# Patient Record
Sex: MALE | Race: WHITE | NOT HISPANIC OR LATINO | Employment: FULL TIME | URBAN - NONMETROPOLITAN AREA
[De-identification: names, ages, dates, MRNs, and addresses within clinical notes are randomized per-mention and may not be internally consistent; named-entity substitution may affect disease eponyms.]

---

## 2023-07-26 LAB
ALANINE AMINOTRANSFERASE (SGPT) (U/L) IN SER/PLAS: 42 U/L (ref 10–52)
ALBUMIN (G/DL) IN SER/PLAS: 4.3 G/DL (ref 3.4–5)
ALKALINE PHOSPHATASE (U/L) IN SER/PLAS: 58 U/L (ref 33–136)
ANION GAP IN SER/PLAS: 11 MMOL/L (ref 10–20)
ASPARTATE AMINOTRANSFERASE (SGOT) (U/L) IN SER/PLAS: 31 U/L (ref 9–39)
BILIRUBIN TOTAL (MG/DL) IN SER/PLAS: 1.4 MG/DL (ref 0–1.2)
CALCIUM (MG/DL) IN SER/PLAS: 9.9 MG/DL (ref 8.6–10.3)
CARBON DIOXIDE, TOTAL (MMOL/L) IN SER/PLAS: 28 MMOL/L (ref 21–32)
CHLORIDE (MMOL/L) IN SER/PLAS: 104 MMOL/L (ref 98–107)
CHOLESTEROL (MG/DL) IN SER/PLAS: 122 MG/DL (ref 0–199)
CHOLESTEROL IN HDL (MG/DL) IN SER/PLAS: 44 MG/DL
CHOLESTEROL/HDL RATIO: 2.8
CREATININE (MG/DL) IN SER/PLAS: 1.13 MG/DL (ref 0.5–1.3)
ERYTHROCYTE DISTRIBUTION WIDTH (RATIO) BY AUTOMATED COUNT: 13.8 % (ref 11.5–14.5)
ERYTHROCYTE MEAN CORPUSCULAR HEMOGLOBIN CONCENTRATION (G/DL) BY AUTOMATED: 34.5 G/DL (ref 32–36)
ERYTHROCYTE MEAN CORPUSCULAR VOLUME (FL) BY AUTOMATED COUNT: 103 FL (ref 80–100)
ERYTHROCYTES (10*6/UL) IN BLOOD BY AUTOMATED COUNT: 4.44 X10E12/L (ref 4.5–5.9)
ESTIMATED AVERAGE GLUCOSE FOR HBA1C: 137 MG/DL
GFR MALE: 70 ML/MIN/1.73M2
GLUCOSE (MG/DL) IN SER/PLAS: 175 MG/DL (ref 74–99)
HEMATOCRIT (%) IN BLOOD BY AUTOMATED COUNT: 45.5 % (ref 41–52)
HEMOGLOBIN (G/DL) IN BLOOD: 15.7 G/DL (ref 13.5–17.5)
HEMOGLOBIN A1C/HEMOGLOBIN TOTAL IN BLOOD: 6.4 %
LDL: 47 MG/DL (ref 0–99)
LEUKOCYTES (10*3/UL) IN BLOOD BY AUTOMATED COUNT: 5.9 X10E9/L (ref 4.4–11.3)
PLATELETS (10*3/UL) IN BLOOD AUTOMATED COUNT: 170 X10E9/L (ref 150–450)
POTASSIUM (MMOL/L) IN SER/PLAS: 4.4 MMOL/L (ref 3.5–5.3)
PROSTATE SPECIFIC AG (NG/ML) IN SER/PLAS: 1.17 NG/ML (ref 0–4)
PROTEIN TOTAL: 7 G/DL (ref 6.4–8.2)
SODIUM (MMOL/L) IN SER/PLAS: 139 MMOL/L (ref 136–145)
THYROTROPIN (MIU/L) IN SER/PLAS BY DETECTION LIMIT <= 0.05 MIU/L: 3.14 MIU/L (ref 0.44–3.98)
TRIGLYCERIDE (MG/DL) IN SER/PLAS: 156 MG/DL (ref 0–149)
UREA NITROGEN (MG/DL) IN SER/PLAS: 20 MG/DL (ref 6–23)
VLDL: 31 MG/DL (ref 0–40)

## 2023-07-28 DIAGNOSIS — E11.9 TYPE 2 DIABETES MELLITUS WITHOUT COMPLICATION, WITHOUT LONG-TERM CURRENT USE OF INSULIN (MULTI): ICD-10-CM

## 2023-07-28 RX ORDER — FUROSEMIDE 40 MG/1
40 TABLET ORAL DAILY
COMMUNITY
Start: 2022-07-07 | End: 2023-08-03 | Stop reason: SDUPTHER

## 2023-07-28 RX ORDER — METFORMIN HYDROCHLORIDE 500 MG/1
500 TABLET ORAL
Qty: 180 TABLET | Refills: 0 | Status: SHIPPED | OUTPATIENT
Start: 2023-07-28 | End: 2023-08-03 | Stop reason: SDUPTHER

## 2023-07-28 RX ORDER — LISINOPRIL 20 MG/1
20 TABLET ORAL DAILY
COMMUNITY
Start: 2020-10-02 | End: 2023-08-03 | Stop reason: SDUPTHER

## 2023-07-28 RX ORDER — RIVAROXABAN 20 MG/1
1 TABLET, FILM COATED ORAL DAILY
COMMUNITY
Start: 2021-06-28 | End: 2023-08-03 | Stop reason: SDUPTHER

## 2023-07-28 RX ORDER — METFORMIN HYDROCHLORIDE 500 MG/1
1 TABLET ORAL
COMMUNITY
Start: 2020-10-08 | End: 2023-07-28 | Stop reason: SDUPTHER

## 2023-07-28 RX ORDER — ATORVASTATIN CALCIUM 40 MG/1
40 TABLET, FILM COATED ORAL DAILY
COMMUNITY
Start: 2020-10-02 | End: 2023-08-03 | Stop reason: SDUPTHER

## 2023-07-31 PROBLEM — N52.9 ED (ERECTILE DYSFUNCTION) OF ORGANIC ORIGIN: Status: ACTIVE | Noted: 2023-07-31

## 2023-07-31 PROBLEM — M17.11 OSTEOARTHRITIS OF RIGHT KNEE: Status: ACTIVE | Noted: 2023-07-31

## 2023-07-31 PROBLEM — K57.30 DIVERTICULOSIS OF COLON: Status: ACTIVE | Noted: 2023-07-31

## 2023-07-31 PROBLEM — K21.9 GASTROESOPHAGEAL REFLUX DISEASE WITHOUT ESOPHAGITIS: Status: ACTIVE | Noted: 2023-07-31

## 2023-07-31 PROBLEM — R50.9 FEVER: Status: RESOLVED | Noted: 2023-07-31 | Resolved: 2023-07-31

## 2023-07-31 PROBLEM — M25.561 RIGHT KNEE PAIN: Status: ACTIVE | Noted: 2023-07-31

## 2023-07-31 PROBLEM — E78.2 MIXED HYPERLIPIDEMIA: Status: ACTIVE | Noted: 2023-07-31

## 2023-07-31 PROBLEM — D48.5 NEOPLASM OF UNCERTAIN BEHAVIOR OF SKIN: Status: ACTIVE | Noted: 2023-07-31

## 2023-07-31 PROBLEM — R58 RETROPERITONEAL BLEED: Status: ACTIVE | Noted: 2023-07-31

## 2023-07-31 PROBLEM — I10 HYPERTENSION, ESSENTIAL: Status: ACTIVE | Noted: 2023-07-31

## 2023-07-31 PROBLEM — E11.9 CONTROLLED TYPE 2 DIABETES MELLITUS WITHOUT COMPLICATION, WITHOUT LONG-TERM CURRENT USE OF INSULIN (MULTI): Status: ACTIVE | Noted: 2023-07-31

## 2023-07-31 PROBLEM — R35.1 NOCTURIA: Status: ACTIVE | Noted: 2023-07-31

## 2023-07-31 PROBLEM — R53.83 FATIGUE: Status: ACTIVE | Noted: 2023-07-31

## 2023-07-31 PROBLEM — N20.0 RECURRENT KIDNEY STONES: Status: ACTIVE | Noted: 2023-07-31

## 2023-07-31 PROBLEM — R20.0 NUMBNESS: Status: ACTIVE | Noted: 2023-07-31

## 2023-07-31 PROBLEM — I48.0 PAROXYSMAL ATRIAL FIBRILLATION (MULTI): Status: ACTIVE | Noted: 2023-07-31

## 2023-07-31 PROBLEM — R31.9 HEMATURIA: Status: ACTIVE | Noted: 2023-07-31

## 2023-07-31 RX ORDER — SILDENAFIL 50 MG/1
50 TABLET, FILM COATED ORAL AS NEEDED
COMMUNITY
Start: 2020-12-18 | End: 2023-08-03 | Stop reason: SDUPTHER

## 2023-07-31 RX ORDER — POTASSIUM CHLORIDE 20 MEQ/1
20 TABLET, EXTENDED RELEASE ORAL DAILY
COMMUNITY
Start: 2022-07-07 | End: 2023-08-03 | Stop reason: SDUPTHER

## 2023-07-31 RX ORDER — METOPROLOL TARTRATE 50 MG/1
50 TABLET ORAL EVERY 12 HOURS
COMMUNITY
Start: 2020-10-02 | End: 2023-08-03 | Stop reason: SDUPTHER

## 2023-08-03 ENCOUNTER — OFFICE VISIT (OUTPATIENT)
Dept: PRIMARY CARE | Facility: CLINIC | Age: 68
End: 2023-08-03
Payer: COMMERCIAL

## 2023-08-03 VITALS
HEIGHT: 71 IN | SYSTOLIC BLOOD PRESSURE: 128 MMHG | WEIGHT: 262.1 LBS | DIASTOLIC BLOOD PRESSURE: 78 MMHG | OXYGEN SATURATION: 96 % | HEART RATE: 64 BPM | BODY MASS INDEX: 36.69 KG/M2

## 2023-08-03 DIAGNOSIS — I10 HYPERTENSION, ESSENTIAL: ICD-10-CM

## 2023-08-03 DIAGNOSIS — I48.0 PAROXYSMAL ATRIAL FIBRILLATION (MULTI): ICD-10-CM

## 2023-08-03 DIAGNOSIS — E78.2 MIXED HYPERLIPIDEMIA: ICD-10-CM

## 2023-08-03 DIAGNOSIS — N52.9 ED (ERECTILE DYSFUNCTION) OF ORGANIC ORIGIN: ICD-10-CM

## 2023-08-03 DIAGNOSIS — E11.9 CONTROLLED TYPE 2 DIABETES MELLITUS WITHOUT COMPLICATION, WITHOUT LONG-TERM CURRENT USE OF INSULIN (MULTI): Primary | ICD-10-CM

## 2023-08-03 PROBLEM — R58 RETROPERITONEAL BLEED: Status: RESOLVED | Noted: 2023-07-31 | Resolved: 2023-08-03

## 2023-08-03 PROBLEM — R35.1 NOCTURIA: Status: RESOLVED | Noted: 2023-07-31 | Resolved: 2023-08-03

## 2023-08-03 PROBLEM — R31.9 HEMATURIA: Status: RESOLVED | Noted: 2023-07-31 | Resolved: 2023-08-03

## 2023-08-03 PROBLEM — M25.561 RIGHT KNEE PAIN: Status: RESOLVED | Noted: 2023-07-31 | Resolved: 2023-08-03

## 2023-08-03 PROBLEM — M17.11 OSTEOARTHRITIS OF RIGHT KNEE: Status: RESOLVED | Noted: 2023-07-31 | Resolved: 2023-08-03

## 2023-08-03 PROBLEM — D48.5 NEOPLASM OF UNCERTAIN BEHAVIOR OF SKIN: Status: RESOLVED | Noted: 2023-07-31 | Resolved: 2023-08-03

## 2023-08-03 PROBLEM — R20.0 NUMBNESS: Status: RESOLVED | Noted: 2023-07-31 | Resolved: 2023-08-03

## 2023-08-03 PROBLEM — R53.83 FATIGUE: Status: RESOLVED | Noted: 2023-07-31 | Resolved: 2023-08-03

## 2023-08-03 PROCEDURE — 3044F HG A1C LEVEL LT 7.0%: CPT | Performed by: FAMILY MEDICINE

## 2023-08-03 PROCEDURE — 1036F TOBACCO NON-USER: CPT | Performed by: FAMILY MEDICINE

## 2023-08-03 PROCEDURE — 1160F RVW MEDS BY RX/DR IN RCRD: CPT | Performed by: FAMILY MEDICINE

## 2023-08-03 PROCEDURE — 1157F ADVNC CARE PLAN IN RCRD: CPT | Performed by: FAMILY MEDICINE

## 2023-08-03 PROCEDURE — 1159F MED LIST DOCD IN RCRD: CPT | Performed by: FAMILY MEDICINE

## 2023-08-03 PROCEDURE — 99214 OFFICE O/P EST MOD 30 MIN: CPT | Performed by: FAMILY MEDICINE

## 2023-08-03 PROCEDURE — 3078F DIAST BP <80 MM HG: CPT | Performed by: FAMILY MEDICINE

## 2023-08-03 PROCEDURE — 3074F SYST BP LT 130 MM HG: CPT | Performed by: FAMILY MEDICINE

## 2023-08-03 PROCEDURE — 4010F ACE/ARB THERAPY RXD/TAKEN: CPT | Performed by: FAMILY MEDICINE

## 2023-08-03 RX ORDER — LISINOPRIL 20 MG/1
20 TABLET ORAL DAILY
Qty: 30 TABLET | Refills: 11 | Status: SHIPPED | OUTPATIENT
Start: 2023-08-03 | End: 2024-08-02

## 2023-08-03 RX ORDER — FUROSEMIDE 40 MG/1
40 TABLET ORAL DAILY
Qty: 30 TABLET | Refills: 11 | Status: SHIPPED | OUTPATIENT
Start: 2023-08-03 | End: 2024-08-02

## 2023-08-03 RX ORDER — METOPROLOL TARTRATE 50 MG/1
50 TABLET ORAL EVERY 12 HOURS
Qty: 60 TABLET | Refills: 11 | Status: SHIPPED | OUTPATIENT
Start: 2023-08-03 | End: 2024-08-02

## 2023-08-03 RX ORDER — METFORMIN HYDROCHLORIDE 500 MG/1
500 TABLET ORAL
Qty: 60 TABLET | Refills: 11 | Status: SHIPPED | OUTPATIENT
Start: 2023-08-03 | End: 2024-08-02

## 2023-08-03 RX ORDER — ATORVASTATIN CALCIUM 40 MG/1
40 TABLET, FILM COATED ORAL DAILY
Qty: 30 TABLET | Refills: 11 | Status: SHIPPED | OUTPATIENT
Start: 2023-08-03 | End: 2024-08-02

## 2023-08-03 RX ORDER — POTASSIUM CHLORIDE 20 MEQ/1
20 TABLET, EXTENDED RELEASE ORAL DAILY
Qty: 30 TABLET | Refills: 11 | Status: SHIPPED | OUTPATIENT
Start: 2023-08-03 | End: 2024-08-02

## 2023-08-03 RX ORDER — SILDENAFIL 50 MG/1
50 TABLET, FILM COATED ORAL AS NEEDED
Qty: 10 TABLET | Refills: 3 | Status: SHIPPED | OUTPATIENT
Start: 2023-08-03

## 2023-08-03 ASSESSMENT — ENCOUNTER SYMPTOMS
FATIGUE: 0
DIARRHEA: 0
DEPRESSION: 0
ABDOMINAL PAIN: 0
CHEST TIGHTNESS: 0
CONSTIPATION: 0
OCCASIONAL FEELINGS OF UNSTEADINESS: 0
COUGH: 0
SHORTNESS OF BREATH: 0
LOSS OF SENSATION IN FEET: 1
HEADACHES: 0
PALPITATIONS: 0

## 2023-08-03 ASSESSMENT — PATIENT HEALTH QUESTIONNAIRE - PHQ9
SUM OF ALL RESPONSES TO PHQ9 QUESTIONS 1 AND 2: 0
1. LITTLE INTEREST OR PLEASURE IN DOING THINGS: NOT AT ALL
2. FEELING DOWN, DEPRESSED OR HOPELESS: NOT AT ALL

## 2023-08-03 NOTE — PROGRESS NOTES
"Subjective   Patient ID: Sunday Barriga is a 68 y.o. male who presents for Annual Exam ([facility]).    HPI   Hypertension - Takes medication without side effects. No CP/SOB/Palpitations. Follows a no added salt diet. Counseled diet, activity and weight loss.  Diabetes Type II - Takes and tolerates medications. No hypoglycemia. Counseled on diet with low carbohydrate intake, weight loss and increased activity levels/exercise.  Hyperlipidemia - Takes and tolerates medications without fatigue and myalgias.  GERD - Takes PPI daily with no breakthrough symptoms. Reviewed dietary, caffeine, tobacco, alcohol, and NSAID avoidance.  Afib stable    Prevnar 20 today  Rec shingles hot  Rec year;ty flu and covid shots  Review of Systems   Constitutional:  Negative for fatigue.   Eyes:  Negative for visual disturbance.   Respiratory:  Negative for cough, chest tightness and shortness of breath.    Cardiovascular:  Negative for chest pain and palpitations.   Gastrointestinal:  Negative for abdominal pain, constipation and diarrhea.   Skin:  Negative for rash.   Neurological:  Negative for headaches.       Objective   /78   Pulse 64   Ht 1.803 m (5' 11\")   Wt 119 kg (262 lb 1.6 oz)   SpO2 96%   BMI 36.56 kg/m²     Physical Exam  Constitutional:       Appearance: Normal appearance.   HENT:      Head: Normocephalic and atraumatic.   Cardiovascular:      Rate and Rhythm: Normal rate and regular rhythm.      Heart sounds: Normal heart sounds.   Pulmonary:      Effort: Pulmonary effort is normal.      Breath sounds: Normal breath sounds.   Skin:     General: Skin is warm and dry.   Neurological:      General: No focal deficit present.      Mental Status: He is alert and oriented to person, place, and time.      Gait: Gait normal.   Psychiatric:         Mood and Affect: Mood normal.         Behavior: Behavior normal.         Thought Content: Thought content normal.         Judgment: Judgment normal.         Assessment/Plan "   Problem List Items Addressed This Visit       Controlled type 2 diabetes mellitus without complication, without long-term current use of insulin (CMS/[facility]) - Primary    Relevant Orders    CBC    Comprehensive Metabolic Panel    Hemoglobin A1C    Hypertension, essential    Relevant Orders    CBC    Comprehensive Metabolic Panel

## 2023-08-21 ENCOUNTER — TELEPHONE (OUTPATIENT)
Dept: PRIMARY CARE | Facility: CLINIC | Age: 68
End: 2023-08-21
Payer: COMMERCIAL

## 2023-08-21 DIAGNOSIS — Z00.00 ROUTINE GENERAL MEDICAL EXAMINATION AT A HEALTH CARE FACILITY: Primary | ICD-10-CM

## 2023-08-21 RX ORDER — SCOLOPAMINE TRANSDERMAL SYSTEM 1 MG/1
1 PATCH, EXTENDED RELEASE TRANSDERMAL
Qty: 4 PATCH | Refills: 1 | Status: SHIPPED
Start: 2023-08-21 | End: 2023-09-18 | Stop reason: ALTCHOICE

## 2023-09-18 ENCOUNTER — OFFICE VISIT (OUTPATIENT)
Dept: PRIMARY CARE | Facility: CLINIC | Age: 68
End: 2023-09-18
Payer: COMMERCIAL

## 2023-09-18 VITALS
WEIGHT: 252.5 LBS | SYSTOLIC BLOOD PRESSURE: 138 MMHG | OXYGEN SATURATION: 96 % | DIASTOLIC BLOOD PRESSURE: 62 MMHG | BODY MASS INDEX: 35.35 KG/M2 | HEART RATE: 81 BPM | HEIGHT: 71 IN

## 2023-09-18 DIAGNOSIS — R49.9 CHANGE IN VOICE: Primary | ICD-10-CM

## 2023-09-18 PROCEDURE — 3078F DIAST BP <80 MM HG: CPT | Performed by: FAMILY MEDICINE

## 2023-09-18 PROCEDURE — 1160F RVW MEDS BY RX/DR IN RCRD: CPT | Performed by: FAMILY MEDICINE

## 2023-09-18 PROCEDURE — 99213 OFFICE O/P EST LOW 20 MIN: CPT | Performed by: FAMILY MEDICINE

## 2023-09-18 PROCEDURE — 3075F SYST BP GE 130 - 139MM HG: CPT | Performed by: FAMILY MEDICINE

## 2023-09-18 PROCEDURE — 3044F HG A1C LEVEL LT 7.0%: CPT | Performed by: FAMILY MEDICINE

## 2023-09-18 PROCEDURE — 1036F TOBACCO NON-USER: CPT | Performed by: FAMILY MEDICINE

## 2023-09-18 PROCEDURE — 1157F ADVNC CARE PLAN IN RCRD: CPT | Performed by: FAMILY MEDICINE

## 2023-09-18 PROCEDURE — 4010F ACE/ARB THERAPY RXD/TAKEN: CPT | Performed by: FAMILY MEDICINE

## 2023-09-18 PROCEDURE — 1159F MED LIST DOCD IN RCRD: CPT | Performed by: FAMILY MEDICINE

## 2023-09-18 RX ORDER — PREDNISONE 20 MG/1
40 TABLET ORAL DAILY
Qty: 10 TABLET | Refills: 0 | Status: SHIPPED | OUTPATIENT
Start: 2023-09-18 | End: 2023-09-23

## 2023-09-18 ASSESSMENT — ENCOUNTER SYMPTOMS
SORE THROAT: 0
ABDOMINAL PAIN: 0
CONSTIPATION: 0
CHEST TIGHTNESS: 0
SHORTNESS OF BREATH: 0
DIZZINESS: 0
CHILLS: 0
FATIGUE: 0
NAUSEA: 0
DIFFICULTY URINATING: 0
FEVER: 0
RHINORRHEA: 0
PALPITATIONS: 0
COUGH: 0
VOICE CHANGE: 1
HEADACHES: 0
SINUS PRESSURE: 0
HEMATURIA: 0
SINUS PAIN: 0
DIARRHEA: 0

## 2023-09-18 ASSESSMENT — PATIENT HEALTH QUESTIONNAIRE - PHQ9
1. LITTLE INTEREST OR PLEASURE IN DOING THINGS: NOT AT ALL
SUM OF ALL RESPONSES TO PHQ9 QUESTIONS 1 AND 2: 0
2. FEELING DOWN, DEPRESSED OR HOPELESS: NOT AT ALL

## 2023-09-18 NOTE — PROGRESS NOTES
"Subjective   Patient ID: Sunday Barriga is a 68 y.o. male who presents for Rectal Bleeding and Hoarseness (xWK).    HPI   He might see occasional pink stuff toilet related to his bowel movements.  Colonoscopy last year was okay except for the diverticular disease and to take Xarelto.  He is not happening more often or more frequently or more severely.  No further work-up needed at this time    No obvious medications causing voice changes, no obvious allergies no obvious illness respiratory.  He does have heartburn that is well controlled if he takes the omeprazole 20 mg twice a day.    Exam is benign    Prednisone for 5 days.  If it does not help, give it another 4 to 6 weeks unless it gets worse.  If not better in 4 to 6 weeks, call and we will refer to [facility].  Review of Systems   Constitutional:  Negative for chills, fatigue and fever.   HENT:  Positive for congestion and voice change. Negative for hearing loss, postnasal drip, rhinorrhea, sinus pressure, sinus pain, sneezing and sore throat.    Respiratory:  Negative for cough, chest tightness and shortness of breath.    Cardiovascular:  Negative for chest pain and palpitations.   Gastrointestinal:  Negative for abdominal pain, constipation, diarrhea and nausea.   Genitourinary:  Negative for difficulty urinating and hematuria.   Neurological:  Negative for dizziness and headaches.       Objective   /62   Pulse 81   Ht 1.803 m (5' 11\")   Wt 115 kg (252 lb 8 oz)   SpO2 96%   BMI 35.22 kg/m²     Physical Exam  Constitutional:       Appearance: Normal appearance.   HENT:      Head: Normocephalic and atraumatic.      Mouth/Throat:      Mouth: Mucous membranes are moist. No oral lesions.      Tongue: No lesions.      Pharynx: Oropharynx is clear. Uvula midline. No posterior oropharyngeal erythema.      Tonsils: No tonsillar exudate.   Neck:      Thyroid: No thyromegaly.   Cardiovascular:      Rate and Rhythm: Normal rate and regular rhythm.      Heart " sounds: Normal heart sounds.   Pulmonary:      Effort: Pulmonary effort is normal.      Breath sounds: Normal breath sounds.   Musculoskeletal:      Cervical back: Normal range of motion and neck supple.   Lymphadenopathy:      Cervical: No cervical adenopathy.   Skin:     General: Skin is warm and dry.   Neurological:      General: No focal deficit present.      Mental Status: He is alert and oriented to person, place, and time.   Psychiatric:         Mood and Affect: Mood normal.         Behavior: Behavior normal.         Thought Content: Thought content normal.         Judgment: Judgment normal.         Assessment/Plan   Problem List Items Addressed This Visit       Change in voice - Primary    Relevant Medications    predniSONE (Deltasone) 20 mg tablet

## 2024-01-25 ENCOUNTER — LAB (OUTPATIENT)
Dept: LAB | Facility: LAB | Age: 69
End: 2024-01-25
Payer: COMMERCIAL

## 2024-01-25 DIAGNOSIS — I10 HYPERTENSION, ESSENTIAL: ICD-10-CM

## 2024-01-25 DIAGNOSIS — E11.9 CONTROLLED TYPE 2 DIABETES MELLITUS WITHOUT COMPLICATION, WITHOUT LONG-TERM CURRENT USE OF INSULIN (MULTI): ICD-10-CM

## 2024-01-25 LAB
ALBUMIN SERPL BCP-MCNC: 4 G/DL (ref 3.4–5)
ALP SERPL-CCNC: 66 U/L (ref 33–136)
ALT SERPL W P-5'-P-CCNC: 25 U/L (ref 10–52)
ANION GAP SERPL CALC-SCNC: 10 MMOL/L (ref 10–20)
AST SERPL W P-5'-P-CCNC: 19 U/L (ref 9–39)
BILIRUB SERPL-MCNC: 1.2 MG/DL (ref 0–1.2)
BUN SERPL-MCNC: 14 MG/DL (ref 6–23)
CALCIUM SERPL-MCNC: 9.6 MG/DL (ref 8.6–10.3)
CHLORIDE SERPL-SCNC: 107 MMOL/L (ref 98–107)
CO2 SERPL-SCNC: 30 MMOL/L (ref 21–32)
CREAT SERPL-MCNC: 1.04 MG/DL (ref 0.5–1.3)
EGFRCR SERPLBLD CKD-EPI 2021: 78 ML/MIN/1.73M*2
ERYTHROCYTE [DISTWIDTH] IN BLOOD BY AUTOMATED COUNT: 14.3 % (ref 11.5–14.5)
EST. AVERAGE GLUCOSE BLD GHB EST-MCNC: 143 MG/DL
GLUCOSE SERPL-MCNC: 155 MG/DL (ref 74–99)
HBA1C MFR BLD: 6.6 %
HCT VFR BLD AUTO: 42.6 % (ref 41–52)
HGB BLD-MCNC: 14.6 G/DL (ref 13.5–17.5)
MCH RBC QN AUTO: 34.1 PG (ref 26–34)
MCHC RBC AUTO-ENTMCNC: 34.3 G/DL (ref 32–36)
MCV RBC AUTO: 100 FL (ref 80–100)
NRBC BLD-RTO: 0 /100 WBCS (ref 0–0)
PLATELET # BLD AUTO: 226 X10*3/UL (ref 150–450)
POTASSIUM SERPL-SCNC: 4.5 MMOL/L (ref 3.5–5.3)
PROT SERPL-MCNC: 6.4 G/DL (ref 6.4–8.2)
RBC # BLD AUTO: 4.28 X10*6/UL (ref 4.5–5.9)
SODIUM SERPL-SCNC: 142 MMOL/L (ref 136–145)
WBC # BLD AUTO: 7.8 X10*3/UL (ref 4.4–11.3)

## 2024-01-25 PROCEDURE — 83036 HEMOGLOBIN GLYCOSYLATED A1C: CPT

## 2024-01-25 PROCEDURE — 85027 COMPLETE CBC AUTOMATED: CPT

## 2024-01-25 PROCEDURE — 36415 COLL VENOUS BLD VENIPUNCTURE: CPT

## 2024-01-25 PROCEDURE — 80053 COMPREHEN METABOLIC PANEL: CPT

## 2024-02-05 ENCOUNTER — OFFICE VISIT (OUTPATIENT)
Dept: PRIMARY CARE | Facility: CLINIC | Age: 69
End: 2024-02-05
Payer: COMMERCIAL

## 2024-02-05 VITALS
BODY MASS INDEX: 35.61 KG/M2 | HEIGHT: 71 IN | HEART RATE: 83 BPM | WEIGHT: 254.4 LBS | DIASTOLIC BLOOD PRESSURE: 64 MMHG | SYSTOLIC BLOOD PRESSURE: 120 MMHG | OXYGEN SATURATION: 97 %

## 2024-02-05 DIAGNOSIS — I48.0 PAROXYSMAL ATRIAL FIBRILLATION (MULTI): ICD-10-CM

## 2024-02-05 DIAGNOSIS — E78.2 MIXED HYPERLIPIDEMIA: ICD-10-CM

## 2024-02-05 DIAGNOSIS — E11.9 CONTROLLED TYPE 2 DIABETES MELLITUS WITHOUT COMPLICATION, WITHOUT LONG-TERM CURRENT USE OF INSULIN (MULTI): Primary | ICD-10-CM

## 2024-02-05 DIAGNOSIS — I10 HYPERTENSION, ESSENTIAL: ICD-10-CM

## 2024-02-05 DIAGNOSIS — E66.01 CLASS 2 SEVERE OBESITY DUE TO EXCESS CALORIES WITH SERIOUS COMORBIDITY AND BODY MASS INDEX (BMI) OF 35.0 TO 35.9 IN ADULT (MULTI): ICD-10-CM

## 2024-02-05 PROBLEM — E66.812 CLASS 2 SEVERE OBESITY DUE TO EXCESS CALORIES WITH SERIOUS COMORBIDITY AND BODY MASS INDEX (BMI) OF 35.0 TO 35.9 IN ADULT: Status: ACTIVE | Noted: 2024-02-05

## 2024-02-05 PROCEDURE — 99214 OFFICE O/P EST MOD 30 MIN: CPT | Performed by: FAMILY MEDICINE

## 2024-02-05 PROCEDURE — 4010F ACE/ARB THERAPY RXD/TAKEN: CPT | Performed by: FAMILY MEDICINE

## 2024-02-05 PROCEDURE — 3078F DIAST BP <80 MM HG: CPT | Performed by: FAMILY MEDICINE

## 2024-02-05 PROCEDURE — 1159F MED LIST DOCD IN RCRD: CPT | Performed by: FAMILY MEDICINE

## 2024-02-05 PROCEDURE — 3008F BODY MASS INDEX DOCD: CPT | Performed by: FAMILY MEDICINE

## 2024-02-05 PROCEDURE — 1160F RVW MEDS BY RX/DR IN RCRD: CPT | Performed by: FAMILY MEDICINE

## 2024-02-05 PROCEDURE — 1157F ADVNC CARE PLAN IN RCRD: CPT | Performed by: FAMILY MEDICINE

## 2024-02-05 PROCEDURE — 3074F SYST BP LT 130 MM HG: CPT | Performed by: FAMILY MEDICINE

## 2024-02-05 PROCEDURE — 1036F TOBACCO NON-USER: CPT | Performed by: FAMILY MEDICINE

## 2024-02-05 PROCEDURE — 3044F HG A1C LEVEL LT 7.0%: CPT | Performed by: FAMILY MEDICINE

## 2024-02-05 ASSESSMENT — ENCOUNTER SYMPTOMS
PALPITATIONS: 0
HEADACHES: 0
CONSTIPATION: 0
FATIGUE: 0
CHEST TIGHTNESS: 0
ABDOMINAL PAIN: 0
COUGH: 0
SHORTNESS OF BREATH: 0
TREMORS: 1
DIARRHEA: 0

## 2024-02-05 ASSESSMENT — PATIENT HEALTH QUESTIONNAIRE - PHQ9
2. FEELING DOWN, DEPRESSED OR HOPELESS: NOT AT ALL
1. LITTLE INTEREST OR PLEASURE IN DOING THINGS: NOT AT ALL
SUM OF ALL RESPONSES TO PHQ9 QUESTIONS 1 AND 2: 0

## 2024-02-05 NOTE — PROGRESS NOTES
"Subjective   Patient ID: Sunday Barriga is a 68 y.o. male who presents for Annual Exam ([facility]).    HPI   Talked about Farxiga and Jardiance.  He will check with his insurance company to see if they are covered and how much.  Computer no help.  Lipids stable on medication  A-fib stable sees cardiology  A1c is up a little bit, as we were talking about the Farxiga.  He is going to work on eating better and losing a little bit of weight.  He has developed a very minor intention tremor.  Will continue to watch for now.  Family history of tremor.    Review of Systems   Constitutional:  Negative for fatigue.   Eyes:  Negative for visual disturbance.   Respiratory:  Negative for cough, chest tightness and shortness of breath.    Cardiovascular:  Negative for chest pain and palpitations.   Gastrointestinal:  Negative for abdominal pain, constipation and diarrhea.   Skin:  Negative for rash.   Neurological:  Positive for tremors. Negative for headaches.       Objective   /64   Pulse 83   Ht 1.803 m (5' 11\")   Wt 115 kg (254 lb 6.4 oz)   SpO2 97%   BMI 35.48 kg/m²     Physical Exam  Constitutional:       Appearance: Normal appearance.   HENT:      Head: Normocephalic and atraumatic.   Cardiovascular:      Rate and Rhythm: Normal rate and regular rhythm.      Heart sounds: Normal heart sounds.   Pulmonary:      Effort: Pulmonary effort is normal.      Breath sounds: Normal breath sounds.   Skin:     General: Skin is warm and dry.   Neurological:      General: No focal deficit present.      Mental Status: He is alert and oriented to person, place, and time.   Psychiatric:         Mood and Affect: Mood normal.         Behavior: Behavior normal.         Thought Content: Thought content normal.         Judgment: Judgment normal.         Assessment/Plan   Problem List Items Addressed This Visit             ICD-10-CM    Controlled type 2 diabetes mellitus without complication, without long-term current use of insulin " (CMS/[facility]) - Primary E11.9    Relevant Orders    Comprehensive Metabolic Panel    Hemoglobin A1C    Hypertension, essential I10    Mixed hyperlipidemia E78.2    Paroxysmal atrial fibrillation (CMS/[facility]) I48.0    Class 2 severe obesity due to excess calories with serious comorbidity and body mass index (BMI) of 35.0 to 35.9 in adult (CMS/[facility]) E66.01, Z68.35

## 2024-07-31 ENCOUNTER — LAB (OUTPATIENT)
Dept: LAB | Facility: LAB | Age: 69
End: 2024-07-31
Payer: COMMERCIAL

## 2024-07-31 DIAGNOSIS — E11.9 CONTROLLED TYPE 2 DIABETES MELLITUS WITHOUT COMPLICATION, WITHOUT LONG-TERM CURRENT USE OF INSULIN (MULTI): ICD-10-CM

## 2024-07-31 LAB
ALBUMIN SERPL BCP-MCNC: 4.2 G/DL (ref 3.4–5)
ALP SERPL-CCNC: 50 U/L (ref 33–136)
ALT SERPL W P-5'-P-CCNC: 24 U/L (ref 10–52)
ANION GAP SERPL CALC-SCNC: 14 MMOL/L (ref 10–20)
AST SERPL W P-5'-P-CCNC: 22 U/L (ref 9–39)
BILIRUB SERPL-MCNC: 1.3 MG/DL (ref 0–1.2)
BUN SERPL-MCNC: 20 MG/DL (ref 6–23)
CALCIUM SERPL-MCNC: 9.6 MG/DL (ref 8.6–10.3)
CHLORIDE SERPL-SCNC: 109 MMOL/L (ref 98–107)
CO2 SERPL-SCNC: 24 MMOL/L (ref 21–32)
CREAT SERPL-MCNC: 1.09 MG/DL (ref 0.5–1.3)
EGFRCR SERPLBLD CKD-EPI 2021: 73 ML/MIN/1.73M*2
GLUCOSE SERPL-MCNC: 153 MG/DL (ref 74–99)
POTASSIUM SERPL-SCNC: 4.6 MMOL/L (ref 3.5–5.3)
PROT SERPL-MCNC: 6.1 G/DL (ref 6.4–8.2)
SODIUM SERPL-SCNC: 142 MMOL/L (ref 136–145)

## 2024-07-31 PROCEDURE — 80053 COMPREHEN METABOLIC PANEL: CPT

## 2024-07-31 PROCEDURE — 83036 HEMOGLOBIN GLYCOSYLATED A1C: CPT

## 2024-08-01 LAB
EST. AVERAGE GLUCOSE BLD GHB EST-MCNC: 105 MG/DL
HBA1C MFR BLD: 5.3 %

## 2024-08-05 ENCOUNTER — DOCUMENTATION (OUTPATIENT)
Dept: CARE COORDINATION | Facility: CLINIC | Age: 69
End: 2024-08-05

## 2024-08-05 ENCOUNTER — APPOINTMENT (OUTPATIENT)
Dept: PRIMARY CARE | Facility: CLINIC | Age: 69
End: 2024-08-05
Payer: COMMERCIAL

## 2024-08-05 VITALS
HEIGHT: 70 IN | DIASTOLIC BLOOD PRESSURE: 76 MMHG | BODY MASS INDEX: 37.51 KG/M2 | OXYGEN SATURATION: 97 % | HEART RATE: 72 BPM | WEIGHT: 262 LBS | SYSTOLIC BLOOD PRESSURE: 126 MMHG

## 2024-08-05 DIAGNOSIS — I48.0 PAROXYSMAL ATRIAL FIBRILLATION (MULTI): ICD-10-CM

## 2024-08-05 DIAGNOSIS — I10 HYPERTENSION, ESSENTIAL: ICD-10-CM

## 2024-08-05 DIAGNOSIS — E78.2 MIXED HYPERLIPIDEMIA: ICD-10-CM

## 2024-08-05 DIAGNOSIS — E11.9 CONTROLLED TYPE 2 DIABETES MELLITUS WITHOUT COMPLICATION, WITHOUT LONG-TERM CURRENT USE OF INSULIN (MULTI): ICD-10-CM

## 2024-08-05 PROBLEM — I25.10 CAD (CORONARY ARTERY DISEASE): Status: ACTIVE | Noted: 2024-08-05

## 2024-08-05 RX ORDER — NITROGLYCERIN 0.4 MG/1
0.4 TABLET SUBLINGUAL EVERY 5 MIN PRN
COMMUNITY
Start: 2024-01-12

## 2024-08-05 RX ORDER — FOLIC ACID/MULTIVIT,IRON,MINER 0.4MG-18MG
TABLET ORAL
COMMUNITY

## 2024-08-05 RX ORDER — ATORVASTATIN CALCIUM 40 MG/1
40 TABLET, FILM COATED ORAL DAILY
Qty: 30 TABLET | Refills: 11 | Status: SHIPPED | OUTPATIENT
Start: 2024-08-05 | End: 2025-08-05

## 2024-08-05 RX ORDER — LISINOPRIL 20 MG/1
20 TABLET ORAL DAILY
Qty: 30 TABLET | Refills: 11 | Status: SHIPPED | OUTPATIENT
Start: 2024-08-05 | End: 2025-08-05

## 2024-08-05 RX ORDER — POTASSIUM CHLORIDE 20 MEQ/1
20 TABLET, EXTENDED RELEASE ORAL DAILY
Qty: 30 TABLET | Refills: 11 | Status: SHIPPED | OUTPATIENT
Start: 2024-08-05 | End: 2025-08-05

## 2024-08-05 RX ORDER — DILTIAZEM HYDROCHLORIDE 180 MG/1
180 CAPSULE, COATED, EXTENDED RELEASE ORAL
COMMUNITY
Start: 2024-01-12

## 2024-08-05 RX ORDER — ASPIRIN 81 MG/1
81 TABLET ORAL
COMMUNITY

## 2024-08-05 RX ORDER — METFORMIN HYDROCHLORIDE 500 MG/1
500 TABLET ORAL
Qty: 60 TABLET | Refills: 11 | Status: SHIPPED | OUTPATIENT
Start: 2024-08-05 | End: 2025-08-05

## 2024-08-05 RX ORDER — METOPROLOL TARTRATE 50 MG/1
50 TABLET ORAL EVERY 12 HOURS
Qty: 60 TABLET | Refills: 11 | Status: SHIPPED | OUTPATIENT
Start: 2024-08-05 | End: 2025-08-05

## 2024-08-05 RX ORDER — FUROSEMIDE 40 MG/1
40 TABLET ORAL DAILY
Qty: 30 TABLET | Refills: 11 | Status: SHIPPED | OUTPATIENT
Start: 2024-08-05 | End: 2025-08-05

## 2024-08-05 ASSESSMENT — ENCOUNTER SYMPTOMS
LIGHT-HEADEDNESS: 0
HEADACHES: 0
NAUSEA: 0
CONSTIPATION: 0
SHORTNESS OF BREATH: 0
FATIGUE: 1
DIZZINESS: 0
COUGH: 0
PALPITATIONS: 0

## 2024-08-05 NOTE — PROGRESS NOTES
"Patient: Sunday Barriga  : 1955  PCP: Morgan Barker MD  MRN: [id number]  Program: Transitional Care Management  Status: Enrolled  Effective Dates: 2024 - present  Responsible Staff: Christi Trevino RN  Social Determinants to be Addressed: Alcohol Use, Financial Resource Strain, Physical Activity, Social Connections, Stress, Tobacco Use, Transportation Needs         Sunday Barriga is a 69 y.o. male presenting today for follow-up after being discharged from the hospital 1 days ago. The main problem requiring admission was near syncope. The discharge summary and/or Transitional Care Management documentation was reviewed. Medication reconciliation was performed as indicated via the \"Job as Reviewed\" timestamp.     Sunday Barriga was contacted by Transitional Care Management services two days after his discharge. This encounter and supporting documentation was reviewed.  Does not drink enough fluids pretty much all the time, but outside working in this hot weather basically passed out from dehydration.  Labs were generally okay including electrolytes, but he did have some anemia.  CBC was okay at the beginning of the year, I think it is delusional.  But no need to recheck.  Did have a high D-dimer did ultrasound of both legs which was negative, there was no blood clot in the lungs, but it did show an opacity in the left lower lobe.  CT scan most recently of the abdomen cut through the lower part of the chest but it did not show anything.  CT scan done in  showed bibasilar problems, and that was with COVID.  No clinical signs of pneumonia, will he need a follow-up CT scan?    CBC and CMP in 6 weeks office visit 2 months.    This was his follow-up from his for his diabetes, A1c was down to 5.3 with better diet.    Stop furosemide and potassium for now.  Only use it as needed.  Increase things like Gatorade and Powerade.  Increase water intake.  Continue the good diet changes with the A1c down to 5.3.  " "Continue metformin for now.      Review of Systems   Constitutional:  Positive for fatigue.   Eyes:  Negative for visual disturbance.   Respiratory:  Negative for cough and shortness of breath.    Cardiovascular:  Negative for chest pain and palpitations.   Gastrointestinal:  Negative for constipation and nausea.   Neurological:  Negative for dizziness, light-headedness and headaches.       /76   Pulse 72   Ht 1.765 m (5' 9.5\")   Wt 119 kg (262 lb)   SpO2 97%   BMI 38.14 kg/m²     Physical Exam  Constitutional:       Appearance: Normal appearance.   HENT:      Head: Normocephalic and atraumatic.   Cardiovascular:      Rate and Rhythm: Normal rate and regular rhythm.      Heart sounds: Normal heart sounds.   Pulmonary:      Effort: Pulmonary effort is normal.      Breath sounds: Normal breath sounds.   Skin:     General: Skin is warm and dry.   Neurological:      General: No focal deficit present.      Mental Status: He is alert and oriented to person, place, and time.   Psychiatric:         Mood and Affect: Mood normal.         Behavior: Behavior normal.         Thought Content: Thought content normal.         Judgment: Judgment normal.         The complexity of medical decision making for this patient's transitional care is moderate.    Assessment/Plan   Problem List Items Addressed This Visit             ICD-10-CM    Controlled type 2 diabetes mellitus without complication, without long-term current use of insulin (Multi) E11.9    Relevant Medications    metFORMIN (Glucophage) 500 mg tablet    Hypertension, essential I10    Relevant Medications    lisinopril 20 mg tablet    metoprolol tartrate (Lopressor) 50 mg tablet    potassium chloride CR 20 mEq ER tablet    Other Relevant Orders    CBC    Comprehensive Metabolic Panel    Mixed hyperlipidemia E78.2    Relevant Medications    atorvastatin (Lipitor) 40 mg tablet    Paroxysmal atrial fibrillation (Multi) I48.0    Relevant Medications    dilTIAZem CD " (Cardizem CD) 180 mg 24 hr capsule    nitroglycerin (Nitrostat) 0.4 mg SL tablet    furosemide (Lasix) 40 mg tablet    metoprolol tartrate (Lopressor) 50 mg tablet    rivaroxaban (Xarelto) 20 mg tablet    Other Relevant Orders    CBC    Comprehensive Metabolic Panel

## 2024-08-05 NOTE — PROGRESS NOTES
Discharge Facility:[facility]  Discharge Diagnosis:ICD-10: R55 NOT ABLE TO VIEW SWG RECORD  Admission Date:8.3.24  Discharge Date: 8.4.24    PCP Appointment Date:8.5.24  Specialist Appointment Date:   Hospital Encounter and Summary Linked: No  Appt within 2 business days

## 2024-08-06 ENCOUNTER — TELEPHONE (OUTPATIENT)
Age: 69
End: 2024-08-06
Payer: COMMERCIAL

## 2024-08-06 NOTE — TELEPHONE ENCOUNTER
"Patient's wife called, patient is feeling light headed and slightly nauseated, had an \"episode\" over the weekend, patient was advised to go to the urgent care or the ER. Scheduled for 08/07 at patient request but patient did state if he starts to feel worse that he will go to the ER.   "

## 2024-08-07 ENCOUNTER — OFFICE VISIT (OUTPATIENT)
Age: 69
End: 2024-08-07
Payer: COMMERCIAL

## 2024-08-07 VITALS
HEIGHT: 70 IN | BODY MASS INDEX: 37.37 KG/M2 | DIASTOLIC BLOOD PRESSURE: 72 MMHG | WEIGHT: 261 LBS | HEART RATE: 80 BPM | OXYGEN SATURATION: 98 % | SYSTOLIC BLOOD PRESSURE: 130 MMHG

## 2024-08-07 DIAGNOSIS — R42 DIZZINESS: Primary | ICD-10-CM

## 2024-08-07 DIAGNOSIS — I10 HYPERTENSION, ESSENTIAL: ICD-10-CM

## 2024-08-07 DIAGNOSIS — E11.9 CONTROLLED TYPE 2 DIABETES MELLITUS WITHOUT COMPLICATION, WITHOUT LONG-TERM CURRENT USE OF INSULIN (MULTI): ICD-10-CM

## 2024-08-07 ASSESSMENT — ENCOUNTER SYMPTOMS
FATIGUE: 1
SHORTNESS OF BREATH: 0
HEADACHES: 0
DIZZINESS: 1
NAUSEA: 1
PALPITATIONS: 0

## 2024-08-07 NOTE — PROGRESS NOTES
"Subjective   Patient ID: Sunday Barriga is a 69 y.o. male who presents for light headed (Comes and goes) and Nausea.    HPI   Prevnar 20 done    Woke up yesterday morning felt fine.  When he took all of his medicines, diltiazem lisinopril metformin Nexium baby aspirin, about 20 or 30 minutes later he got very dizzy nauseous.  He had not had any food by then, but not a typical reaction with metformin or medicines.  That seemed to get better as the day goes on.  This morning he had the same symptoms but they were little bit more mild  When he checked his blood pressure at home he got a low number today.  It was lower than what we got today in the office.    Continue diltiazem  Stop lisinopril  Stop metformin  Cut the metoprolol in half, half a pill twice a day.  He does have labs to complete in 6 weeks, we will still hold those for now.  If things do not get better in the next couple days he will call and we will do lab work.  And adjust the diltiazem down.  Office visit 2 weeks    Review of Systems   Constitutional:  Positive for fatigue.   Respiratory:  Negative for shortness of breath.    Cardiovascular:  Negative for chest pain and palpitations.   Gastrointestinal:  Positive for nausea.   Neurological:  Positive for dizziness. Negative for headaches.       Objective   /72   Pulse 80   Ht 1.765 m (5' 9.5\")   Wt 118 kg (261 lb)   SpO2 98%   BMI 37.99 kg/m²     Physical Exam  Constitutional:       Appearance: Normal appearance.   HENT:      Head: Normocephalic and atraumatic.   Cardiovascular:      Rate and Rhythm: Normal rate and regular rhythm.      Heart sounds: Normal heart sounds.   Pulmonary:      Effort: Pulmonary effort is normal.      Breath sounds: Normal breath sounds.   Skin:     General: Skin is warm and dry.   Neurological:      General: No focal deficit present.      Mental Status: He is alert and oriented to person, place, and time.   Psychiatric:         Mood and Affect: Mood normal.         " Behavior: Behavior normal.         Thought Content: Thought content normal.         Judgment: Judgment normal.         Assessment/Plan   Problem List Items Addressed This Visit             ICD-10-CM    Controlled type 2 diabetes mellitus without complication, without long-term current use of insulin (Multi) E11.9    Hypertension, essential I10     Other Visit Diagnoses         Codes    Dizziness    -  Primary R42

## 2024-08-07 NOTE — PATIENT INSTRUCTIONS
Continue diltiazem 180 mg once a day  Stop lisinopril 20 mg daily  Stop metformin 500 mg twice a day  Cut the metoprolol in half, half a pill twice a day.

## 2024-08-09 ENCOUNTER — PATIENT OUTREACH (OUTPATIENT)
Dept: CARE COORDINATION | Facility: CLINIC | Age: 69
End: 2024-08-09
Payer: COMMERCIAL

## 2024-08-09 NOTE — PROGRESS NOTES
Unable to reach patient for call back after patient’s follow up appointment with PCP.  LVM  with call back number for patient to call if needed.

## 2024-08-22 ENCOUNTER — APPOINTMENT (OUTPATIENT)
Age: 69
End: 2024-08-22
Payer: COMMERCIAL

## 2024-08-23 ENCOUNTER — APPOINTMENT (OUTPATIENT)
Age: 69
End: 2024-08-23
Payer: COMMERCIAL

## 2024-08-23 VITALS
SYSTOLIC BLOOD PRESSURE: 124 MMHG | HEART RATE: 88 BPM | DIASTOLIC BLOOD PRESSURE: 66 MMHG | BODY MASS INDEX: 36.68 KG/M2 | WEIGHT: 256.2 LBS | OXYGEN SATURATION: 98 % | HEIGHT: 70 IN

## 2024-08-23 DIAGNOSIS — I10 HYPERTENSION, ESSENTIAL: ICD-10-CM

## 2024-08-23 PROCEDURE — 3008F BODY MASS INDEX DOCD: CPT | Performed by: FAMILY MEDICINE

## 2024-08-23 PROCEDURE — 3074F SYST BP LT 130 MM HG: CPT | Performed by: FAMILY MEDICINE

## 2024-08-23 PROCEDURE — 3078F DIAST BP <80 MM HG: CPT | Performed by: FAMILY MEDICINE

## 2024-08-23 PROCEDURE — 1160F RVW MEDS BY RX/DR IN RCRD: CPT | Performed by: FAMILY MEDICINE

## 2024-08-23 PROCEDURE — 1159F MED LIST DOCD IN RCRD: CPT | Performed by: FAMILY MEDICINE

## 2024-08-23 PROCEDURE — 99213 OFFICE O/P EST LOW 20 MIN: CPT | Performed by: FAMILY MEDICINE

## 2024-08-23 PROCEDURE — 1124F ACP DISCUSS-NO DSCNMKR DOCD: CPT | Performed by: FAMILY MEDICINE

## 2024-08-23 PROCEDURE — 1036F TOBACCO NON-USER: CPT | Performed by: FAMILY MEDICINE

## 2024-08-23 PROCEDURE — 1157F ADVNC CARE PLAN IN RCRD: CPT | Performed by: FAMILY MEDICINE

## 2024-08-23 PROCEDURE — 3044F HG A1C LEVEL LT 7.0%: CPT | Performed by: FAMILY MEDICINE

## 2024-08-23 RX ORDER — METOPROLOL TARTRATE 50 MG/1
25 TABLET ORAL EVERY 12 HOURS
Qty: 30 TABLET | Refills: 11 | Status: SHIPPED | OUTPATIENT
Start: 2024-08-23 | End: 2025-08-23

## 2024-08-23 ASSESSMENT — ENCOUNTER SYMPTOMS
PALPITATIONS: 0
NAUSEA: 1
FATIGUE: 1
SHORTNESS OF BREATH: 0
LIGHT-HEADEDNESS: 1
COUGH: 0

## 2024-08-23 NOTE — PATIENT INSTRUCTIONS
Restart metformin twice a day  Continue to half a pill of the metoprolol twice a day.    Complete the labs in October before the office visit.

## 2024-08-23 NOTE — PROGRESS NOTES
"Subjective   Patient ID: Sunday Barriga is a 69 y.o. male who presents for Follow-up (2 wk fu , reports less dizziness ).    HPI   He is feeling better with the medication changes but he still have 1 bad day.  Did talk to cardiology because of the swelling of his legs restarted the furosemide 40 mg daily.  BMP was okay.  This point it does not appear that the metformin caused a lot of issues he will restart it twice a day.  Stop lisinopril  Continue metoprolol 50 mg half a pill twice a day  And thought about decreasing the diltiazem, will continue to 180 mg of now.    He will complete the CBC and CMP before the office visit in October.  Will see him in October.  Has not testing for cardiology echo in September.  Review of Systems   Constitutional:  Positive for fatigue.   Respiratory:  Negative for cough and shortness of breath.    Cardiovascular:  Positive for leg swelling. Negative for chest pain and palpitations.   Gastrointestinal:  Positive for nausea.   Neurological:  Positive for light-headedness.       Objective   /66   Pulse 88   Ht 1.765 m (5' 9.5\")   Wt 116 kg (256 lb 3.2 oz)   SpO2 98%   BMI 37.29 kg/m²     Physical Exam  Constitutional:       Appearance: Normal appearance.   HENT:      Head: Normocephalic and atraumatic.   Cardiovascular:      Rate and Rhythm: Normal rate and regular rhythm.      Heart sounds: Normal heart sounds.   Pulmonary:      Effort: Pulmonary effort is normal.      Breath sounds: Normal breath sounds.   Skin:     General: Skin is warm and dry.   Neurological:      General: No focal deficit present.      Mental Status: He is alert and oriented to person, place, and time.   Psychiatric:         Mood and Affect: Mood normal.         Behavior: Behavior normal.         Thought Content: Thought content normal.         Judgment: Judgment normal.         Assessment/Plan   Problem List Items Addressed This Visit             ICD-10-CM    Hypertension, essential I10    Relevant " Medications    metoprolol tartrate (Lopressor) 50 mg tablet

## 2024-09-11 ENCOUNTER — PATIENT OUTREACH (OUTPATIENT)
Age: 69
End: 2024-09-11
Payer: COMMERCIAL

## 2024-10-02 ENCOUNTER — LAB (OUTPATIENT)
Dept: LAB | Facility: LAB | Age: 69
End: 2024-10-02
Payer: COMMERCIAL

## 2024-10-02 DIAGNOSIS — I10 HYPERTENSION, ESSENTIAL: ICD-10-CM

## 2024-10-02 DIAGNOSIS — I48.0 PAROXYSMAL ATRIAL FIBRILLATION (MULTI): ICD-10-CM

## 2024-10-02 LAB
ALBUMIN SERPL BCP-MCNC: 3.9 G/DL (ref 3.4–5)
ALP SERPL-CCNC: 71 U/L (ref 33–136)
ALT SERPL W P-5'-P-CCNC: 19 U/L (ref 10–52)
ANION GAP SERPL CALC-SCNC: 12 MMOL/L (ref 10–20)
AST SERPL W P-5'-P-CCNC: 17 U/L (ref 9–39)
BILIRUB SERPL-MCNC: 1 MG/DL (ref 0–1.2)
BUN SERPL-MCNC: 16 MG/DL (ref 6–23)
CALCIUM SERPL-MCNC: 9.3 MG/DL (ref 8.6–10.3)
CHLORIDE SERPL-SCNC: 106 MMOL/L (ref 98–107)
CO2 SERPL-SCNC: 26 MMOL/L (ref 21–32)
CREAT SERPL-MCNC: 1.02 MG/DL (ref 0.5–1.3)
EGFRCR SERPLBLD CKD-EPI 2021: 79 ML/MIN/1.73M*2
ERYTHROCYTE [DISTWIDTH] IN BLOOD BY AUTOMATED COUNT: 17 % (ref 11.5–14.5)
GLUCOSE SERPL-MCNC: 214 MG/DL (ref 74–99)
HCT VFR BLD AUTO: 35.3 % (ref 41–52)
HGB BLD-MCNC: 10.5 G/DL (ref 13.5–17.5)
MCH RBC QN AUTO: 25.6 PG (ref 26–34)
MCHC RBC AUTO-ENTMCNC: 29.7 G/DL (ref 32–36)
MCV RBC AUTO: 86 FL (ref 80–100)
NRBC BLD-RTO: 0 /100 WBCS (ref 0–0)
PLATELET # BLD AUTO: 215 X10*3/UL (ref 150–450)
POTASSIUM SERPL-SCNC: 3.8 MMOL/L (ref 3.5–5.3)
PROT SERPL-MCNC: 5.9 G/DL (ref 6.4–8.2)
RBC # BLD AUTO: 4.1 X10*6/UL (ref 4.5–5.9)
SODIUM SERPL-SCNC: 140 MMOL/L (ref 136–145)
WBC # BLD AUTO: 8.9 X10*3/UL (ref 4.4–11.3)

## 2024-10-02 PROCEDURE — 80053 COMPREHEN METABOLIC PANEL: CPT

## 2024-10-02 PROCEDURE — 85027 COMPLETE CBC AUTOMATED: CPT

## 2024-10-02 PROCEDURE — 36415 COLL VENOUS BLD VENIPUNCTURE: CPT

## 2024-10-07 ENCOUNTER — APPOINTMENT (OUTPATIENT)
Age: 69
End: 2024-10-07
Payer: COMMERCIAL

## 2024-10-07 VITALS
HEART RATE: 77 BPM | DIASTOLIC BLOOD PRESSURE: 80 MMHG | SYSTOLIC BLOOD PRESSURE: 138 MMHG | HEIGHT: 70 IN | BODY MASS INDEX: 36.99 KG/M2 | WEIGHT: 258.4 LBS | OXYGEN SATURATION: 96 %

## 2024-10-07 DIAGNOSIS — N52.9 ED (ERECTILE DYSFUNCTION) OF ORGANIC ORIGIN: ICD-10-CM

## 2024-10-07 DIAGNOSIS — D50.0 IRON DEFICIENCY ANEMIA DUE TO CHRONIC BLOOD LOSS: ICD-10-CM

## 2024-10-07 DIAGNOSIS — E11.9 CONTROLLED TYPE 2 DIABETES MELLITUS WITHOUT COMPLICATION, WITHOUT LONG-TERM CURRENT USE OF INSULIN (MULTI): Primary | ICD-10-CM

## 2024-10-07 DIAGNOSIS — I10 HYPERTENSION, ESSENTIAL: ICD-10-CM

## 2024-10-07 PROCEDURE — 1160F RVW MEDS BY RX/DR IN RCRD: CPT | Performed by: FAMILY MEDICINE

## 2024-10-07 PROCEDURE — 1157F ADVNC CARE PLAN IN RCRD: CPT | Performed by: FAMILY MEDICINE

## 2024-10-07 PROCEDURE — 1159F MED LIST DOCD IN RCRD: CPT | Performed by: FAMILY MEDICINE

## 2024-10-07 PROCEDURE — 99214 OFFICE O/P EST MOD 30 MIN: CPT | Performed by: FAMILY MEDICINE

## 2024-10-07 PROCEDURE — 3008F BODY MASS INDEX DOCD: CPT | Performed by: FAMILY MEDICINE

## 2024-10-07 PROCEDURE — 3079F DIAST BP 80-89 MM HG: CPT | Performed by: FAMILY MEDICINE

## 2024-10-07 PROCEDURE — 3044F HG A1C LEVEL LT 7.0%: CPT | Performed by: FAMILY MEDICINE

## 2024-10-07 PROCEDURE — 1036F TOBACCO NON-USER: CPT | Performed by: FAMILY MEDICINE

## 2024-10-07 PROCEDURE — 3075F SYST BP GE 130 - 139MM HG: CPT | Performed by: FAMILY MEDICINE

## 2024-10-07 RX ORDER — SILDENAFIL 50 MG/1
50 TABLET, FILM COATED ORAL AS NEEDED
Qty: 10 TABLET | Refills: 11 | Status: SHIPPED | OUTPATIENT
Start: 2024-10-07

## 2024-10-07 ASSESSMENT — ENCOUNTER SYMPTOMS
FATIGUE: 1
SHORTNESS OF BREATH: 1
BLOOD IN STOOL: 0
COUGH: 0
DIARRHEA: 0
HEMATURIA: 0
NAUSEA: 0
ABDOMINAL PAIN: 0
PALPITATIONS: 0

## 2024-10-07 ASSESSMENT — PATIENT HEALTH QUESTIONNAIRE - PHQ9
SUM OF ALL RESPONSES TO PHQ9 QUESTIONS 1 & 2: 0
1. LITTLE INTEREST OR PLEASURE IN DOING THINGS: NOT AT ALL
2. FEELING DOWN, DEPRESSED OR HOPELESS: NOT AT ALL

## 2024-10-07 NOTE — PROGRESS NOTES
"Subjective   Patient ID: Sunday Barriga is a 69 y.o. male who presents for Follow-up (2 month medication check; HTN, DM; Review labs).    HPI   Just had a stress test that was abnormal showed a severe defect but only 5% ischemic burden.  Possible low EF.  He has not heard from cardiology yet.  If there is a large area do wonder if he is going to need a cath.  Dizzy again when he restarted the metformin.  Stopped it.  Again about restarting it because of blood sugars up.  I recommend he hold it at this time.  Should if he is going to need a cath.  After about pioglitazone and Jardiance.  Computer is no help.  No diabetic medicine for now.  A1c in 2 months  Blood pressure stable on medications  Fill ED medicine    Does have anemia.  January CBC was 14.6.  First check in the hospital was 10.6 most recent check is 10.5.  Anoscopy 2022 diverticular disease.  No melena or hematochezia.  B12 level was 711.  Check ferritin and iron level in 2 months.    Just unknown is what is going to happen with his heart.  Lab and office visit 2 months    Review of Systems   Constitutional:  Positive for fatigue.   Respiratory:  Positive for shortness of breath. Negative for cough.    Cardiovascular:  Positive for leg swelling. Negative for chest pain and palpitations.   Gastrointestinal:  Negative for abdominal pain, blood in stool, diarrhea and nausea.   Genitourinary:  Negative for hematuria.       Objective   /80   Pulse 77   Ht 1.765 m (5' 9.5\")   Wt 117 kg (258 lb 6.4 oz)   SpO2 96%   BMI 37.61 kg/m²     Physical Exam  Constitutional:       Appearance: Normal appearance.   HENT:      Head: Normocephalic and atraumatic.   Cardiovascular:      Rate and Rhythm: Normal rate and regular rhythm.      Heart sounds: Normal heart sounds.   Pulmonary:      Effort: Pulmonary effort is normal.      Breath sounds: Normal breath sounds.   Skin:     General: Skin is warm and dry.   Neurological:      General: No focal deficit present.      " Mental Status: He is alert and oriented to person, place, and time.   Psychiatric:         Mood and Affect: Mood normal.         Behavior: Behavior normal.         Thought Content: Thought content normal.         Judgment: Judgment normal.         Assessment/Plan   Problem List Items Addressed This Visit             ICD-10-CM    Controlled type 2 diabetes mellitus without complication, without long-term current use of insulin (Multi) - Primary E11.9    Relevant Orders    Basic Metabolic Panel    Hemoglobin A1C    ED (erectile dysfunction) of organic origin N52.9    Relevant Medications    sildenafil (Viagra) 50 mg tablet    Hypertension, essential I10    Iron deficiency anemia due to chronic blood loss D50.0    Relevant Orders    CBC    Ferritin    Iron and TIBC

## 2024-10-09 ENCOUNTER — TELEPHONE (OUTPATIENT)
Age: 69
End: 2024-10-09
Payer: COMMERCIAL

## 2024-10-09 DIAGNOSIS — D50.0 IRON DEFICIENCY ANEMIA DUE TO CHRONIC BLOOD LOSS: Primary | ICD-10-CM

## 2024-10-09 NOTE — TELEPHONE ENCOUNTER
PATIENT NOTIFIED DR. RATLIFF WOULD LIKE PATIENT TO GET IRON AND TIBC, AND FERRITIN LEVELS DONE. THIS WILL DETERMINE IF HE NEEDS TO SEE GASTROENTEROLOGY FOR SCOPING, OR IF HE IS STABLE ENOUGH FOR HEART CATHETERIZATION. PATIENT VERBALIZED UNDERSTANDING. PER PATIENT REQUEST APPOINTMENT SCHEDULED 10/14/2024 TO DISCUSS PLAN OF CARE.

## 2024-10-10 ENCOUNTER — LAB (OUTPATIENT)
Dept: LAB | Facility: LAB | Age: 69
End: 2024-10-10
Payer: COMMERCIAL

## 2024-10-10 DIAGNOSIS — D50.0 IRON DEFICIENCY ANEMIA DUE TO CHRONIC BLOOD LOSS: ICD-10-CM

## 2024-10-10 LAB
FERRITIN SERPL-MCNC: 27 NG/ML (ref 20–300)
IRON SATN MFR SERPL: 5 % (ref 25–45)
IRON SERPL-MCNC: 18 UG/DL (ref 35–150)
TIBC SERPL-MCNC: 392 UG/DL (ref 240–445)
UIBC SERPL-MCNC: 374 UG/DL (ref 110–370)

## 2024-10-10 PROCEDURE — 83550 IRON BINDING TEST: CPT

## 2024-10-10 PROCEDURE — 83540 ASSAY OF IRON: CPT

## 2024-10-10 PROCEDURE — 82728 ASSAY OF FERRITIN: CPT

## 2024-10-10 PROCEDURE — 36415 COLL VENOUS BLD VENIPUNCTURE: CPT

## 2024-10-10 NOTE — PROGRESS NOTES
Your level is a little bit low.  Telephone conversation with the patient today to start over-the-counter iron 25 mg daily.  Going to keep the December office visit.  Dr. Wadsworth wants him to be seen next week for the increased Lasix use.

## 2024-10-14 ENCOUNTER — APPOINTMENT (OUTPATIENT)
Age: 69
End: 2024-10-14
Payer: COMMERCIAL

## 2024-10-17 ENCOUNTER — APPOINTMENT (OUTPATIENT)
Age: 69
End: 2024-10-17
Payer: COMMERCIAL

## 2024-11-01 ENCOUNTER — PATIENT OUTREACH (OUTPATIENT)
Age: 69
End: 2024-11-01
Payer: COMMERCIAL

## 2024-11-26 ENCOUNTER — LAB (OUTPATIENT)
Dept: LAB | Facility: LAB | Age: 69
End: 2024-11-26
Payer: COMMERCIAL

## 2024-11-26 DIAGNOSIS — D50.0 IRON DEFICIENCY ANEMIA DUE TO CHRONIC BLOOD LOSS: ICD-10-CM

## 2024-11-26 DIAGNOSIS — E11.9 CONTROLLED TYPE 2 DIABETES MELLITUS WITHOUT COMPLICATION, WITHOUT LONG-TERM CURRENT USE OF INSULIN (MULTI): ICD-10-CM

## 2024-11-26 LAB
ANION GAP SERPL CALC-SCNC: 9 MMOL/L (ref 10–20)
BUN SERPL-MCNC: 15 MG/DL (ref 6–23)
CALCIUM SERPL-MCNC: 9.6 MG/DL (ref 8.6–10.3)
CHLORIDE SERPL-SCNC: 106 MMOL/L (ref 98–107)
CO2 SERPL-SCNC: 29 MMOL/L (ref 21–32)
CREAT SERPL-MCNC: 1.27 MG/DL (ref 0.5–1.3)
EGFRCR SERPLBLD CKD-EPI 2021: 61 ML/MIN/1.73M*2
ERYTHROCYTE [DISTWIDTH] IN BLOOD BY AUTOMATED COUNT: 21.5 % (ref 11.5–14.5)
EST. AVERAGE GLUCOSE BLD GHB EST-MCNC: 166 MG/DL
GLUCOSE SERPL-MCNC: 218 MG/DL (ref 74–99)
HBA1C MFR BLD: 7.4 %
HCT VFR BLD AUTO: 42.7 % (ref 41–52)
HGB BLD-MCNC: 13.5 G/DL (ref 13.5–17.5)
MCH RBC QN AUTO: 28.2 PG (ref 26–34)
MCHC RBC AUTO-ENTMCNC: 31.6 G/DL (ref 32–36)
MCV RBC AUTO: 89 FL (ref 80–100)
NRBC BLD-RTO: 0 /100 WBCS (ref 0–0)
PLATELET # BLD AUTO: 151 X10*3/UL (ref 150–450)
POTASSIUM SERPL-SCNC: 4.2 MMOL/L (ref 3.5–5.3)
RBC # BLD AUTO: 4.78 X10*6/UL (ref 4.5–5.9)
SODIUM SERPL-SCNC: 140 MMOL/L (ref 136–145)
WBC # BLD AUTO: 5.2 X10*3/UL (ref 4.4–11.3)

## 2024-11-26 PROCEDURE — 85027 COMPLETE CBC AUTOMATED: CPT

## 2024-11-26 PROCEDURE — 83036 HEMOGLOBIN GLYCOSYLATED A1C: CPT

## 2024-11-26 PROCEDURE — 80048 BASIC METABOLIC PNL TOTAL CA: CPT

## 2024-11-26 PROCEDURE — 36415 COLL VENOUS BLD VENIPUNCTURE: CPT

## 2024-12-02 ENCOUNTER — APPOINTMENT (OUTPATIENT)
Age: 69
End: 2024-12-02
Payer: COMMERCIAL

## 2024-12-02 VITALS
WEIGHT: 260.4 LBS | OXYGEN SATURATION: 95 % | HEIGHT: 70 IN | HEART RATE: 84 BPM | BODY MASS INDEX: 37.28 KG/M2 | SYSTOLIC BLOOD PRESSURE: 128 MMHG | DIASTOLIC BLOOD PRESSURE: 74 MMHG

## 2024-12-02 DIAGNOSIS — I10 HYPERTENSION, ESSENTIAL: ICD-10-CM

## 2024-12-02 DIAGNOSIS — E11.9 CONTROLLED TYPE 2 DIABETES MELLITUS WITHOUT COMPLICATION, WITHOUT LONG-TERM CURRENT USE OF INSULIN (MULTI): Primary | ICD-10-CM

## 2024-12-02 DIAGNOSIS — D50.0 IRON DEFICIENCY ANEMIA DUE TO CHRONIC BLOOD LOSS: ICD-10-CM

## 2024-12-02 PROCEDURE — 3051F HG A1C>EQUAL 7.0%<8.0%: CPT | Performed by: FAMILY MEDICINE

## 2024-12-02 PROCEDURE — 3078F DIAST BP <80 MM HG: CPT | Performed by: FAMILY MEDICINE

## 2024-12-02 PROCEDURE — 3008F BODY MASS INDEX DOCD: CPT | Performed by: FAMILY MEDICINE

## 2024-12-02 PROCEDURE — 3074F SYST BP LT 130 MM HG: CPT | Performed by: FAMILY MEDICINE

## 2024-12-02 PROCEDURE — 1157F ADVNC CARE PLAN IN RCRD: CPT | Performed by: FAMILY MEDICINE

## 2024-12-02 PROCEDURE — 1036F TOBACCO NON-USER: CPT | Performed by: FAMILY MEDICINE

## 2024-12-02 PROCEDURE — 1159F MED LIST DOCD IN RCRD: CPT | Performed by: FAMILY MEDICINE

## 2024-12-02 PROCEDURE — 99214 OFFICE O/P EST MOD 30 MIN: CPT | Performed by: FAMILY MEDICINE

## 2024-12-02 PROCEDURE — 1124F ACP DISCUSS-NO DSCNMKR DOCD: CPT | Performed by: FAMILY MEDICINE

## 2024-12-02 PROCEDURE — 1160F RVW MEDS BY RX/DR IN RCRD: CPT | Performed by: FAMILY MEDICINE

## 2024-12-02 RX ORDER — FERROUS SULFATE 325(65) MG
325 TABLET, DELAYED RELEASE (ENTERIC COATED) ORAL
COMMUNITY

## 2024-12-02 RX ORDER — METFORMIN HYDROCHLORIDE 500 MG/1
500 TABLET ORAL
Start: 2024-12-02 | End: 2025-12-02

## 2024-12-02 ASSESSMENT — ENCOUNTER SYMPTOMS
FATIGUE: 0
PALPITATIONS: 0
NAUSEA: 0
SHORTNESS OF BREATH: 1

## 2024-12-02 NOTE — PROGRESS NOTES
"Subjective   Patient ID: Sunday Barriga is a 69 y.o. male who presents for Follow-up (2 months follow up stress test, anemia, DM, HTN, HL. Pedal edema, SOB).    HPI   Blood pressure stable on medication.  Continue same doses.  A1c is up to 7.4 but he is taking the metformin 500 mg short acting once a day.  Not getting any dizziness.  Increase metformin to twice a day  Start Jardiance 10 mg daily.  EF was approximately mid 40s on the PET CT scan.    Is changing cardiology to somebody in [place] will see them in the next couple weeks.    The anemia has corrected itself with the daily iron.  Hemoglobin is up to 13.5.  Continue daily iron for now.  Discussed that he was likely just iron deficient and will not need to use iron lifelong.  But he also does take the blood thinner and we will have to watch over time.    Office visit only 1 month.  He how he is doing with the higher dose of the metformin on the new Jardiance.    Review of Systems   Constitutional:  Negative for fatigue.   Respiratory:  Positive for shortness of breath.    Cardiovascular:  Negative for chest pain and palpitations.   Gastrointestinal:  Negative for nausea.   Skin:  Negative for rash.       Objective   /74   Pulse 84   Ht 1.765 m (5' 9.5\")   Wt 118 kg (260 lb 6.4 oz)   SpO2 95%   BMI 37.90 kg/m²     Physical Exam  Constitutional:       Appearance: Normal appearance.   Skin:     General: Skin is warm and dry.   Neurological:      General: No focal deficit present.      Mental Status: He is alert and oriented to person, place, and time.   Psychiatric:         Mood and Affect: Mood normal.         Behavior: Behavior normal.         Judgment: Judgment normal.         Assessment/Plan   Problem List Items Addressed This Visit             ICD-10-CM    Controlled type 2 diabetes mellitus without complication, without long-term current use of insulin (Multi) - Primary E11.9    Relevant Medications    metFORMIN (Glucophage) 500 mg tablet    " empagliflozin (Jardiance) 10 mg    Hypertension, essential I10    Iron deficiency anemia due to chronic blood loss D50.0

## 2024-12-13 ENCOUNTER — HOSPITAL ENCOUNTER (OUTPATIENT)
Dept: HOSPITAL 100 - LAB | Age: 69
Discharge: HOME | End: 2024-12-13
Payer: COMMERCIAL

## 2024-12-13 DIAGNOSIS — I48.91: ICD-10-CM

## 2024-12-13 DIAGNOSIS — R06.02: ICD-10-CM

## 2024-12-13 DIAGNOSIS — I10: ICD-10-CM

## 2024-12-13 DIAGNOSIS — E78.5: ICD-10-CM

## 2024-12-13 DIAGNOSIS — E11.9: ICD-10-CM

## 2024-12-13 DIAGNOSIS — I25.10: Primary | ICD-10-CM

## 2024-12-13 LAB
ALANINE AMINOTRANSFER ALT/SGPT: 42 U/L (ref 16–61)
ALBUMIN SERPL-MCNC: 3.7 G/DL (ref 3.2–5)
ALKALINE PHOSPHATASE: 99 U/L (ref 45–117)
ANION GAP: 4 (ref 5–15)
AST(SGOT): 32 U/L (ref 15–37)
BILIRUB DIRECT SERPL-MCNC: 0.3 MG/DL (ref 0–0.3)
BUN SERPL-MCNC: 17 MG/DL (ref 7–18)
BUN/CREAT RATIO: 13.1 RATIO (ref 10–20)
CALCIUM SERPL-MCNC: 9.8 MG/DL (ref 8.5–10.1)
CARBON DIOXIDE: 31 MMOL/L (ref 21–32)
CHLORIDE: 105 MMOL/L (ref 98–107)
CHOLEST SERPL-MCNC: 137 MG/DL
DEPRECATED RDW RBC: 63.8 FL (ref 35.1–43.9)
ERYTHROCYTE [DISTWIDTH] IN BLOOD: 19.8 % (ref 11.6–14.6)
EST GLOM FILT RATE - AFR AMER: 70 ML/MIN (ref 60–?)
GLOBULIN: 3.6 G/DL (ref 2.2–4.2)
GLUCOSE: 147 MG/DL (ref 74–106)
HCT VFR BLD AUTO: 44.1 % (ref 40–54)
HEMOGLOBIN: 14.9 G/DL (ref 13–16.5)
HGB BLD-MCNC: 14.9 G/DL (ref 13–16.5)
IMMATURE GRANULOCYTES COUNT: 0.03 X10^3/UL (ref 0–0)
MCV RBC: 89.1 FL (ref 80–94)
MEAN CORP HGB CONC: 33.8 G/DL (ref 32–36)
MEAN PLATELET VOL.: 9.8 FL (ref 6.2–12)
NRBC FLAGGED BY ANALYZER: 0 % (ref 0–5)
PLATELET # BLD: 185 K/MM3 (ref 150–450)
PLATELET COUNT: 185 K/MM3 (ref 150–450)
POTASSIUM: 4.4 MMOL/L (ref 3.5–5.1)
RBC # BLD AUTO: 4.95 M/MM3 (ref 4.6–6.2)
RBC DISTRIBUTION WIDTH CV: 19.8 % (ref 11.6–14.6)
RBC DISTRIBUTION WIDTH SD: 63.8 FL (ref 35.1–43.9)
TRIGLYCERIDES: 187 MG/DL
VLDLC SERPL-MCNC: 37 MG/DL (ref 5–40)
WBC # BLD AUTO: 6.5 K/MM3 (ref 4.4–11)
WHITE BLOOD COUNT: 6.5 K/MM3 (ref 4.4–11)

## 2024-12-13 PROCEDURE — 80061 LIPID PANEL: CPT

## 2024-12-13 PROCEDURE — 82570 ASSAY OF URINE CREATININE: CPT

## 2024-12-13 PROCEDURE — 83880 ASSAY OF NATRIURETIC PEPTIDE: CPT

## 2024-12-13 PROCEDURE — 80076 HEPATIC FUNCTION PANEL: CPT

## 2024-12-13 PROCEDURE — 36415 COLL VENOUS BLD VENIPUNCTURE: CPT

## 2024-12-13 PROCEDURE — 85025 COMPLETE CBC W/AUTO DIFF WBC: CPT

## 2024-12-13 PROCEDURE — 80048 BASIC METABOLIC PNL TOTAL CA: CPT

## 2024-12-13 PROCEDURE — 82043 UR ALBUMIN QUANTITATIVE: CPT

## 2024-12-13 PROCEDURE — 83036 HEMOGLOBIN GLYCOSYLATED A1C: CPT

## 2024-12-14 NOTE — XMS RPT_ITS
Comprehensive CCD (C-CDA v2.1)  
  
                          Created on: 2024  
  
  
Franc Cloud  
External Reference #: CDR,PersonID:[id number]  
: [id number]  
Sex: Male  
  
Demographics  
  
  
                                        Address             [address] [place]  [address]  
   
                                        Mobile Phone        [phone]  
   
                                        Home Phone          [phone]  
   
                                        Work Phone          [phone]  
   
                                        Preferred Language  en  
   
                                        Marital Status        
   
                                        Anabaptism Affiliation Unknown  
   
                                        Race                White  
   
                                        Ethnic Group        Not  or Lati  
no  
  
  
Author  
  
  
                                        Organization        [facility]  
  
  
Care Team Providers  
  
  
                                Care Team Member Name Role            Phone  
   
                                Jason Bonilla Unavailable     Unavai  
lable  
   
                                WoodSoledad L Attending       Unavailable  
   
                                Furness, Jason T Primary Care    Unavailable  
   
                                Wood, Soledad L Admitting       Unavailable  
   
                                Wood, Soledad L Attending       Unavailable  
   
                                Furness, Jason T Primary Care    Unavailable  
   
                                Wood, Soledad L Attending       Unavailable  
   
                                Furness, Jason T Primary Care    Unavailable  
   
                                Wood, Soledad L Admitting       Unavailable  
   
                                MaloneIsael    Attending       Unavailable  
   
                                Furness, Jason MCCULLOUGH Primary Care    Unavailable  
   
                                MaloneIsael avendaño    Attending       Unavailable  
   
                                Furness, Jason MCCULLOUGH Primary Care    Unavailable  
   
                                FurnessJason Unavailable     Unavailable  
   
                                Furness, Jason MCCULLOUGH Unavailable     Unavailable  
   
                                Edmund Oconnell Unavailable     Unavailable  
   
                                FurnessJason Primary Care Provider   
[phone]  
   
                                [facility] UROLOGY PROCEDURE RM, [id number] Unava  
ilable     Unavailable  
   
                                Malone II, Isael   Unavailable     Unavailable  
   
                                Furness, Jason MCCULLOUGH Unavailable     [phone]  
   
                                Unavailable     Unavailable     [phone]  
   
                                Jason Bonilla MD Primary Care Provid  
er [phone]  
   
                                Jason Bonilla MD Primary Care Provid  
er [phone]  
   
                                Unavailable     Unavailable     [phone]  
   
                                Jason Bonilla MD Primary Care Provid  
er [phone]  
   
                                JASON BONILLA Primary Care    Unavailable  
   
                                FURNESSJASON Referring       Unavailable  
   
                                FURNESSJASON Attending       Unavailable  
   
                                FURNESS, JASON MCCULLOUGH Referring       Unavailable  
   
                                FURNESS, JASON MCCULLOUGH Attending       Unavailable  
   
                                FURNESS, JASON MCCULLOUGH Primary Care    Unavailable  
   
                                Joel Adame  Attending       Unavailable  
   
                                Furness, Dr. Jason Haskins Primary Care    Un  
available  
   
                                Furness, Dr. Jason Haskins Referring       Un  
available  
   
                                Joel Adame  Admitting       Unavailable  
   
                                Furness Jason ACLZADA Primary Care Provider [phone]  
   
                                Jason Bonilla MD Unavailable     [phone]  
   
                                SYSTEM, PROVIDER NOT IN Referring       Unavaila  
ble  
   
                                SYSTEM, PROVIDER NOT IN Attending       Unavaila  
ble  
   
                                FURNESS, [facility] Primary Care    Unavai  
lable  
   
                                FURNESS, [facility] Primary Care    Unavai  
lable  
   
                                JAIME DONNA MCGEE Admitting       Unavailable  
   
                                JAIME DO, DONNA Attending       Unavailable  
   
                                FURNESS, JASON  Primary Care    Unavailable  
   
                                VIAU, EDMUND RAMÍREZ Attending       Unavailable  
   
                                FURNESS, [facility] Primary Care    Unavai  
lable  
   
                                VIAU, EDMUND RAMÍREZ Attending       Unavailable  
   
                                FURNESS, [facility] Primary Care    Unavai  
lable  
   
                                FURNESS, [facility] Primary Care    Unavai  
lable  
   
                                CAROLEE JAUREGUI Attending       Unavailable  
   
                                HAYLEE CORTEZ Referring       Unavailable  
   
                                HAYLEE CORTEZ Admitting       Unavailable  
   
                                FURNESS, [facility]    Unavai  
labCARLTON Mcdaniel JR. Attending       Unavailable  
   
                                FURNESS, [facility]    Unavai  
HAYLEE Stringer Attending       Unavailable  
   
                                HAYLEE CORTEZ Admitting       Unavailable  
   
                                Furness Jason CALZADA Primary Care Provider [phone]  
   
                                Tong CHAKRABORTY, Vanessa Unavailable     Unavailable  
   
                                HAYLEE CORTEZ Attending       Unavailable  
   
                                FURNESS, [facility]    Unavai  
HAYLEE Stringer Referring       Unavailable  
   
                                HAYLEE CORTEZ Attending       Unavailable  
   
                                FURNESS, [facility] Primary Care    UnaHAYLEE Murray Referring       Unavailable  
   
                                HAYLEE CORTEZ Referring       Unavailable  
   
                                FURNESS, [facility]    Unavai  
labHAYLEE Ghosh Referring       Unavailable  
   
                                FURNESS, [facility]    Unavai  
labHAYLEE Ghosh Attending       Unavailable  
   
                                HAYLEE CORTEZ Referring       Unavailable  
   
                                HAYLEE CORTEZ Attending       Unavailable  
   
                                FURNESS, [facility] Primary Care    Unavai  
HAYLEE Stringer Referring       Unavailable  
   
                                HAYLEE CORTEZ Attending       Unavailable  
   
                                FURNESS, [facility] Primary Care    Unavai  
lable  
   
                                FURNESS, JASON  Primary Care    Unavailable  
   
                                FURNESS, JASON MCCULLOUGH Primary Care    Unavailable  
   
                                FURNESS, JASON MCCULLOUGH Primary Care    Unavailable  
   
                                FURNESS, JASON T Primary Care    Unavailable  
   
                                FURNESS, JASON T Primary Care    Unavailable  
   
                                Furness Jason CALZADA Primary Care Provider [phone]  
[phone]  
   
                                FURNESS, JASON T Attending       Unavailable  
   
                                FURNESS, JASON T Referring       Unavailable  
   
                                FURNESS, JASON T Primary Care    Unavailable  
   
                                FURNESS, JASON T Attending       Unavailable  
   
                                FURNESS, JASON T Referring       Unavailable  
   
                                FURNESS, JASON T Primary Care    Unavailable  
   
                                FURNESS, JASON T Attending       Unavailable  
   
                                FURNESS, JASON T Primary Care    Unavailable  
   
                                FURNESS, JASON T Attending       Unavailable  
   
                                FURNESS, JASON T Referring       Unavailable  
   
                                FURNESS, JASON T Primary Care    Unavailable  
   
                                FURNESS, JASON T Attending       Unavailable  
   
                                FURNESS, JASON T Referring       Unavailable  
   
                                FURNESS, JASON T Primary Care    Unavailable  
   
                                FURNESS, JASON T Attending       Unavailable  
   
                                FURNESS, JASON T Referring       Unavailable  
   
                                FURNESS, JASON T Primary Care    Unavailable  
  
  
  
Allergies  
  
  
                                                    Allergy   
Classification                          Reported   
Allergen(s)               Allergy Type              Date of   
Onset                     Reaction(s)               Facility  
   
                                                      
(1 source)                              Lidocaine;   
Translations:   
[lidocaine]     Drug Allergy                                    [facility]   
Repository  
  
  
  
Medications  
Current Medications  
  
  
  
                      Medication Drug Class(es) Dates      Sig (Normalized) Sig   
(Original)  
   
                                                    atorvastatin 40 mg   
oral tablet  
(20 sources)                            HMG-CoA   
Reductase   
Inhibitor                               Start:   
2024  
End:   
2025                              take 1 tablet by   
mouth once daily                        atorvastatin   
(Lipitor) 40 mg   
tablet Indications:   
Mixed hyperlipidemia   
Take 1 tablet (40   
mg) by mouth once   
daily. 30 tablet 11   
2024 Active  
  
  
  
                                                    Start: 10-  
End: 2024                         take 1 tablet by mouth once   
daily                                   atorvastatin (LIPITOR) 40 MG tablet   
Take 1 (one) tablet (40 mg total) by   
mouth daily . 90 tablet 3 2024   
Active  
   
                                                    Start: 2018  
End: 2019                         take 1 tablet by mouth once   
daily                                   atorvastatin (LIPITOR) 40 MG tablet   
Take 1 (one) tablet (40 mg total) by   
mouth daily . 90 tablet 3 2019   
Active  
  
  
  
                                                    cholecalciferol 0.01 mg chew  
able   
tablet  
(20 sources)    Vitamin D                                       cholecalciferol   
(Vitamin D-3) 10   
mcg (400 unit) tablet,chewable   
Chew. Active  
  
  
  
                                                                cholecalciferol,  
 vitamin D3, 10 mcg (400 unit) Chew Chew and Swallow . Active  
  
  
  
                                                    24 hr dilTIAZem   
hydrochloride 180   
mg extended release   
oral capsule  
(20 sources)                            Calcium Channel   
Blocker                                 Start:   
2021  
End: 2024                         take 1 capsule   
by mouth once   
daily                                   dilTIAZem CD   
(Cardizem CD) 180   
mg 24 hr capsule   
Take 1 capsule   
(180 mg) by mouth   
once daily.   
2024 Active  
   
                                                    empagliflozin 10 mg   
oral tablet  
(1 source)                              Sodium-Glucose   
Cotransporter 2   
Inhibitor                               Start:   
2024  
End: 2025                         take 1 tablet   
by mouth once   
daily                                   empagliflozin   
(Jardiance) 10 mg   
Indications:   
Controlled type 2   
diabetes mellitus   
without   
complication,   
without long-term   
current use of   
insulin (Multi)   
Take 1 tablet (10   
mg) by mouth once   
daily. 90 tablet 3   
2024 Active  
   
                                                    ferrous sulfate 325   
mg delayed release   
oral tablet  
(1 source)                                                  take 1 tablet   
by mouth once   
daily at   
breakfast                               ferrous sulfate   
325 (65 Fe) MG EC   
tablet Take 1   
tablet by mouth   
once daily with   
breakfast. Do not   
crush, chew, or   
split. Active  
   
                                                    furosemide 40 mg   
oral tablet  
(20 sources)              Loop Diuretic             Start:   
2024  
End: 2025                         take 1 tablet   
by mouth once   
daily                                   furosemide (Lasix)   
40 mg tablet   
Indications:   
Paroxysmal atrial   
fibrillation   
(Multi) Take 1   
tablet (40 mg) by   
mouth once daily.   
30 tablet 11   
2024 Active  
  
  
  
                                                    Start: 2022  
End: 2024                         take 1 tablet by mouth once   
daily                                   furosemide (Lasix) 40 MG tablet   
Take 1 (one) tablet (40 mg total)   
by mouth daily . 90 tablet 3   
2024 Active  
  
  
  
                                                    5 ml lidocaine   
hydrochloride 10   
mg/ml prefilled   
syringe  
(1 source)                              Antiarrhythmic, Amide   
Local Anesthetic                        Start:   
2023  
End: 2023                                     lidocaine HCl/PF   
(lidocaine, PF,)   
50 mg/5 mL (1 %)   
Syrg Indications:   
Left-sided back   
pain, unspecified   
back location,   
unspecified   
chronicity ,   
Lumbar   
degenerative disc   
disease , Spinal   
stenosis of   
lumbar region   
with neurogenic   
claudication   
Inject 5 mL (50   
mg total) as   
directed once for   
1 dose . 5 mL 0   
2023 Active  
   
                                                    lisinopril 20 mg   
oral tablet  
(20 sources)                            Angiotensin   
Converting Enzyme   
Inhibitor                               Start:   
2024                              take 1   
tablet by   
mouth once   
daily                                   lisinopriL   
(PRINIVIL,ZESTRIL  
) 20 MG tablet   
Take 1 (one)   
tablet (20 mg   
total) by mouth   
daily . 90 tablet   
3 2024   
Active  
  
  
  
                                                    Start: 2020  
End: 2024                         take 1 tablet by mouth once   
daily                                   lisinopriL (PRINIVIL,ZESTRIL) 20 MG   
tablet TAKE ONE TABLET BY MOUTH DAILY   
90 tablet 3 2023   
Discontinued (Reorder (Suppress   
CancelRx Message to Pharmacy))  
   
                                                    Start: 2018  
End: 2019                         take 1 tablet by mouth once   
daily                                   lisinopril (PRINIVIL,ZESTRIL) 20 MG   
tablet Take 1 (one) tablet (20 mg

## 2024-12-16 LAB — BNP,B-TYPE NATRIURETIC PEPTIDE: 83 PG/ML (ref 0–100)

## 2024-12-17 ENCOUNTER — HOSPITAL ENCOUNTER (OUTPATIENT)
Dept: HOSPITAL 100 - LAB.FUTURE | Age: 69
Discharge: HOME | End: 2024-12-17
Payer: COMMERCIAL

## 2024-12-17 DIAGNOSIS — E11.9: Primary | ICD-10-CM

## 2024-12-17 LAB
CREAT UR-MCNC: 104 MG/DL
MICROALBUMIN UR-MCNC: 19.1 MG/L

## 2024-12-17 PROCEDURE — 82570 ASSAY OF URINE CREATININE: CPT

## 2024-12-17 PROCEDURE — 82043 UR ALBUMIN QUANTITATIVE: CPT

## 2024-12-17 NOTE — XMS RPT_ITS
Comprehensive CCD (C-CDA v2.1)  
  
                          Created on: 2024  
  
  
Nick Cloud  
External Reference #: CDR,PersonID:[id number]  
: [id number]  
Sex: Male  
  
Demographics  
  
  
                                        Address             [address] [place]  [address]  
   
                                        Mobile Phone        [phone]  
   
                                        Home Phone          [phone]  
   
                                        Work Phone          [phone]  
   
                                        Preferred Language  en  
   
                                        Marital Status        
   
                                        Faith Affiliation Unknown  
   
                                        Race                White  
   
                                        Ethnic Group        Not  or Lati  
no  
  
  
Author  
  
  
                                        Organization        [facility]  
  
  
Care Team Providers  
  
  
                                Care Team Member Name Role            Phone  
   
                                Jason Bonilla Unavailable     Unavai  
lable  
   
                                WoodSoledad L Attending       Unavailable  
   
                                Furness, Jason T Primary Care    Unavailable  
   
                                Wood, Soledad L Admitting       Unavailable  
   
                                Wood, Soledad L Attending       Unavailable  
   
                                Furness, Jason T Primary Care    Unavailable  
   
                                Wood, Soledad L Attending       Unavailable  
   
                                Furness, Jason T Primary Care    Unavailable  
   
                                Wood, Soledad L Admitting       Unavailable  
   
                                RosenRl    Attending       Unavailable  
   
                                Furness, Jason MCCULLOUGH Primary Care    Unavailable  
   
                                RosenRl avendaño    Attending       Unavailable  
   
                                Furness, Jason MCCULLOUGH Primary Care    Unavailable  
   
                                FurnessJason Unavailable     Unavailable  
   
                                Furness, Jason MCCULLOUGH Unavailable     Unavailable  
   
                                Rj Oconnell Unavailable     Unavailable  
   
                                FurnessJason Primary Care Provider   
[phone]  
   
                                Latter-day UROLOGY PROCEDURE RM, [id number] Unava  
ilable     Unavailable  
   
                                Rosen II, Rl   Unavailable     Unavailable  
   
                                Furness, Jason MCCULLOUGH Unavailable     [phone]  
   
                                Unavailable     Unavailable     [phone]  
   
                                Jason Bonilla MD Primary Care Provid  
er [phone]  
   
                                Jason Bonilla MD Primary Care Provid  
er [phone]  
   
                                Unavailable     Unavailable     [phone]  
   
                                Jason Bonilla MD Primary Care Provid  
er [phone]  
   
                                JASON BONILLA Primary Care    Unavailable  
   
                                FURNESSJASON Referring       Unavailable  
   
                                FURNESSJASON Attending       Unavailable  
   
                                FURNESS, JASON MCCULLOUGH Referring       Unavailable  
   
                                FURNESS, JASON MCCULLOUGH Attending       Unavailable  
   
                                FURNESS, JASON MCCULLOUGH Primary Care    Unavailable  
   
                                Joel Adame  Attending       Unavailable  
   
                                Furness, Dr. Jason Lujan Primary Care    Un  
available  
   
                                Furness, Dr. Jason Lujan Referring       Un  
available  
   
                                Joel Adame  Admitting       Unavailable  
   
                                Furness Jason CALZADA Primary Care Provider [phone]  
   
                                Jason Bonilla MD Unavailable     [phone]  
   
                                SYSTEM, PROVIDER NOT IN Referring       Unavaila  
ble  
   
                                SYSTEM, PROVIDER NOT IN Attending       Unavaila  
ble  
   
                                FURNESS, RUST Primary Care    Unavai  
lable  
   
                                FURNESS, RUST Primary Care    Unavai  
lable  
   
                                JAIME DONNA MCGEE Admitting       Unavailable  
   
                                JAIME DO, DONNA Attending       Unavailable  
   
                                FURNESS, JASON  Primary Care    Unavailable  
   
                                VIAU, RJ RAMÍREZ Attending       Unavailable  
   
                                FURNESS, RUST Primary Care    Unavai  
lable  
   
                                VIAU, RJ RAMÍREZ Attending       Unavailable  
   
                                FURNESS, RUST Primary Care    Unavai  
lable  
   
                                FURNESS, RUST Primary Care    Unavai  
lable  
   
                                CAROLEE JAUREGUI Attending       Unavailable  
   
                                MUNDO CORTEZ Referring       Unavailable  
   
                                MUNDO CORTEZ Admitting       Unavailable  
   
                                FURNESS, [facility]    Unavai  
labKENTON Mcdaniel JR. Attending       Unavailable  
   
                                FURNESS, [facility]    Unavai  
MUNDO Stringer Attending       Unavailable  
   
                                MUNDO CORTEZ Admitting       Unavailable  
   
                                Furness Jason CALZADA Primary Care Provider [phone]  
   
                                Tong MARMOLEJO, Vanessa Unavailable     Unavailable  
   
                                MUNDO CORTEZ Attending       Unavailable  
   
                                FURNESS, [facility]    Unavai  
MUNDO Stringer Referring       Unavailable  
   
                                MUNDO CORTEZ Attending       Unavailable  
   
                                FURNESS, RUST Primary Care    UnaMUNDO Murray Referring       Unavailable  
   
                                MUNDO CORTEZ Referring       Unavailable  
   
                                FURNESS, [facility]    Unavai  
labMUNDO Ghosh Referring       Unavailable  
   
                                FURNESS, [facility]    Unavai  
labMUNDO Ghosh Attending       Unavailable  
   
                                MUNDO CORTEZ Referring       Unavailable  
   
                                MUNDO CORTEZ Attending       Unavailable  
   
                                FURNESS, RUST Primary Care    Unavai  
MUNDO Stringer Referring       Unavailable  
   
                                MUNDO CORTEZ Attending       Unavailable  
   
                                FURNESS, RUST Primary Care    Unavai  
lable  
   
                                FURNESS, JASON  Primary Care    Unavailable  
   
                                FURNESS, JASON MCCULLOUGH Primary Care    Unavailable  
   
                                FURNESS, JASON MCCULLOUGH Primary Care    Unavailable  
   
                                FURNESS, JASON T Primary Care    Unavailable  
   
                                FURNESS, JASON T Primary Care    Unavailable  
   
                                Furness Jason CALZADA Primary Care Provider [phone]  
[phone]  
   
                                FURNESS, JASON T Attending       Unavailable  
   
                                FURNESS, JASON T Referring       Unavailable  
   
                                FURNESS, JASON T Primary Care    Unavailable  
   
                                FURNESS, JASON T Attending       Unavailable  
   
                                FURNESS, JASON T Referring       Unavailable  
   
                                FURNESS, JASON T Primary Care    Unavailable  
   
                                FURNESS, JASON T Attending       Unavailable  
   
                                FURNESS, JASON T Primary Care    Unavailable  
   
                                FURNESS, JASON T Attending       Unavailable  
   
                                FURNESS, JASON T Referring       Unavailable  
   
                                FURNESS, JASON T Primary Care    Unavailable  
   
                                FURNESS, JASON T Attending       Unavailable  
   
                                FURNESS, JASON T Referring       Unavailable  
   
                                FURNESS, JASON T Primary Care    Unavailable  
   
                                FURNESS, JASON T Attending       Unavailable  
   
                                FURNESS, JASON T Referring       Unavailable  
   
                                FURNESS, JASON T Primary Care    Unavailable  
  
  
  
Allergies  
  
  
                                                    Allergy   
Classification                          Reported   
Allergen(s)               Allergy Type              Date of   
Onset                     Reaction(s)               Facility  
   
                                                      
(1 source)                              Lidocaine;   
Translations:   
[lidocaine]     Drug Allergy                                    [facility]   
Repository  
  
  
  
Medications  
Current Medications  
  
  
  
                      Medication Drug Class(es) Dates      Sig (Normalized) Sig   
(Original)  
   
                                                    atorvastatin 40 mg   
oral tablet  
(20 sources)                            HMG-CoA   
Reductase   
Inhibitor                               Start:   
2024  
End:   
2025                              take 1 tablet by   
mouth once daily                        atorvastatin   
(Lipitor) 40 mg   
tablet Indications:   
Mixed hyperlipidemia   
Take 1 tablet (40   
mg) by mouth once   
daily. 30 tablet 11   
2024 Active  
  
  
  
                                                    Start: 10-  
End: 2024                         take 1 tablet by mouth once   
daily                                   atorvastatin (LIPITOR) 40 MG tablet   
Take 1 (one) tablet (40 mg total) by   
mouth daily . 90 tablet 3 2024   
Active  
   
                                                    Start: 2018  
End: 2019                         take 1 tablet by mouth once   
daily                                   atorvastatin (LIPITOR) 40 MG tablet   
Take 1 (one) tablet (40 mg total) by   
mouth daily . 90 tablet 3 2019   
Active  
  
  
  
                                                    cholecalciferol 0.01 mg chew  
able   
tablet  
(20 sources)    Vitamin D                                       cholecalciferol   
(Vitamin D-3) 10   
mcg (400 unit) tablet,chewable   
Chew. Active  
  
  
  
                                                                cholecalciferol,  
 vitamin D3, 10 mcg (400 unit) Chew Chew and Swallow . Active  
  
  
  
                                                    24 hr dilTIAZem   
hydrochloride 180   
mg extended release   
oral capsule  
(20 sources)                            Calcium Channel   
Blocker                                 Start:   
2021  
End: 2024                         take 1 capsule   
by mouth once   
daily                                   dilTIAZem CD   
(Cardizem CD) 180   
mg 24 hr capsule   
Take 1 capsule   
(180 mg) by mouth   
once daily.   
2024 Active  
   
                                                    empagliflozin 10 mg   
oral tablet  
(1 source)                              Sodium-Glucose   
Cotransporter 2   
Inhibitor                               Start:   
2024  
End: 2025                         take 1 tablet   
by mouth once   
daily                                   empagliflozin   
(Jardiance) 10 mg   
Indications:   
Controlled type 2   
diabetes mellitus   
without   
complication,   
without long-term   
current use of   
insulin (Multi)   
Take 1 tablet (10   
mg) by mouth once   
daily. 90 tablet 3   
2024 Active  
   
                                                    ferrous sulfate 325   
mg delayed release   
oral tablet  
(1 source)                                                  take 1 tablet   
by mouth once   
daily at   
breakfast                               ferrous sulfate   
325 (65 Fe) MG EC   
tablet Take 1   
tablet by mouth   
once daily with   
breakfast. Do not   
crush, chew, or   
split. Active  
   
                                                    furosemide 40 mg   
oral tablet  
(20 sources)              Loop Diuretic             Start:   
2024  
End: 2025                         take 1 tablet   
by mouth once   
daily                                   furosemide (Lasix)   
40 mg tablet   
Indications:   
Paroxysmal atrial   
fibrillation   
(Multi) Take 1   
tablet (40 mg) by   
mouth once daily.   
30 tablet 11   
2024 Active  
  
  
  
                                                    Start: 2022  
End: 2024                         take 1 tablet by mouth once   
daily                                   furosemide (Lasix) 40 MG tablet   
Take 1 (one) tablet (40 mg total)   
by mouth daily . 90 tablet 3   
2024 Active  
  
  
  
                                                    5 ml lidocaine   
hydrochloride 10   
mg/ml prefilled   
syringe  
(1 source)                              Antiarrhythmic, Amide   
Local Anesthetic                        Start:   
2023  
End: 2023                                     lidocaine HCl/PF   
(lidocaine, PF,)   
50 mg/5 mL (1 %)   
Syrg Indications:   
Left-sided back   
pain, unspecified   
back location,   
unspecified   
chronicity ,   
Lumbar   
degenerative disc   
disease , Spinal   
stenosis of   
lumbar region   
with neurogenic   
claudication   
Inject 5 mL (50   
mg total) as   
directed once for   
1 dose . 5 mL 0   
2023 Active  
   
                                                    lisinopril 20 mg   
oral tablet  
(20 sources)                            Angiotensin   
Converting Enzyme   
Inhibitor                               Start:   
2024                              take 1   
tablet by   
mouth once   
daily                                   lisinopriL   
(PRINIVIL,ZESTRIL  
) 20 MG tablet   
Take 1 (one)   
tablet (20 mg   
total) by mouth   
daily . 90 tablet   
3 2024   
Active  
  
  
  
                                                    Start: 2020  
End: 2024                         take 1 tablet by mouth once   
daily                                   lisinopriL (PRINIVIL,ZESTRIL) 20 MG   
tablet TAKE ONE TABLET BY MOUTH DAILY   
90 tablet 3 2023   
Discontinued (Reorder (Suppress   
CancelRx Message to Pharmacy))  
   
                                                    Start: 2018  
End: 2019                         take 1 tablet by mouth once   
daily                                   lisinopril (PRINIVIL,ZESTRIL) 20 MG   
tablet Take 1 (one) tablet (20 mg   
total) by mouth daily . 90 tablet 3   
2019 Active  
  
  
  
                                                    metFORMIN   
hydrochloride 500 mg   
oral tablet  
(20 sources)              Biguanide                 Start: 2024  
End: 2025                         take 1 tablet   
by mouth twice   
daily                                   metFORMIN   
(Glucophage) 500 mg   
tablet Indications:   
Controlled type 2   
diabetes mellitus   
without complication,   
without long-term   
current use of   
insulin (Multi) Take   
1 tablet (500 mg) by   
mouth 2 times daily   
(morning and late   
afternoon).   
2024   
Active  
  
  
  
                                                    Start: 2024  
End: 10-                         take 1 tablet by mouth twice   
daily                                   metFORMIN (Glucophage) 500 mg tablet   
Indications: Controlled type 2   
diabetes mellitus without   
complication, without long-term   
current use of insulin (Multi) Take   
1 tablet (500 mg) by mouth 2 times   
daily (morning and late afternoon).   
60 tablet 11 2024 10/07/2024   
Discontinued (Therapy completed)  
   
                                                    Start: 2023  
End: 2024                         take 1 tablet by mouth twice   
daily at mealtime                       metFORMIN (Glucophage) 500 mg tablet   
Indications: Controlled type 2   
diabetes mellitus without   
complication, without long-term   
current use of insulin (CMS/HCC)   
Take 1 tablet (500 mg) by mouth 2   
times a day with meals. 60 tablet 11   
2023 Active  
   
                                        Start: 10-   take 1 tablet by nika  
th every   
twelve hours                            metFORMIN (GLUCOPHAGE) 500 MG tablet   
Take 1 Unspecified by mouth every 12   
(twelve) hours . 10/08/2020 Active  
   
                                        Start: 10-   take 1 tablet by nika  
th twice   
daily at mealtime                       metFORMIN HCl - 500 MG Oral Tablet   
TAKE 1 TABLET TWICE DAILY WITH   
MEALS. Quantity: 180 Refills: 3   
Ordered: 2022 Jason Bonilla MD Start : 8-Oct-2020 Active  
  
  
  
                                                    metoprolol   
tartrate 50 mg   
oral tablet  
(20 sources)                            beta-Adrenergic   
Blocker                                 Start:   
2024  
End: 2025                         take 0.5   
tablet by   
mouth every   
twelve hours                            metoprolol tartrate   
(Lopressor) 50 mg   
tablet Indications:   
Hypertension,   
essential Take 0.5   
tablets by mouth   
every 12 hours. 30   
tablet 11   
2024 Active  
  
  
  
                                                    Start: 2023  
End: 2024                         take 1 tablet by mouth twice   
daily                                   metoprolol tartrate (LOPRESSOR) 50   
MG tablet Take 1 (one) tablet (50 mg   
total) by mouth 2 (two) times a day   
. 180 tablet 3 2024 Active  
   
                                                    Start: 2020  
End: 2022                         take 1 tablet by mouth twice   
daily                                   metoprolol tartrate (LOPRESSOR) 50   
MG tablet TAKE ONE TABLET BY MOUTH   
TWICE A  tablet 0 2022   
Active  
   
                                                    Start: 10-  
End: 2020                                     metoprolol tartrate (LOPRESS  
OR) 50   
MG tablet TAKE 1 TABLET TWICE A DAY   
180 tablet 0 10/09/2020 2020   
Discontinued (Reorder)  
   
                                                    Start: 10-  
End: 2024                         take 1 tablet by mouth every   
twelve hours                            metoprolol tartrate (Lopressor) 50   
mg tablet Indications: Hypertension,   
essential Take 1 tablet (50 mg) by   
mouth every 12 hours. 60 tablet 11   
2023 Active  
   
                                        Start: 10-   take 1 tablet by nika  
th once   
daily                                   Lopressor 50 MG Oral Tablet TAKE 1   
TABLET EVERY 12 HOURS DAILY.   
Quantity: 0 Refills: 0 Ordered:   
2-Oct-2020 Jason Bonilla MD Start   
: 2-Oct-2020 Active  
   
                                                    Start: 2018  
End: 2019                         take 1 tablet by mouth twice   
daily                                   metoprolol tartrate (LOPRESSOR) 50   
MG tablet Take 1 (one) tablet (50 mg   
total) by mouth 2 (two) times a day   
. 180 tablet 3 2019 Active  
   
                                                            take 1 tablet by nika  
th twice   
daily                                   metoprolol tartrate (LOPRESSOR) 25   
MG tablet Take 1 (one) tablet (25 mg   
total) by mouth 2 (two) times a day   
. Active  
  
  
  
                                                    multivitamin per tablet  
(20 sources)                                                take 1 tablet by nika  
th   
once daily                              multivitamin per tablet Take 1   
(one) tablet by mouth daily .   
Active  
  
  
  
                                                take 1 tablet by mouth once williams  
y multivitamin per tablet Take 1 (one) tablet   
by   
mouth daily . 0 Active  
  
  
  
                                                    nitroglycerin 0.4 mg   
sublingual tablet  
(20 sources)                            Nitrate   
Vasodilator                             Start:   
2017  
End: 2024                                     nitroglycerin   
(Nitrostat) 0.4 mg   
SL tablet Place 1   
tablet (0.4 mg)   
under the tongue   
every 5 minutes if   
needed. 2024   
Active  
   
                                                    microencapsulated   
potassium chloride 20   
meq extended release   
oral tablet  
(20 sources)                                        Start:   
2022  
End: 2025                         take 1   
tablet by   
mouth once   
daily                                   potassium chloride   
CR 20 mEq ER   
tablet   
Indications:   
Hypertension,   
essential Take 1   
tablet (20 mEq) by   
mouth once daily.   
30 tablet 11   
2024   
10/07/2024   
Discontinued   
(Therapy   
completed)  
   
                                                    predniSONE 20 mg oral   
tablet  
(1 source)                                          Start:   
2023  
End: 2023                         take 2   
tablets by   
mouth once   
daily                                   predniSONE   
(Deltasone) 20 mg   
tablet   
Indications:   
Change in voice   
Take 2 tablets (40   
mg) by mouth once   
daily for 5 days.   
10 tablet 0   
2023 Active  
   
                                                    rivaroxaban 20 mg oral   
tablet  
(20 sources)                            Factor Xa   
Inhibitor                               Start:   
2024  
End: 2025                         take 1   
tablet by   
mouth once   
daily                                   rivaroxaban   
(Xarelto) 20 mg   
tablet   
Indications:   
Paroxysmal atrial   
fibrillation   
(Multi) Take 1   
tablet (20 mg) by   
mouth once daily.   
30 tablet 11   
2024 Active  
  
  
  
                                                    Start: 2020  
End: 2024                         take 1 tablet by mouth once   
daily                                   rivaroxaban (Xarelto) 20 mg Tab   
Indications: Chronic atrial   
fibrillation (HCC) Take 1 (one)   
tablet (20 mg total) by mouth daily   
. 90 tablet 3 2024 Active  
  
  
  
                                                    sildenafil 50 mg   
oral tablet  
(20 sources)                            Phosphodiesterase 5   
Inhibitor           Start: 10-                       sildenafil (Viagra)   
50 mg tablet   
Indications: ED   
(erectile   
dysfunction) of   
organic origin Take   
1 tablet (50 mg) by   
mouth if needed for   
erectile   
dysfunction. 10   
tablet 11 10/07/2024   
Active  
  
  
  
                                                    Start: 2020  
End: 10-                                     sildenafil (Viagra) 50 mg ta  
blet Indications: ED (erectile   
dysfunction) of organic origin Take 1 tablet (50 mg) by mouth if   
needed for erectile dysfunction. 10 tablet 3 2023   
10/07/2024 Discontinued (Reorder)  
  
  
  
                                                    urea 400 mg/ml topical   
lotion  
(3 sources)                                         Start: 2023  
End: 2024                                     urea 40 % Lotn Apply 1   
Application topically 2   
(two) times a day .   
226.8 g 0 2023 Active  
   
                                                    Vitamin B Complex  
(4 sources)                                                 take 1 tablet by   
mouth once daily                        b complex vitamins   
tablet Take 1 (one)   
tablet by mouth daily .   
Active  
  
  
  
                                                take 1 tablet by mouth once williams  
y b complex vitamins tablet Take 1 (one) tablet  
 by   
mouth daily . 0 Active  
  
  
  
                                                    VITAMIN B COMPLEX ORAL  
(2 sources)                                                 take 1 tablet by nika  
th once   
daily                                   VITAMIN B COMPLEX ORAL Take 1   
tablet by mouth once daily.   
Active  
  
  
  
Completed/Discontinued Medications  
  
  
  
                      Medication Drug Class(es) Dates      Sig (Normalized) Sig   
(Original)  
   
                                                    aspirin 81 mg   
delayed release   
oral tablet  
(20 sources)                            Platelet   
Aggregation   
Inhibitor,   
Nonsteroidal   
Anti-inflammatory   
Drug                                    Start:   
10-                              take 1 tablet by   
mouth once daily                        Aspirin 81 MG Oral   
Tablet Delayed   
Release TAKE 1   
TABLET DAILY AS   
DIRECTED. Refills:   
0 Jason Bonilla MD Start :   
2-Oct-2020 Active  
   
                                                    ciprofloxacin 250   
mg oral tablet  
(1 source)                              Quinolone   
Antimicrobial                           Start:   
2021                              take 1 tablet by   
mouth twice daily                       Ciprofloxacin HCl   
- 250 MG Oral   
Tablet Take 1   
tablet twice daily   
Quantity: 6   
Refills: 0 Rl Rosen II, MD Start :   
2021 Active  
  
  
  
                                        Start: 2021   take 1 tablet by nika  
th twice   
daily                                   Ciprofloxacin HCl - 250 MG Oral   
Tablet Take 1 tablet twice daily   
Quantity: 6 Refills: 0 Rl Rosen II, MD Start : 2021 Active  
  
  
  
                                                    esomeprazole 40 mg   
delayed release   
oral capsule  
(1 source)                Proton Pump Inhibitor     Start:   
2016  
End: 2019                         take 1   
capsule by   
mouth once   
daily                                   esomeprazole   
(NEXIUM) 40 MG   
capsule Take 40   
mg by mouth daily   
. 2016   
Discontinued  
   
                                                    perflutren lipid   
microspheres   
(DEFINITY) 0.143   
mg/mL solution   
0-10 mL of mixture  
(1 source)                                          Start:   
2020  
End: 2020                                     perflutren lipid   
microspheres   
(DEFINITY) 0.143   
mg/mL solution   
0-10 mL of   
mixture  
   
                                                    72 hr scopolamine   
0.0139 mg/hr   
transdermal system  
(4 sources)               Anticholinergic           Start:   
2023  
End: 2023                                     scopolamine   
(Transderm-Scop)   
1 mg over 3 days   
patch 3 day   
Indications:   
Routine general   
medical   
examination at a   
health care   
facility Place 1   
patch over 72   
hours on the skin   
every 3rd day if   
needed (nausea).   
4 patch 1   
2023   
Discontinued   
(Therapy   
completed)  
  
  
  
                                                    Start: 2023  
End: 10-                                     scopolamine (TRANSDERM-SCOP)  
 1 mg over 3 days patch Place 1   
(one)   
patch on the skin . 0 2023 10/20/2023 Active  
  
  
  
                                                    technetium (Tc-99m) tetrofos  
min   
(Tc-MYOVIEW) injection 8-25   
millicurie  
(2 sources)                                         Start: 2020  
End: 2020                                     technetium (Tc-99m) tetrofos  
min   
(Tc-MYOVIEW) injection 8-25   
millicurie  
  
  
  
                                                    Start: 2020  
End: 2020                                     technetium (Tc-99m) tetrofos  
min (Tc-MYOVIEW) injection 8-25   
millicurie  
  
  
  
                                                    1 ml triamcinolone   
acetonide 40 mg/ml   
injection  
(3 sources)               Corticosteroid            Start: 2024  
End: 2024                                     triamcinolone acetonide   
(KENALOG-40) injection   
120 mg  
  
  
  
                                                    Start: 2023  
End: 2023                                     triamcinolone acetonide (GRIS  
ALOG-40) injection 120 mg  
   
                                                    Start: 2023  
End: 2023                                     triamcinolone acetonide (GRIS  
ALOG-40) injection 120 mg  
  
  
  
Problems  
Active Problems  
  
  
                                                    Problem   
Classification  Problem         Date            Documented Date Episodic/Chronic  
   
                                                    Cardiac dysrhythmias  
(20 sources)                            Typical atrial   
flutter; Translations:   
[Atrial flutter]                        Onset:   
2017                Chronic  
   
                                                    Coronary   
atherosclerosis and   
other heart disease  
(20 sources)                            Coronary   
arteriosclerosis in   
native artery;   
Translations:   
[Coronary   
arteriosclerosis]                       Onset:   
2024                Chronic  
   
                                                    Deficiency and other   
anemia  
(4 sources)                             Iron deficiency anemia   
due to blood loss;   
Translations: [Iron   
deficiency anemia   
secondary to blood   
loss (chronic)]                         Onset:   
10-                10-                Chronic  
   
                                                    Deficiency and other   
anemia  
(4 sources)                             Iron deficiency anemia   
secondary to blood   
loss (chronic);   
Translations: [Iron   
deficiency anemia   
secondary to blood   
loss (chronic)]                         Onset:   
10-                                          Chronic  
   
                                                    Diabetes mellitus   
without complication  
(20 sources)                            Type 2 diabetes   
mellitus without   
complication;   
Translations:   
[Diabetes mellitus   
without mention of   
complication, type II   
or unspecified type,   
not stated as   
uncontrolled]                           Onset:   
2023                Chronic  
   
                                        Comment on above:   Controlled type 2 di  
abetes mellitus without complication,   
without long-term current use of insulin;   
   
                                                    Diabetes mellitus   
without complication  
(3 sources)                             Hyperglycemia;   
Translations:   
[Hyperglycemia]                                             Episodic  
   
                                                    Disorders of lipid   
metabolism  
(19 sources)                            Mixed hyperlipidemia;   
Translations: [Mixed   
hyperlipidemia]                         Onset:   
2023                Chronic  
   
                                                    Diverticulosis and   
diverticulitis  
(14 sources)                            Diverticulosis of   
colon; Translations:   
[Diverticulosis of   
colon (without mention   
of hemorrhage)]                         Onset:   
2022                Chronic  
   
                                                    Esophageal disorders  
(20 sources)                            Gastroesophageal   
reflux disease;   
Translations:   
[Gastroesophageal   
reflux disease without   
esophagitis]                            Onset:   
2023                Chronic  
   
                                                    Essential   
hypertension  
(20 sources)                            Benign essential   
hypertension;   
Translations:   
[Essential   
hypertension]                           Onset:   
2023                Chronic  
   
                                                    Other connective   
tissue disease  
(1 source)                              Pain in left foot;   
Translations: [Pain in   
left foot]                              2023          Episodic  
   
                                                    Other lower   
respiratory disease  
(20 sources)                            Dyspnea on exertion;   
Translations:   
[Dyspnea, unspecified]                  Onset:   
2021                                          Episodic  
   
                                                    Other lower   
respiratory disease  
(6 sources)                             Other forms of   
dyspnea; Translations:   
[Other forms of   
dyspnea]                                Onset:   
2021                                          Episodic  
   
                                                    Other lower   
respiratory disease  
(2 sources)                             Dyspnea; Translations:   
[Shortness of breath]                   Onset:   
10-                09-                Episodic  
   
                                                    Other lower   
respiratory disease  
(1 source)                              Shortness of breath;   
Translations:   
[Shortness of breath]                   Onset:   
10-                                          Episodic  
   
                                                    Other male genital   
disorders  
(1 source)                              Erectile dysfunction   
co-occurrent and due   
to arterial   
insufficiency;   
Translations:   
[Erectile dysfunction   
due to arterial   
insufficiency]                                              Chronic  
   
                                                    Other male genital   
disorders  
(20 sources)                            Male erectile   
dysfunction,   
unspecified;   
Translations:   
[Impotence of organic   
origin]                                 Onset:   
2020                Chronic  
   
                                                    Other male genital   
disorders  
(17 sources)                            Secondary erectile   
dysfunction;   
Translations:   
[Impotence of organic   
origin]                                 Onset:   
2023                Chronic  
   
                                                    Other non-traumatic   
joint disorders  
(6 sources)                             Knee pain;   
Translations: [Knee   
pain]                                                       Episodic  
   
                                                    Other nutritional;   
endocrine; and   
metabolic disorders  
(2 sources)                             Severe obesity;   
Translations: [Class 2   
severe obesity due to   
excess calories with   
serious comorbidity   
and body mass index   
(BMI) of 35.0 to 35.9   
in adult]                               Onset:   
2024                Chronic  
   
                                                    Other nutritional;   
endocrine; and   
metabolic disorders  
(2 sources)                             Morbid (severe)   
obesity due to excess   
calories;   
Translations: [Morbid   
(severe) obesity due   
to excess calories   
(CMS/HCC)]                              Onset:   
2024                                          Chronic  
   
                                                    Other nutritional;   
endocrine; and   
metabolic disorders  
(2 sources)                             Body mass index (BMI)   
35.0-35.9, adult;   
Translations: [Body   
mass index (BMI)   
35.0-35.9, adult]                       Onset:   
2024                                          Chronic  
   
                                                    Other skin disorders  
(1 source)                              Acquired plantar   
keratoderma;   
Translations:   
[Acquired keratosis   
[keratoderma] palmaris   
et plantaris]                           2023          Episodic  
   
                                                    Spondylosis;   
intervertebral disc   
disorders; other back   
problems  
(15 sources)                            Degeneration of lumbar   
intervertebral disc;   
Translations: [Other   
intervertebral disc   
degeneration, lumbar   
region]                                 Onset:   
2023                Chronic  
   
                                                    Unclassified  
(9 sources)                             COVID-19;   
Translations:   
[COVID-19]                              Onset:   
2020                  
   
                                                    Unclassified  
(4 sources)                             Chronic atrial   
fibrillation,   
unspecified;   
Translations: [Chronic   
atrial fibrillation,   
unspecified]                            Onset:   
2022                                            
   
                                                    Unclassified  
(2 sources)                             Permanent atrial   
fibrillation;   
Translations:   
[Permanent atrial   
fibrillation]                           Onset:   
2022                                            
  
  
Past or Other Problems  
  
  
                                                    Problem   
Classification  Problem         Date            Documented Date Episodic/Chronic  
   
                                                    Calculus of urinary   
tract  
(16 sources)                            Calculus of kidney;   
Translations:   
[Recurrent kidney   
stone]                                  Onset:   
2023                Episodic  
   
                                                    Conditions associated   
with dizziness or   
vertigo  
(2 sources)                             Dizziness and   
giddiness;   
Translations:   
[Dizziness and   
giddiness]                              Onset:   
2024                                          Episodic  
   
                                                    Diverticulosis and   
diverticulitis  
(3 sources)                             Diverticulosis of   
colon; Translations:   
[Diverticulosis of   
colon]                                                        
   
                                                    Fever of unknown   
origin  
(15 sources)                            Fever; Translations:   
[Fever, unspecified]                    Onset:   
2023  
Resolved:   
2023                Episodic  
   
                                                    Genitourinary   
symptoms and   
ill-defined   
conditions  
(20 sources)                            Nocturia;   
Translations: [Blood   
in urine]                               Onset:   
2023  
Resolved:   
2023                Episodic  
   
                                                    Malaise and fatigue  
(16 sources)                            Fatigue;   
Translations: [Other   
malaise and fatigue]                    Onset:   
2023  
Resolved:   
2023                Episodic  
   
                                                    Neoplasms of   
unspecified nature or   
uncertain behavior  
(8 sources)                             Neoplasm of uncertain   
behavior of skin;   
Translations:   
[Neoplasm of   
uncertain behavior of   
skin]                                   Onset:   
2023  
Resolved:   
2023                Episodic  
   
                                                    Osteoarthritis  
(13 sources)                            Osteoarthritis of   
right knee joint;   
Translations:   
[Osteoarthrosis,   
unspecified whether   
generalized or   
localized, lower leg]                   Onset:   
2023  
Resolved:   
2023                Chronic  
   
                                                    Osteoarthritis  
(2 sources)                             Osteoarthritis of   
right knee joint;   
Translations:   
[Osteoarthritis of   
right knee]                                                   
   
                                                    Other and unspecified   
benign neoplasm  
(3 sources)                             Personal history of   
colonic polyps;   
Translations:   
[Personal history of   
colonic polyps]                         Onset:   
2022                                          Episodic  
   
                                                    Other circulatory   
disease  
(14 sources)                            Retroperitoneal   
hemorrhage;   
Translations:   
[Hemorrhage,   
unspecified]                            Onset:   
2023  
Resolved:   
2023                Episodic  
   
                                                    Other nervous system   
disorders  
(8 sources)                             Numbness;   
Translations:   
[Disturbance of skin   
sensation]                              Onset:   
2023  
Resolved:   
2023                Episodic  
   
                                                    Other non-traumatic   
joint disorders  
(20 sources)                            Pain in right knee;   
Translations: [Right   
knee pain]                              Onset:   
2023  
Resolved:   
2023                Episodic  
   
                                        Comment on above:   RIGHT;   
   
                                                    Other screening for   
suspected conditions   
(not mental disorders   
or infectious   
disease)  
(2 sources)                             Encounter for   
screening for   
malignant neoplasm of   
colon; Translations:   
[Encounter for   
screening for   
malignant neoplasm of   
colon]                                  Onset:   
2022                                          Episodic  
   
                                                    Other skin disorders  
(2 sources)                             Acquired keratosis   
[keratoderma]   
palmaris et   
plantaris;   
Translations:   
[Acquired keratosis   
(keratoderma)   
palmaris et   
plantaris]                              Onset:   
2023                                          Episodic  
   
                                                    Other upper   
respiratory disease  
(4 sources)                             Change in voice;   
Translations:   
[Unspecified voice   
and resonance   
disorder]                               Onset:   
2023                Episodic  
   
                                                    Spondylosis;   
intervertebral disc   
disorders; other back   
problems  
(20 sources)                            Backache;   
Translations:   
[Dorsalgia,   
unspecified]                            Onset:   
2023                Episodic  
   
                                                    Unclassified  
(4 sources)                                         Onset:   
2023  
Resolved:   
2024                  
   
                                                    Viral infection  
(20 sources)                            Disease caused by   
2019-nCoV;   
Translations:   
[COVID-19]                              Onset:   
2020                Episodic  
   
                                                    NEGATED: Highlighted   
row has not   
occurred!Residual   
codes; unclassified  
(14 sources)    Disease                                         Episodic  
  
  
  
Results  
  
  
                          Test Name    Value        Interpretation Reference   
Range                                   Facility  
   
                                                    Basic metabolic 2000 panelon  
 2024   
   
                                                    Anion gap   
[Moles/Vol]     9 mmol/L        Low             10-20           [facility]  
   
                                                    Comment on   
above:                                  Performed By: #### 4548-4 ####  
VALENTE BENSON ([id number])  
[facility] ([facility])  
[address] [place] [id number]   
   
                                                    Calcium   
[Mass/Vol]      9.6 mg/dL       Normal          8.6-10.3        [facility]  
   
                                                    Comment on   
above:                                  Performed By: #### 4548-4 ####  
VALENTE BENSON ([id number])  
[facility] ([facility])  
[address] [place] [id number]   
   
                                                    Chloride   
[Moles/Vol]     106 mmol/L      Normal                    [facility]  
   
                                                    Comment on   
above:                                  Performed By: #### 4548-4 ####  
VALENTE BENSON ([id number])  
[facility] ([facility])  
[address] [place] [address]   
   
                      CO2 [Moles/Vol] 29 mmol/L  Normal     21-32      [facility]  
   
                                                    Comment on   
above:                                  Performed By: #### 4548-4 ####  
VALENTE BENSON ([id number])  
[facility] ([facility])  
[address] [place] [id number]   
   
                                                    Creatinine   
[Mass/Vol]      1.27 mg/dL      Normal          0.50-1.30       [facility]  
   
                                                    Comment on   
above:                                  Performed By: #### 4548-4 ####  
VALENTE BENSON ([id number])  
[facility] ([facility])  
[address] [place] [id number]   
   
                                                    Glomerular   
filtration   
rate/1.73 sq   
M.predicted     61 mL/min/1.73m*2 Normal          >60             [facility]  
   
                                                    Comment on   
above:                                  Result Comment: Calculations of estimate  
d GFR are performed using the   
 CKD-EPI Study Refit equation without the race variable for the   
IDMS-Traceable creatinine methods.  
https://jasn.asnjournals.org/content/early//ASN.4266675774   
   
                                                            Performed By: #### 4  
548-4 ####  
VALENTE BENSON ([id number])  
[facility] ([facility])  
[address] [place] [id number]   
   
                                                    Glucose   
[Mass/Vol]      218 mg/dL       High            74-99           [facility]  
   
                                                    Comment on   
above:                                  Performed By: #### 4548-4 ####  
VALENTE BENSON ([id number])  
[facility] ([facility])  
[address] [place] [id number]   
   
                                                    Potassium   
[Moles/Vol]     4.2 mmol/L      Normal          3.5-5.3         [facility]  
   
                                                    Comment on   
above:                                  Performed By: #### 4548-4 ####  
VALENTE BENSON ([id number])  
[facility] ([facility])  
[address] [place] [address]   
   
                                                    Sodium   
[Moles/Vol]     140 mmol/L      Normal          136-145         [facility]  
   
                                                    Comment on   
above:                                  Performed By: #### 4548-4 ####  
VALENTE BENSON ([id number])  
[facility] ([facility])  
[address] [place] [address]   
   
                                                    Urea nitrogen   
[Mass/Vol]      15 mg/dL        Normal          6-23            [facility]  
   
                                                    Comment on   
above:                                  Performed By: #### 4548-4 ####  
VALENTE BENSON ([id number])  
[facility] ([facility])  
[address] [place] [address]   
   
                                                    CBC panel Auto (Bld)on    
   
                                                    Erythrocyte   
distribution   
width (RBC)   
[Ratio]         21.5 %          High            11.5-14.5       [facility]  
   
                                                    Comment on   
above:                                  Performed By: #### 83087-0 ####  
VALENTE BENSON ([id number])  
[facility] ([facility])  
[address] [place] [address]   
   
                                                    Hematocrit (Bld)   
[Volume   
fraction]       42.7 %          Normal          41.0-52.0       [facility]  
   
                                                    Comment on   
above:                                  Performed By: #### 91546-0 ####  
VALENTE BENSON ([id number])  
[facility] ([facility])  
[address] [place], [place] [id number]   
   
                                                    Hemoglobin (Bld)   
[Mass/Vol]      13.5 g/dL       Normal          13.5-17.5       [facility]  
   
                                                    Comment on   
above:                                  Performed By: #### 80545-8 ####  
VALENTE BENSON ([id number])  
[facility] ([facility])  
[address] [place] [address]   
   
                                                    MCH (RBC)   
[Entitic mass]  28.2 pg         Normal          26.0-34.0       [facility]  
   
                                                    Comment on   
above:                                  Performed By: #### 10306-9 ####  
VALENTE BENSON ([id number])  
[facility] ([facility])  
[address] [place] [address]   
   
                                                    MCHC (RBC)   
[Mass/Vol]      31.6 g/dL       Low             32.0-36.0       [facility]  
   
                                                    Comment on   
above:                                  Performed By: #### 85919-5 ####  
VALENTE BENSON ([id number])  
[facility] ([facility])  
[address] [place] [address]   
   
                                                    MCV (RBC)   
[Entitic vol]   89 fL           Normal                    [facility]  
   
                                                    Comment on   
above:                                  Performed By: #### 07076-0 ####  
VALENTE BENSON ([id number])  
[facility] ([facility])  
[address] [place] [address]   
   
                                                    Nucleated   
RBC/100 WBC   
(Bld) [Ratio]   0.0 /100 WBCs   Normal          0.0-0.0         [facility]  
   
                                                    Comment on   
above:                                  Performed By: #### 81122-3 ####  
VALENTE BENSON ([id number])  
[facility] ([facility])  
[address] [place] [address]   
   
                                                    Platelets (Bld)   
[#/Vol]         151 x10*3/uL    Normal          150-450         [facility]  
   
                                                    Comment on   
above:                                  Performed By: #### 80538-3 ####  
VALENTE BENSON ([id number])  
[facility] ([facility])  
[address] [place] [address]   
   
                                                    RBC (Bld)   
[#/Vol]         4.78 x10*6/uL   Normal          4.50-5.90       [facility]  
   
                                                    Comment on   
above:                                  Performed By: #### 35547-8 ####  
VALENTE BENSON ([id number])  
[facility] ([facility])  
[address] [place] [address]   
   
                                                    WBC (Bld)   
[#/Vol]         5.2 x10*3/uL    Normal          4.4-11.3        [facility]  
   
                                                    Comment on   
above:                                  Performed By: #### 45720-6 ####  
VALENTE BENSON ([id number])  
[facility] ([facility])  
[address] [place] [address]   
   
                                                    HbA1c (Bld) [Mass fraction]o  
n 2024   
   
                                                    Average glucose   
Estimated from   
glycated   
hemoglobin (Bld)   
[Mass/Vol]          166 mg/dL           Normal              Not   
Established                             [facility]  
   
                                                    Comment on   
above:                                  Order Comment: Diagnosis of Diabetes-Markell  
lts  
Non-Diabetic: < or = 5.6%  
Increased risk for developing diabetes: 5.7-6.4%  
Diagnostic of diabetes: > or = 6.5%  
Monitoring of Diabetes  
Age (y)....................... Therapeutic Goal (%)  
Adults: >18.........................<7.0  
Pediatrics: 13-18...................<7.5  
Pediatrics: 7-12....................<8.0  
Pediatrics: 0-6..................... 7.5-8.5  
American Diabetes Association. Diabetes Care 33(S1)   
   
                                                            Performed By: #### 4  
548-4 ####  
VALENTE BENSON ([id number])  
[facility] ([facility])  
[address] [place] [address]   
   
                                                    Hemoglobin A1c/Hemoglobin.to  
alvin 2024   
   
                                                    HbA1c (Bld)   
[Mass fraction] 7.4 %           High            See comment     [facility]  
   
                                                    Comment on   
above:                                  Order Comment: Diagnosis of Diabetes-Markell  
lts  
Non-Diabetic: < or = 5.6%  
Increased risk for developing diabetes: 5.7-6.4%  
Diagnostic of diabetes: > or = 6.5%  
Monitoring of Diabetes  
Age (y)....................... Therapeutic Goal (%)  
Adults: >18.........................<7.0  
Pediatrics: 13-18...................<7.5  
Pediatrics: 7-12....................<8.0  
Pediatrics: 0-6..................... 7.5-8.5  
American Diabetes Association. Diabetes Care 33(S1)   
   
                                                            Performed By: #### 4  
548-4 ####  
VALENTE BENSON ([id number])  
[facility] ([facility])  
[facility] [place] [address]   
   
                                                    BASIC METABOLIC PANELon 10-   
   
                                                    Calcium   
[Mass/Vol]      9.4 mg/dL       Normal          8.6-10.3        [facility]  
   
                                                    Comment on   
above:                                  Order Comment: FASTING:NO  
FASTING: NO   
   
                                                            Performed By: #### 1  
0165, 7998 ####  
[facility] [address], [address] [place] [address]  
Medical Director: Narinder Willams MD   
   
                                                    Chloride   
[Moles/Vol]     103 mmol/L      Normal                    [facility]  
   
                                                    Comment on   
above:                                  Order Comment: FASTING:NO  
FASTING: NO   
   
                                                            Performed By: #### 1  
0165, 7998 ####  
[facility] [address], [address] [place] [address]  
Medical Director: Narinder Willams MD   
   
                      CO2 [Moles/Vol] 28 mmol/L  Normal     20-32      [facility]  
   
                                                    Comment on   
above:                                  Order Comment: FASTING:NO  
FASTING: NO   
   
                                                            Performed By: #### 1  
0165, 7998 ####  
[facility] [address], [address] [place] [address]  
Medical Director: Narinder Willams MD   
   
                                                    Creatinine   
[Mass/Vol]      1.33 mg/dL      High            0.70-1.28       [facility]  
   
                                                    Comment on   
above:                                  Order Comment: FASTING:NO  
FASTING: NO   
   
                                                            Performed By: #### 1  
0165, 7998 ####  
[facility] [place] [address]  
Medical Director: Narinder Willams MD   
   
                                                    GFR/1.73 sq   
M.predicted   
among non-blacks   
MDRD (S/P/Bld)   
[Vol rate/Area] 58 mL/min/{1.73_m2} Low             > OR = 60       [facility]  
   
                                                    Comment on   
above:                                  Order Comment: FASTING:NO  
FASTING: NO   
   
                                                            Performed By: #### 1  
0165, 7998 ####  
[facility] [address], [address] [place] [address]  
Medical Director: Narinder Willams MD   
   
                                                    Glucose   
[Mass/Vol]      170 mg/dL       High                      [facility]  
   
                                                    Comment on   
above:                                  Order Comment: FASTING:NO  
FASTING: NO   
   
                                                            Result Comment:  
Non-fasting reference interval  
For someone without known diabetes, a glucose  
value >125 mg/dL indicates that they may have  
diabetes and this should be confirmed with a  
follow-up test.   
   
                                                            Performed By: #### 1  
0165, 7998 ####  
[facility] of [address], [address] [place] [address]  
Medical Director: Narinder Willams MD   
   
                                                    Potassium   
[Moles/Vol]     3.9 mmol/L      Normal          3.5-5.3         [facility]  
   
                                                    Comment on   
above:                                  Order Comment: FASTING:NO  
FASTING: NO   
   
                                                            Performed By: #### 1  
0165, 7998 ####  
[facility] of [address], [address] [place] [address]  
Medical Director: Narinder Willams MD   
   
                                                    Sodium   
[Moles/Vol]     140 mmol/L      Normal          135-146         [facility]  
   
                                                    Comment on   
above:                                  Order Comment: FASTING:NO  
FASTING: NO   
   
                                                            Performed By: #### 1  
0165, 7998 ####  
[facility] [address], [address] [place] [address]  
Medical Director: Narinder Willams MD   
   
                                                    Urea nitrogen   
[Mass/Vol]      18 mg/dL        Normal          7-25            [facility]  
   
                                                    Comment on   
above:                                  Order Comment: FASTING:NO  
FASTING: NO   
   
                                                            Performed By: #### 1  
0165, 7998 ####  
[facility] [place] [address]  
Medical Director: Narinder Willams MD   
   
                                                    Urea   
nitrogen/Creatin  
ine [Mass ratio] 14 mg/mg        Normal          6-22            [facility]  
   
                                                    Comment on   
above:                                  Order Comment: FASTING:NO  
FASTING: NO   
   
                                                            Performed By: #### 1  
0165, 7998 ####  
[facility] of [place] [address]  
Medical Director: Narinder Willams MD   
   
                                                    HEMOGLOBIN + HEMATOCRITon 10  
-   
   
                                                    Hematocrit (Bld)   
[Volume   
fraction]       35.4 %          Low             38.5-50.0       [facility]  
   
                                                    Comment on   
above:                                  Performed By: #### 73900, 7998 ####  
[facility] of [address], [address] [place] [address]  
Medical Director: Narinder Willams MD   
   
                                                    Hemoglobin (Bld)   
[Mass/Vol]      11.0 g/dL       Low             13.2-17.1       [facility]  
   
                                                    Comment on   
above:                                  Performed By: #### 24486, 7998 ####  
[facility] [address], [address] [place] [address]  
Medical Director: Narinder Willams MD   
   
                                                    BASIC METABOLIC PANELon 10-   
   
                                                    BUN/CREATININE   
RATIO           SEE NOTE:       Normal                      [facility]  
   
                                                    Comment on   
above:                                  Order Comment: FASTING:YES  
FASTING: YES   
   
                                                            Result Comment: Not   
Reported: BUN and Creatinine are within  
reference range.   
   
                                                            Performed By: #### 1  
0165 ####  
[facility] [address], [address] [place] [address]  
Medical Director: Narinder Willams MD   
   
                                                    Calcium   
[Mass/Vol]      9.5 mg/dL       Normal          8.6-10.3        [facility]  
   
                                                    Comment on   
above:                                  Order Comment: FASTING:YES  
FASTING: YES   
   
                                                            Performed By: #### 1  
0165 ####  
[facility] [address], [address] [place] [address]  
Medical Director: Narinder Willams MD   
   
                                                    Chloride   
[Moles/Vol]     103 mmol/L      Normal                    [facility]  
   
                                                    Comment on   
above:                                  Order Comment: FASTING:YES  
FASTING: YES   
   
                                                            Performed By: #### 1  
0165 ####  
[facility] [address], [address] [place] [address]  
Medical Director: Narinder Willams MD   
   
                      CO2 [Moles/Vol] 29 mmol/L  Normal     20-32      [facility]  
   
                                                    Comment on   
above:                                  Order Comment: FASTING:YES  
FASTING: YES   
   
                                                            Performed By: #### 1  
0165 ####  
[facility] [address], [address] [place] [address]  
Medical Director: Narinder Willams MD   
   
                                                    Creatinine   
[Mass/Vol]      1.11 mg/dL      Normal          0.70-1.28       [facility]  
   
                                                    Comment on   
above:                                  Order Comment: FASTING:YES  
FASTING: YES   
   
                                                            Performed By: #### 1  
0165 ####  
[facility] of [address], [address] [place] [address]  
Medical Director: Narinder Willams MD   
   
                                                    GFR/1.73 sq   
M.predicted   
among non-blacks   
MDRD (S/P/Bld)   
[Vol rate/Area] 71 mL/min/{1.73_m2} Normal          > OR = 60       [facility]  
   
                                                    Comment on   
above:                                  Order Comment: FASTING:YES  
FASTING: YES   
   
                                                            Performed By: #### 1  
0165 ####  
[facility] [address], [address] [place] [address]  
Medical Director: Narinder Willams MD   
   
                                                    Glucose   
[Mass/Vol]      241 mg/dL       High            65-99           [facility]  
   
                                                    Comment on   
above:                                  Order Comment: FASTING:YES  
FASTING: YES   
   
                                                            Result Comment:  
Fasting reference interval  
For someone without known diabetes, a glucose  
value >125 mg/dL indicates that they may have  
diabetes and this should be confirmed with a  
follow-up test.   
   
                                                            Performed By: #### 1  
0165 ####  
[facility] [address], [address] [place] [address]  
Medical Director: Narinder Willams MD   
   
                                                    Potassium   
[Moles/Vol]     4.1 mmol/L      Normal          3.5-5.3         [facility]  
   
                                                    Comment on   
above:                                  Order Comment: FASTING:YES  
FASTING: YES   
   
                                                            Performed By: #### 1  
0165 ####  
[facility] [address], [address] [place] [address]  
Medical Director: Narinder Willams MD   
   
                                                    Sodium   
[Moles/Vol]     138 mmol/L      Normal          135-146         [facility]  
   
                                                    Comment on   
above:                                  Order Comment: FASTING:YES  
FASTING: YES   
   
                                                            Performed By: #### 1  
0165 ####  
[facility] [address], [address] [place] [address]  
Medical Director: Narinder Willams MD   
   
                                                    Urea nitrogen   
[Mass/Vol]      16 mg/dL        Normal          7-25            [facility]  
   
                                                    Comment on   
above:                                  Order Comment: FASTING:YES  
FASTING: YES   
   
                                                            Performed By: #### 1  
0165 ####  
[facility] [address], [address] [place] [address]  
Medical Director: Narinder Willams MD   
   
                                                    Ferritinon 10-   
   
                                                    Ferritin   
[Mass/Vol]      27 ng/mL        Normal                    [facility]  
   
                                                    Comment on   
above:                                  Performed By: #### 2276-4 ####  
VICTOR KELLEY ([id number])  
[facility] ([facility])  
[address] [place] [address]   
   
                                                    Iron and Iron binding capaci  
ty panelon 10-   
   
                      Iron [Mass/Vol] 18 ug/dL   Low             [facility]  
   
                                                    Comment on   
above:                                  Performed By: #### 03242-8 ####  
VALENTE BENSON ([id number])  
[facility] ([facility])  
[address] [place] [address]   
   
                                                    Iron binding   
capacity   
[Mass/Vol]      392 ug/dL       Normal          240-445         [facility]  
   
                                                    Comment on   
above:                                  Performed By: #### 68458-6 ####  
VALENTE BENSON ([id number])  
[facility] ([facility])  
[address] [place] [address]   
   
                                                    Iron binding   
capacity.unsatur  
ated [Mass/Vol] 374 ug/dL       High            110-370         [facility]  
   
                                                    Comment on   
above:                                  Performed By: #### 66947-8 ####  
VALENTE BENSON ([id number])  
[facility] ([facility])  
[address] [place] [address]   
   
                                                    Iron saturation   
[Mass fraction] 5 %             Low             25-45           [facility]  
   
                                                    Comment on   
above:                                  Performed By: #### 69883-2 ####  
VALENTE BENSON ([id number])  
[facility] ([facility])  
[address] [place] [address]   
   
                                                    NM MYOCARDIAL PERFUSION MULT  
I SPECTon 10-   
   
                                                    NM MYOCARDIAL   
PERFUSION MULTI   
SPECT                                   Patient Info  
Name: NICK CLOUD  
Age: 69 years  
: 1955  
Gender: Male  
MRN: [id number]  
Accession #: [id number]  
Ht: 175 cm  
Wt: 111 kg  
BSA: 2.37 m2  
HR: 96 bpm  
BP: 131  
/ 95 mmHg  
Heart Rhythm: Atrial   
Fibrillation  
Exam Date: 10/4/2024 8:00 AM  
Patient Status: Outpatient  
Nuclear Technologist: Katherine Benjamin, RT(N), RANJANA, RENÉE,   
Oz Benjamin  
RT(N), NCT  
Exam Type: NM MYOCARDIAL   
PERFUSION MULTI SPECT  
Study Info  
Indications  
- CAD screening, high CAD   
risk, treadmill candidate;   
shortness of  
breath  
Nuclear Physician: Shae Morton MD  
Referring Physician: irene;  
[id number]  
Primary Nurse: Yolanda Narvaez RN, [place]  
Supervising Stress Physician:   
Shae Morton MD  
BMI: 36.28 kg/m2  
Summary  
1. This is an abnormal   
Lexiscan nuclear perfusion   
stress test.  
2. Baseline ECG atrial   
fibrillation, no ischemic ECG   
changes noted with  
Lexiscan infusion. Frequent   
PVCs noted.  
3. There is a large sized,   
severe, predominantly fixed   
defect of the mid  
to  
distal anteroseptal wall   
extending to the apex. Total   
ischemic burden  
approximately 5%.  
4. The stress/rest left   
ventricular cavity ratio   
(Transient Ischemic  
Dilatation ratio) = 1.19.  
5. Ejection fraction reporting   
may be inaccurate due to   
absence of  
counts in  
the anterior wall however   
ejection fraction does   
visually appear to  
decrease  
from rest to stress, with a   
poststress ejection fraction   
of 42%.  
6. Overall high-risk study   
based on SCAI criteria (>3%   
predicted annual  
cardiac mortality).  
History/Risk Factors  
Hypertension: Yes  
Dyslipidemia: Yes  
Myocardial Infarction (MI):   
Yes  
Obesity: Yes  
Coronary Artery Disease (CAD)   
Yes  
Date of Last Tobacco Use:   
2008  
Date of Prior MI: 2014  
Diabetes Mellitus: Yes  
Tobacco Use: Former  
Family History: Coronary   
Artery Disease  
Prior Interventions  
Pacemaker: No  
CABG: No  
Radiopharmaceutical: Tc-99m   
Tetrofosmin  
Administration Site: IV -   
right antecubital  
Administered By: Katherine Benjamin RT(N), CNMT, [facility]  
Camera Used: [employer]   
D-SPECT  
Radiopharmaceutical: Tc-99m   
Tetrofosmin  
Administration Site: IV -   
right antecubital  
Administered By: Oz Benjamin RT(N), NCT  
Camera Used: [employer]   
D-SPECT  
Image Protocol  
Protocol: Stress/Rest 1 Day  
Rest  
Radiopharmaceutical Dose: 24.5   
mCi  
Imaging Date AND Time:   
10/4/2024 9:30 AM  
Patient Position: supine  
Stress  
Radiopharmaceutical Dose: 8.7   
mCi  
Imaging Date AND Time:   
10/4/2024 9:09 AM  
Patient Position: upright and   
supine  
Injection to Imaging Time: 15   
min  
Injection to Imaging Time: 60   
min  
Total Radiation Dose: 7.4 mSv  
Injection Date AND Time:   
10/4/2024 9:15 AM  
Injection Date AND Time:   
10/4/2024 8:00 AM  
Procedure(s):  
Gated SPECT images acquired   
upright and supine post   
Tetrofosmin  
injection at  
peak stress. Gated SPECT   
images acquired supine post   
Tetrofosmin  
injection at  
rest. [facility] QGS/QPS   
application was utilized for   
processing and  
interpretation.  
SPECT Results  
Perfusion Findings  
There is a large sized,   
severe, predominantly fixed   
defect of the mid to  
distal anteroseptal wall   
extending to the apex. Total   
ischemic burden  
approximately 5%. The   
stress/rest left ventricular   
cavity ratio (Transient  
Ischemic Dilatation ratio) =   
1.19.  
Summed Difference Score: 4  
Summed Stress Score: 23  
Summed Rest Score: 19  
Perfusion Quantitative Results  
Stress Extent  
Global Stress Extent: 47 %  
Rest Extent  
Global Rest Extent: 37 %  
Ischemia Extent  
Global Ischemia Extent: 43 %  
Functional Results  
------------------------------  
------------------------------  
----------  
Name Value Normal  
------------------------------  
------------------------------  
----------  
Stress  
------------------------------  
------------------------------  
----------  
Stress LV Ejection Fraction 42   
% 55-70  
Stress LV End Diastolic  
Volume Index 72.10 ml/m2  
Stress LV End Systolic  
Volume Index 40.00 ml/m2  
Nuclear Stress Myocardial  
Mass 188.00 g  
Stress LV End Diastolic  
Volume 132.00 ml  
Stress LV End Systolic  
Volume 76.00 ml  
Transient Ischemic  
Dilatation 1.19  
Functional Results  
------------------------------  
------------------------------  
----------  
Name Value Normal  
------------------------------  
------------------------------  
----------  
Rest  
------------------------------  
------------------------------  
----------  
Resting LV Ejection Fraction   
55 % 55-70  
Resting LV End Diastolic  
Volume Index 73.80 ml/m2  
Resting LV End Systolic  
Volume Index 28.90 ml/m2  
Resting LV End Diastolic  
Volume 135.00 ml  
Resting LV End Systolic  
Volume 61.00 ml  
Nuclear Rest Myocardial Mass   
198.00 g  
Functional Findings  
Post stress left ventricular   
ejection fraction is mildly   
reduced, 42 %.  
Resting left ventricular   
ejection fraction is normal ,   
55 %. Ejection  
fraction  
reporting may be inaccurate   
due to absence of counts in   
the anterior wall.  
Stress ECG Details  
Protocol: LEXISCAN  
Rest HR: 96 bpm  
Peak HR: 120 bpm  
Rest Sys BP: (more content not   
included)...        Normal                                  [facility]  
   
                                                    Comment on   
above:                                  Order Comment: Injury/Trauma or Illness?  
:Illness/Other  
How long have you had these symptoms (acute/chronic)?:Unknown  
Reason for exam?:CHRONIC AF, ANAYA, CAD  
Type of Exam?:Unknown  
Additional signs and symptoms?:CHRONIC AF, ANAYA, CAD   
   
                                                    CBC panel Auto (Bld)on 10-02  
-2024   
   
                                                    Erythrocyte   
distribution   
width (RBC)   
[Ratio]         17.0 %          High            11.5-14.5       [facility]  
   
                                                    Comment on   
above:                                  Performed By: #### [id number] ####  
VALENTE BENSON ([id number])  
[facility] ([facility])  
[address] [place] [address]   
   
                                                    Hematocrit (Bld)   
[Volume   
fraction]       35.3 %          Low             41.0-52.0       [facility]  
   
                                                    Comment on   
above:                                  Performed By: #### 60381-6 ####  
VALENTE BENSON ([id number])  
[facility] ([facility])  
[address] [place] [address]   
   
                                                    Hemoglobin (Bld)   
[Mass/Vol]      10.5 g/dL       Low             13.5-17.5       [facility]  
   
                                                    Comment on   
above:                                  Performed By: #### 70680-6 ####  
VALENTE BENSON ([id number])  
[facility] ([facility])  
[address] [place] [address]   
   
                                                    MCH (RBC)   
[Entitic mass]  25.6 pg         Low             26.0-34.0       [facility]  
   
                                                    Comment on   
above:                                  Performed By: #### 66212-4 ####  
VALENTE BENSON ([id number])  
[facility] ([facility])  
[address] [place] [address]   
   
                                                    MCHC (RBC)   
[Mass/Vol]      29.7 g/dL       Low             32.0-36.0       [facility]  
   
                                                    Comment on   
above:                                  Performed By: #### 70624-8 ####  
VALENTE BENSON ([id number])  
[facility] ([facility])  
[address] [place] [address]   
   
                                                    MCV (RBC)   
[Entitic vol]   86 fL           Normal                    [facility]  
   
                                                    Comment on   
above:                                  Performed By: #### 64518-6 ####  
VALENTE BENSON ([id number])  
[facility] ([facility])  
[address] [place] [address]   
   
                                                    Nucleated   
RBC/100 WBC   
(Bld) [Ratio]   0.0 /100 WBCs   Normal          0.0-0.0         [facility]  
   
                                                    Comment on   
above:                                  Performed By: #### 15780-3 ####  
VALENTE BENSON ([id number])  
[facility] ([facility])  
[address] [place] [address]   
   
                                                    Platelets (Bld)   
[#/Vol]         215 x10*3/uL    Normal          150-450         [facility]  
   
                                                    Comment on   
above:                                  Performed By: #### 63735-2 ####  
VALENTE BENSON ([id number])  
[facility] ([facility])  
[address] [place] [address]   
   
                                                    RBC (Bld)   
[#/Vol]         4.10 x10*6/uL   Low             4.50-5.90       [facility]  
   
                                                    Comment on   
above:                                  Performed By: #### 02520-5 ####  
VALENTE BENSON ([id number])  
[facility] ([facility])  
[address] [place] [address]   
   
                                                    WBC (Bld)   
[#/Vol]         8.9 x10*3/uL    Normal          4.4-11.3        [facility]  
   
                                                    Comment on   
above:                                  Performed By: #### 98574-4 ####  
VALENTE BENSON ([id number])  
[facility] ([facility])  
[address] [place] [address]   
   
                                                    Comprehensive metabolic 2000  
 panelon 10-   
   
                                                    Albumin BCP dye   
[Mass/Vol]      3.9 g/dL        Normal          3.4-5.0         [facility]  
   
                                                    Comment on   
above:                                  Performed By: #### 32081-4 ####  
VALENTE BENSON ([id number])  
[facility] ([facility])  
[address] [place] [address]   
   
                                                    ALP [Catalytic   
activity/Vol]   71 U/L          Normal                    [facility]  
   
                                                    Comment on   
above:                                  Performed By: #### 57551-6 ####  
VALENTE BENSON ([id number])  
[facility] ([facility])  
[address] [place] [address]   
   
                                                    ALT With P-5'-P   
[Catalytic   
activity/Vol]   19 U/L          Normal          10-52           [facility]  
   
                                                    Comment on   
above:                                  Result Comment: Patients treated with Macdonald  
lfasalazine may generate   
falsely decreased results for ALT.   
   
                                                            Performed By: #### 2  
4323-8 ####  
VALENTE BENSON ([id number])  
[facility] ([facility])  
[address] [place] [address]   
   
                                                    Anion gap   
[Moles/Vol]     12 mmol/L       Normal          10-20           [facility]  
   
                                                    Comment on   
above:                                  Performed By: #### 81987-7 ####  
VALENTE BENSON ([id number])  
[facility] ([facility])  
[address] [place] [address]   
   
                                                    AST With P-5'-P   
[Catalytic   
activity/Vol]   17 U/L          Normal          9-39            [facility]  
   
                                                    Comment on   
above:                                  Performed By: #### 71413-5 ####  
VALENTE BENSON ([id number])  
[facility] ([facility])  
[address] [place] [address]   
   
                                                    Bilirubin   
[Mass/Vol]      1.0 mg/dL       Normal          0.0-1.2         [facility]  
   
                                                    Comment on   
above:                                  Performed By: #### 95646-5 ####  
VALENTE BENSON ([id number])  
[facility] ([facility])  
[address] [place] [address]   
   
                                                    Calcium   
[Mass/Vol]      9.3 mg/dL       Normal          8.6-10.3        [facility]  
   
                                                    Comment on   
above:                                  Performed By: #### 80502-6 ####  
VALENTE BENSON ([id number])  
[facility] ([facility])  
[address] [place] [address]   
   
                                                    Chloride   
[Moles/Vol]     106 mmol/L      Normal                    [facility]  
   
                                                    Comment on   
above:                                  Performed By: #### 04530-1 ####  
VALENTE BENSON ([id number])  
[facility] ([facility])  
[address] [place] [address]   
   
                      CO2 [Moles/Vol] 26 mmol/L  Normal     21-32      [facility]  
   
                                                    Comment on   
above:                                  Performed By: #### 55634-0 ####  
VALENTE BENSON ([id number])  
[facility] ([facility])  
[address] [place] [address]   
   
                                                    Creatinine   
[Mass/Vol]      1.02 mg/dL      Normal          0.50-1.30       [facility]  
   
                                                    Comment on   
above:                                  Performed By: #### 21752-7 ####  
VALENTE BENSON ([id number])  
[facility] ([facility])  
[address] [place] [address]   
   
                                                    Glomerular   
filtration   
rate/1.73 sq   
M.predicted     79 mL/min/1.73m*2 Normal          >60             [facility]  
   
                                                    Comment on   
above:                                  Result Comment: Calculations of estimate  
d GFR are performed using the   
 CKD-EPI Study Refit equation without the race variable for the   
IDMS-Traceable creatinine methods.  
https://jasn.asnjournals.org/content/early//ASN.2495512727   
   
                                                            Performed By: #### 2  
4323-8 ####  
VALENTE BENSON ([id number])  
[facility] ([facility])  
[address] [place] [address]   
   
                                                    Glucose   
[Mass/Vol]      214 mg/dL       High            74-99           [facility]  
   
                                                    Comment on   
above:                                  Performed By: #### 15695-8 ####  
VALENTE BENSON ([id number])  
[facility] ([facility])  
[address] [place] [address]   
   
                                                    Potassium   
[Moles/Vol]     3.8 mmol/L      Normal          3.5-5.3         [facility]  
   
                                                    Comment on   
above:                                  Performed By: #### 26639-6 ####  
VALENTE BENSON ([id number])  
[facility] ([facility])  
[address] [place] [address]   
   
                                                    Protein   
[Mass/Vol]      5.9 g/dL        Low             6.4-8.2         [facility]  
   
                                                    Comment on   
above:                                  Performed By: #### 14532-0 ####  
VALENTE BENSON ([id number])  
[facility] ([facility])  
[address] [place] [address]   
   
                                                    Sodium   
[Moles/Vol]     140 mmol/L      Normal          136-145         [facility]  
   
                                                    Comment on   
above:                                  Performed By: #### 39362-7 ####  
VALENTE BENSON ([id number])  
[facility] ([facility])  
[address] [place] [address]   
   
                                                    Urea nitrogen   
[Mass/Vol]      16 mg/dL        Normal          6-23            [facility]  
   
                                                    Comment on   
above:                                  Performed By: #### 47724-6 ####  
VALENTE BENSON ([id number])  
[facility] ([facility])  
[address] [place] [address]   
   
                                                    ECHOCARDIOGRAM COMPLETE W CO  
NTRASTon 2024   
   
                                                    ECHOCARDIOGRAM   
COMPLETE W   
CONTRAST                                Patient Info  
Name: NICK CLOUD  
Age: 69 years  
: 1955  
Gender: Male  
MRN: [id number]  
Accession #: [id number]  
Ht: 175 cm  
Wt: 118 kg  
BSA: 2.45 m2  
HR: 80 bpm  
BP: 140  
/ 88 mmHg  
Heart Rhythm: Atrial   
Fibrillation  
Technical Quality: Technically   
difficult  
Exam Date: 2024 10:07 AM  
Patient Status: Outpatient  
Sonographer: Zenaida Dias RN, RCS  
Exam Type: ECHOCARDIOGRAM   
COMPLETE W CONTRAST  
Study Info  
Indications  
- Evaluate LV function  
- Dyspnea  
Referring Physician:   
MUNDO CORTEZ ;  
[id number]  
BMI: 38.53 kg/m2  
Summary  
1. Technically fair quality   
study. Patient in atrial   
fibrillation which  
may  
affect estimation of   
ventricular function and   
transvalvular flows.  
2. Normal left ventricular   
size with severe concentric   
left ventricular  
hypertrophy and low normal   
systolic function, LVEF by   
biplane measurement  
51%.  
3. Normal right ventricular   
size and systolic function.  
4. Mildly dilated left atrium.  
5. No hemodynamically   
significant valvular disease.  
6. Mild pulmonary hypertension   
with estimated RVSP 43 mmHg.  
History/Risk Factors  
Hypertension: Yes  
Dyslipidemia: Yes  
Myocardial Infarction (MI):   
Yes  
Obesity: Yes  
Coronary Artery Disease (CAD)   
Yes  
Date of Last Tobacco Use:   
2008  
Date of Prior MI: 2014  
Diabetes Mellitus: Yes  
Tobacco Use: Former  
Family History: Coronary   
Artery Disease  
History/Risk Factors  
COVID.  
Procedure(s):  
Complete two-dimensional,   
color flow and Doppler   
transthoracic  
echocardiogram is performed   
with contrast. Definity   
explained to patient.  
Patient verbalizes   
understanding and agrees to   
proceed. Definity  
1.3ml/8.7ml  
normal sterile saline 3 ml   
total given IV over 30-60   
seconds.  
Left Ventricle  
Left ventricular chamber   
dimension is normal. Left   
ventricular systolic  
function is low normal with an   
ejection fraction by Biplane   
Method of  
Discs of  
51 %. There is severe left   
ventricular concentric   
hypertrophy. Left  
ventricular segmental wall   
motion is normal. The left   
ventricular  
diastolic  
function is indeterminate.  
Right Ventricle  
Right ventricular chamber   
dimension is normal. Right   
ventricular  
systolic  
function is normal.  
Left Atria  
Left atrial chamber is mildly   
enlarged with a left atrial   
volume index  
of 38  
ml/m2 by BP MOD.  
Right Atria  
Right atrial chamber dimension   
is mildly enlarged.  
Aortic Valve  
Mildly thickened/calcified   
trileaflet aortic valve. There   
is no aortic  
valve  
stenosis. There is no aortic   
valve regurgitation.  
Pulmonic Valve  
The pulmonic valve is normal.   
There is no pulmonic valve   
stenosis. There  
is  
trace pulmonic regurgitation.  
Mitral Valve  
The mitral valve has normal   
leaflets. There is no mitral   
valve stenosis.  
There is trace mitral valve   
regurgitation.  
Tricuspid Valve  
The tricuspid valve leaflets   
are normal. There is no   
significant  
tricuspid  
valve stenosis. There is trace   
tricuspid valve regurgitation.   
There is  
pulmonary hypertension,   
estimated right ventricle   
systolic pressure is 43  
mmHg.  
Pericardium/Pleural  
There is no pericardial   
effusion.  
Inferior Vena Cava  
Normal inferior vena cava with   
>50% collapse upon inspiration   
consistent  
with normal right atrial   
pressure.  
Aorta  
The aortic measurements are   
indexed to age and body   
surface area. The  
aortic  
root is normal measuring 3.6   
cm with an index of 1.5 cm/m2.   
The proximal  
ascending aorta is normal   
measuring 3.4 cm with an index   
of 1.4 cm/m2.  
Left Ventricular Outflow Tract  
------------------------------  
------------------------------  
----------  
Name Value Normal  
------------------------------  
------------------------------  
----------  
LVOT 2D  
------------------------------  
------------------------------  
----------  
LVOT Diameter 2.3 cm  
LVOT Doppler  
------------------------------  
------------------------------  
----------  
LVOT Peak Velocity 0.8 m/s  
LVOT Peak Gradient 2 mmHg  
LVOT Mean Gradient 1 mmHg  
LVOT VTI 14 cm  
LVOT VTI/AV VTI Ratio 0.9  
LVOT Stroke Volume 61 ml  
LVOT Stroke Index 26.57 ml/m2  
Pulmonic Valve  
------------------------------  
------------------------------  
----------  
Name Value Normal  
------------------------------  
------------------------------  
----------  
PV 2D  
------------------------------  
------------------------------  
----------  
RVOT Diameter (2D) 2.7 cm   
1.7-2.7  
RVOT Doppler  
------------------------------  
------------------------------  
----------  
RVOT Peak Velocity 61 cm/s  
RVOT Peak Gradient 2 mmHg  
RVOT Mean Gradient 1 mmHg  
RVOT VTI 11 cm  
PV Doppler  
------------------------------  
------------------------------  
----------  
PV Peak Velocity 0.62 m/s  
PV Peak Gradient 2 mmHg  
PV Mean Gradient 1 mmHg  
PV VTI 13 cm  
PV Area (Cont Eq VTI) 4.7 cm2  
PV Area Index (Cont Eq VTI)   
1.92 cm2/m2  
PV Area (Cont Eq Adebayo) 5.6 cm2  
PV Area Index (Cont Eq Adebayo)   
2.29 cm2/m2  
PV Regurgitation Doppler  
------------------------------  
------------------------------  
----------  
NV Peak Gradient (more content   
not included)...    Normal                                  [facility]  
   
                                                    BASIC METABOLIC PANELon 08-1  
3-2024   
   
                                                    Anion gap   
[Moles/Vol]     14 mmol/L       Normal          10-20           [facility]  
   
                                                    Comment on   
above:                                  Order Comment: [facility]  
vices has implemented the   
eGFR calculation approach that does not have a coefficient for race   
that conforms to the NKF-ASN Task Force Recommendations.   
   
                                                            Performed By: #### 4  
6124 ####  
 LAB  
[address] [address]  
Darci Wakefield M.D.  
[id number]   
   
                                                    Calcium   
[Mass/Vol]      9.7 mg/dL       Normal          8.4-10.2        [facility]  
   
                                                    Comment on   
above:                                  Order Comment: [facility]augh[facility] has implemented the   
eGFR calculation approach that does not have a coefficient for race   
that conforms to the NKF-ASN Task Force Recommendations.   
   
                                                            Performed By: #### 4  
6124 ####  
 LAB  
[address] [address]  
Darci Wakefield M.D.  
[id number]   
   
                                                    Chloride   
[Moles/Vol]     103 mmol/L      Normal                    [facility]  
   
                                                    Comment on   
above:                                  Order Comment: [facility] has implemented the   
eGFR calculation approach that does not have a coefficient for race   
that conforms to the NKF-ASN Task Force Recommendations.   
   
                                                            Performed By: #### 4  
6124 ####  
 LAB  
[address] [address]  
Darci Wakefield M.D.  
[id number]   
   
                                                    Creatinine   
[Mass/Vol]      1.19 mg/dL      Normal          0.80-1.30       [facility]  
   
                                                    Comment on   
above:                                  Order Comment: [facility] Fritz vaughans has implemented the   
eGFR calculation approach that does not have a coefficient for race   
that conforms to the NKF-ASN Task Force Recommendations.   
   
                                                            Performed By: #### 4  
6124 ####  
 LAB  
[address] [address]  
Darci Wakefield M.D.  
[id number]   
   
                      EGFR       66 mL/min/1.73 m2 Normal     >=60       [facility]  
   
                                                    Comment on   
above:                                  Order Comment: [facility] Fritz vaughans has implemented the   
eGFR calculation approach that does not have a coefficient for race   
that conforms to the NKF-ASN Task Force Recommendations.   
   
                                                            Result Comment: Juliet  
mated GFR was calculated using the  CKD-EPI   
creatinine equation.   
   
                                                            Performed By: #### 4  
6124 ####  
 LAB  
[address] [address]  
Darci Wakefield M.D.  
[id number]   
   
                                                    Glucose   
[Mass/Vol]      182 mg/dL       High            65-99           [facility]  
   
                                                    Comment on   
above:                                  Order Comment: [facility] Fritz vaughans has implemented the   
eGFR calculation approach that does not have a coefficient for race   
that conforms to the NKF-ASN Task Force Recommendations.   
   
                                                            Performed By: #### 4  
6124 ####  
 LAB  
[address] [address]  
Darci Wakefield M.D.  
[id number]   
   
                                                    HCO3 (Bld)   
[Moles/Vol]     27 mmol/L       Normal          21-32           [facility]  
   
                                                    Comment on   
above:                                  Order Comment: [facility] Fritz vaughans has implemented the   
eGFR calculation approach that does not have a coefficient for race   
that conforms to the NKF-ASN Task Force Recommendations.   
   
                                                            Performed By: #### 4  
6124 ####  
 LAB  
[address] [address]  
Darci Wakefield M.D.  
[id number]   
   
                                                    Potassium   
[Moles/Vol]     4.1 mmol/L      Normal          3.5-5.1         [facility]  
   
                                                    Comment on   
above:                                  Order Comment: [facility] Fritz vaughans has implemented the   
eGFR calculation approach that does not have a coefficient for race   
that conforms to the NKF-ASN Task Force Recommendations.   
   
                                                            Performed By: #### 4  
6124 ####  
 LAB  
[address] [address]  
Darci Wakefield M.D.  
[id number]   
   
                                                    Sodium   
[Moles/Vol]     140 mmol/L      Normal          135-145         [facility]  
   
                                                    Comment on   
above:                                  Order Comment: [facility] Fritz vaughans has implemented the   
eGFR calculation approach that does not have a coefficient for race   
that conforms to the NKF-ASN Task Force Recommendations.   
   
                                                            Performed By: #### 4  
6124 ####  
[facility]  
[address] [address]  
Darci Wakefield M.D.  
[id number]   
   
                                                    Urea nitrogen   
[Mass/Vol]      12 mg/dL        Normal          8-25            [facility]  
   
                                                    Comment on   
above:                                  Order Comment: [facility] Fritz vaughans has implemented the   
eGFR calculation approach that does not have a coefficient for race   
that conforms to the NKF-ASN Task Force Recommendations.   
   
                                                            Performed By: #### 4  
9324 ####  
[facility]  
[address] [place], [place] [address]  
Darci Wakefield M.D.  
[id number]   
   
                                                    Urea   
nitrogen/Creatin  
ine [Mass ratio] 10.1 mg/mg      Normal          10.0-20.0       [facility]  
   
                                                    Comment on   
above:                                  Order Comment: [facility]  
vices has implemented the   
eGFR calculation approach that does not have a coefficient for race   
that conforms to the NKF-ASN Task Force Recommendations.   
   
                                                            Performed By: #### 4  
6124 ####  
 LAB  
[address] [id number]  
Darci Wakefield M.D.  
[id number]   
   
                                                    C URINEon 2024   
   
                                        C URINE             [facility]   
Laboratory  
Services  
[address] [place] [address]  
[phone]  
Name: NICK CLOUD MRN:   
[id number]  
: 1955 Admitting DONNA SANDOVAL DO  
Provider:  
Gender Male [facility]   
[id number]  
: Number:  
[place] 1DOU; D174; 02  
n:  
Admit 8/3/2024  
Date:  
Discharge 2024   
Microbiology  
Date:  
PROCEDURE: C URINE []   
ACCESSION: [id number]  
SOURCE: CLEAN CATCH BODY SITE:  
COLLECTED DATE/TIME: 8/3/2024   
19:20 EDT RECEIVED DATE/TIME:   
2024 09:56 EDT  
START DATE/TIME: 2024   
09:56 EDT FREE TEXT SOURCE:  
ORDERING PHYSICIAN: MARTY CHAPMAN MD  
***FINAL REPORTS***  
Final Report []  
Verified Date/Time: 2024   
08:06 EDT  
No significant growth.  
______________________________  
__  
____  
L=Low, H= High, *= Abnormal,   
C=Critical, f=Footnote,  
c=Corrected, i=Interp Data  
Name: NICK CLOUD  
MRN: [id number]  
Print 2024 08:06 EDT  
Date/Time:          Normal                                  [facility]  
   
                                                    Comment on   
above:                                  Performed By: #### [id number] ####  
[facility]  
[address] [place] [address]  
[phone]  
Medical Director: Bernard Walls MD   
   
                                                    B12 FOLATEon 2024   
   
                                                    Cobalamin   
(Vitamin B12)   
[Mass/Vol]      711 pg/mL       Normal          211-911         [facility]  
   
                                                    Comment on   
above:                                  Performed By: #### [id number] ####  
[facility]  
[address] [place] [address]  
[phone]  
Medical Director: Bernard Walls MD   
   
                      FOLATE     17.2 ng/mL Normal     5.4-17.5   [facility]  
   
                                                    Comment on   
above:                                  Result Comment: Methotrexate and leucovo  
rin interfere with folate   
measurement because these drugs cross-react with folate binding   
proteins.   
   
                                                            Performed By: #### C  
[id number] ####  
[facility]  
[address] [place] [address]  
[phone]  
Medical Director: Bernard Walls MD   
   
                                                    BASICMETAon 2024   
   
                                                    Calcium   
[Mass/Vol]      8.6 mg/dL       Low             8.7-10.4        [facility]  
   
                                                    Comment on   
above:                                  Performed By: #### [id number] ####  
[facility]  
[address] [place] [address]  
[phone]  
Medical Director: Bernard Walls MD   
   
                                                    Chloride   
[Moles/Vol]     111 mmol/L      High                      [facility]  
   
                                                    Comment on   
above:                                  Performed By: #### [id number] ####  
[facility]  
[address] [place] [address]  
[phone]  
Medical Director: Bernard Walls MD   
   
                      CO2 [Moles/Vol] 26.0 mmol/L Normal     20.0-31.0  [facility]  
   
                                                    Comment on   
above:                                  Performed By: #### [id number] ####  
[facility]  
[address] [place] [address]  
[phone]  
Medical Director: Bernard Walls MD   
   
                                                    Creatinine   
[Mass/Vol]      1.1 mg/dL       Normal          0.6-1.1         [facility]  
   
                                                    Comment on   
above:                                  Performed By: #### [id number] ####  
[facility]  
[address] [place] [address]  
[phone]  
Medical Director: Bernard Walls MD   
   
                      GFR AA     >60        Normal                [facility]  
   
                                                    Comment on   
above:                                  Result Comment:  GFR Bryce  
c  
Medical judgement is necessary to interpret GFR. The calculated GFR   
may not accurately reflect renal status in patients >70 years,   
pregnant women, acutely ill hospitalized patients and patients with   
acute renal failure or known renal disease. The MDRD GFR formula is   
valid only for adults greater than 18 years of age.  
Note:  
Creatinine clearance (not GFR) should be used for drug dosing.   
   
                                                            Performed By: #### C  
[id number] ####  
[facility]  
[address] [place], [place] [address]  
[phone]  
Medical Director: Bernard Walls MD   
   
                                                    Glomerular   
Filtration Rate >60             Normal                          [facility]  
   
                                                    Comment on   
above:                                  Result Comment: Non- GFR  
 Calc  
Medical judgement is necessary to interpret GFR. The calculated GFR   
may not accurately reflect renal status in patients >70 years,   
pregnant women, acutely ill hospitalized patients and patients with   
acute renal failure or known renal disease. The MDRD GFR formula is   
valid only for adults greater than 18 years of age.  
Note:  
Creatinine clearance (not GFR) should be used for drug dosing.   
   
                                                            Performed By: #### C  
[id number] ####  
[facility]  
[address] [place] [address]  
[phone]  
Medical Director: Bernard Walls MD   
   
                                                    Glucose   
[Mass/Vol]      150 mg/dL       High                      [facility]  
   
                                                    Comment on   
above:                                  Performed By: #### [id number] ####  
[facility]  
[address] [place] [address]  
[phone]  
Medical Director: Bernard Walls MD   
   
                                                    Osmolality   
[Osmolality]    289 mosm/kg     Normal          275-295         [facility]  
   
                                                    Comment on   
above:                                  Performed By: #### [id number] ####  
[facility]  
[address] [place] [address]  
[phone]  
Medical Director: Bernard Walls MD   
   
                                                    Potassium   
[Moles/Vol]     4.3 mmol/L      Normal          3.5-5.1         [facility]  
   
                                                    Comment on   
above:                                  Performed By: #### [id number] ####  
[facility]  
[address] [place], [place] [address]  
[phone]  
Medical Director: Bernard Walls MD   
   
                                                    Sodium   
[Moles/Vol]     143 mmol/L      Normal          135-145         [facility]  
   
                                                    Comment on   
above:                                  Performed By: #### [id number] ####  
[facility]  
[address] [place], [place] [address]  
[phone]  
Medical Director: Bernard Walls MD   
   
                                                    Urea nitrogen   
[Mass/Vol]      16 mg/dL        Normal          9-23            [facility]  
   
                                                    Comment on   
above:                                  Result Comment: - Venipuncture should oc  
cur prior to N-Acetyl   
Cysteine (NAC) or Metamizole (Sulpyrine) administration due to the   
potential for falsely depressed results.  
- Blood samples from some patients with monoclonal gammopathies may   
produce falsely elevated results   
   
                                                            Performed By: #### C  
[id number] ####  
[facility]  
[address] [place], [place] [address]  
[phone]  
Medical Director: Bernard Walls MD   
   
                                                    Urea   
nitrogen/Creatin  
ine [Mass ratio] 14.5 mg/mg      Normal                          [facility]  
   
                                                    Comment on   
above:                                  Performed By: #### [id number] ####  
[facility]  
[address] [place], [place] [address]  
[phone]  
Medical Director: Bernard Walls MD   
   
                                                    CBCNDon 2024   
   
                                                    Erythrocyte   
distribution   
width (RBC)   
[Ratio]         14.9 %          High            11.5-14.5       [facility]  
   
                                                    Comment on   
above:                                  Performed By: #### [id number] ####  
[facility]  
[address] [place] [address]  
[phone]  
Medical Director: Bernard Walls MD   
   
                                                    Hematocrit (Bld)   
[Volume   
fraction]       27.3 %          Low             41.0-52.0       [facility]  
   
                                                    Comment on   
above:                                  Performed By: #### [id number] ####  
[facility]  
[address] [place], [place] [address]  
[phone]  
Medical Director: Bernard Walls MD   
   
                                                    Hemoglobin (Bld)   
[Mass/Vol]      9.5 g/dL        Low             13.5-17.5       [facility]  
   
                                                    Comment on   
above:                                  Performed By: #### [id number] ####  
[facility]  
[address] [place], [place] [address]  
[phone]  
Medical Director: Bernard Walls MD   
   
                      Instr WBC ND 5.9        Normal                [facility]  
   
                                                    Comment on   
above:                                  Performed By: #### [id number] ####  
[facility]  
[address] [place], [place] [address]  
[phone]  
Medical Director: Bernard Walls MD   
   
                                                    MCH (RBC)   
[Entitic mass]  36.9 pg         High            27.0-34.0       [facility]  
   
                                                    Comment on   
above:                                  Performed By: #### [id number] ####  
[facility]  
[address] [place], [place] [address]  
[phone]  
Medical Director: Bernard Walls MD   
   
                                                    MCHC (RBC)   
[Mass/Vol]      34.7 g/dL       Normal          32.0-37.0       [facility]  
   
                                                    Comment on   
above:                                  Performed By: #### [id number] ####  
[facility]  
[address] [place], [place] [address]  
[phone]  
Medical Director: Bernard Walls MD   
   
                                                    MCV (RBC)   
[Entitic vol]   106.2 fL        High            80.0-100.0      [facility]  
   
                                                    Comment on   
above:                                  Performed By: #### [id number] ####  
[facility]  
[address] [place], [place] [address]  
[phone]  
Medical Director: Bernard Walls MD   
   
                      Platelet   126 x10    Low        150-450    [facility]  
   
                                                    Comment on   
above:                                  Performed By: #### [id number] ####  
[facility]  
[facility] [place] [address]  
[phone]  
Medical Director: Bernard Walls MD   
   
                                                    Platelet mean   
volume (Bld)   
[Entitic vol]   7.8 fL          Normal          7.4-10.4        [facility]  
   
                                                    Comment on   
above:                                  Performed By: #### [id number] ####  
[facility]  
[address] [place] [address]  
[phone]  
Medical Director: Bernard Walls MD   
   
                      RBC        2.57 x10   Low        4.70-6.10  [facility]  
   
                                                    Comment on   
above:                                  Result Comment: Note: RBC morphology is   
normal unless otherwise   
stated. Evaluation performed only if differential is requested.   
   
                                                            Performed By: #### C  
[id number] ####  
[facility]  
[address] [place] [address]  
[phone]  
Medical Director: Bernard Walls MD   
   
                      WBC        5.9 x10    Normal     4.5-11.0   [facility]  
   
                                                    Comment on   
above:                                  Performed By: #### [id number] ####  
[facility]  
[address] [place] [address]  
[phone]  
Medical Director: Bernard Walls MD   
   
                                                    [facility] 2024   
   
                      Cortisol - AM 9.38 ug/dl Normal     5.27-22.45 [facility]  
   
                                                    Comment on   
above:                                  Result Comment: - Circulating cortisol r  
esults from patients   
receiving prednisolone or prednisone may be falsely elevated.  
- Patients receiving metyrapone may show artificial elevated cortisol   
values   
   
                                                            Performed By: #### 1  
[id number], [id number], [id number] ####[facility]   
Laboratory [id number] [place] [phone] Director: Bernard Walls MD   
   
                                                    COVID-19 Molecular SWEDon    
   
                                                    SARS-CoV-2   
(COVID-19) RNA   
GEORGIE+probe Ql   
(Unsp spec)     Negative        Normal          Negative        [facility]  
   
                                                    Comment on   
above:                                  Result Comment: This assay is designed t  
o detect the RdRp target of   
SARS-CoV-2 using rapid molecular isothermal amplification technology.   
A Negative result does not preclude the possibility of COVID 19   
infection since the adequacy of sample collection and/or low viral   
burden may result in the presence of viral nucleic acids below the   
analytical sensitivity of this test method. Test results should be   
used along with other clinical and laboratory data in making the   
diagnosis.  
This testing was performed in the [facility]   
laboratory located at [[address] [address]]   
   
                                                            Performed By: #### 3  
[id number] ####  
[facility]  
[address] [place] [address]  
[phone]  
Medical Director: Bernard Walls MD   
   
                                                    HGB A1Con 2024   
   
                                                    HbA1c (Bld)   
[Mass fraction] 5.3 %           Normal                          [facility]  
   
                                                    Comment on   
above:                                  Result Comment: Reference Range:  
Diabetic Greater than or equal to 6.5 %  
Prediabetic 5.7?6.4 %  
Normal Less than 5.7 %   
   
                                                            Performed By: #### 1  
[id number] ####[facility]   
[id number] [place] [phone] Director: Bernard Walls MD   
   
                                                    Inpatient Patient Summaryon   
2024   
   
                                                    Inpatient   
Patient Summary                         [facility]  
Discharge Instructions  
[address] [place], [place] [address]  
Phone [phone]  
(Patient Copy)  
  
  
Name: NICK CLOUD :   
1955  
Diagnosis: 1:Near syncope;   
2:Hypotension; 3:Acute kidney   
injury  
  
Allergies: No Known Medication   
Allergies  
  
Registration Date: 24  
MRN: [id number]  
FIN#: [id number] Current   
Date Time: 2024 12:29:23  
  
Address: [address] [place] [address] [address] [place] [address]  
Phone: [phone]  
  
Primary Care Provider:  
Name: JASON BONILLA  
Phone: [phone]  
  
Thank you for choosing   
[facility] for your   
care. You are very important   
to us. Our goal is to   
demonstrate our high quality   
medical care and provide you   
with a very good patient   
experience.  
You may receive a survey about   
our service. Please take the   
time to complete the survey   
and return it so we can   
continue to enhance our   
service.  
Thank you again for allowing   
[facility] to care for   
your medical needs. If you   
have any questions about your   
care or follow up information   
please contact your doctor.  
Follow-up Instructions  
The following Appointments   
have been made for you:  
Please Note: the first letters   
listed in the order is the   
location code, followed by the   
appointment date, and   
scheduled provider  
Future Appointments  
No Future Appointments   
Scheduled  
Provider Follow Ups:  
With: Address: When:  
JASON BONILLA [address] NOT ON   
[address] [place]   
[place] [address]  
[phone] Business (1) Within   
Call for Appointment  
Comments:  
Call for an appointment in 5-7   
days  
  
If you have had an intravenous   
catheter (IV) during your   
stay, keep the dressing dry   
and do not remove it from the   
site for at least 24 hours or   
as instructed by your provider   
to prevent problems.  
Medication Information  
Only Take The Medicines On   
This List.  
***Keep This List and Bring It   
To Your Next Appointment.***  
Medicines To Take At Home:  
Medicine Name  
(Generic Name) Amount to Take   
How to Take it How Often to   
Take it Additional   
Instructions Next Dose Due  
aspirin 81 mg oral delayed   
release tablet  
(aspirin) 81 mg By Mouth DAILY  
Lipitor 40 mg oral tablet  
(atorvastatin) 40 mg By Mouth   
AT BEDTIME  
dilTIAZem 120 mg oral tablet  
(diltiazem) 120 mg By Mouth   
DAILY WITH LUNCH  
NexIUM 40 mg oral delayed   
release capsule  
(esomeprazole) 40 mg By Mouth   
DAILY  
Lasix 40 mg oral tablet  
(furosemide) 40 mg By Mouth   
DAILY  
Zestril 20 mg oral tablet  
(lisinopril) 20 mg By Mouth   
DAILY  
metFORMIN 500 mg oral tablet *  
(metformin = glucophage) 500   
mg By Mouth TWICE A DAY WITH   
MEALS Resume 8/6 at dinner   
time  
  
Lopressor 50 mg oral tablet  
(metoprolol) 50 mg By Mouth   
EVERY TWELVE HOURS  
Nitrostat 0.4 mg sublingual   
tablet  
(nitroglycerin) 0.4 mg   
Sublingual EVERY FIVE MINUTES   
Take as needed for chest pain  
  
Potassium Chloride   
(Eqv-Klor-Con M20) 20 mEq oral   
tablet, extended release  
(potassium chloride (kcl)) 20   
mEq By Mouth DAILY  
Xarelto 20 mg oral tablet  
(rivaroxaban) 20 mg By Mouth   
EVERY EVENING To prevent or   
treat blood clots  
  
Understanding your home   
medicine is important to   
keeping you healthy. If you   
are taking medications that   
are not on the preceding list,   
please call your doctor to see   
if you are to continue taking   
that medication. It is   
important that you do not skip   
or make up doses. If you are   
ordered an antibiotic, finish   
taking all the medicine unless   
your doctor tells you   
otherwise. Call your doctor if   
you have any questions or   
problems. Take the medicine   
list with you to all follow up   
appointments.  
Patient education materials,   
if any, will display below  
Fainting: Care Instructions  
Your Care Instructions  
When you faint, or pass out,   
you lose consciousness for a   
short time. A brief drop in   
blood flow to the brain often   
causes it. When you fall or   
lie down, more blood flows to   
your brain and you regain   
consciousness.  
Emotional stress, pain, or   
overheatingespecially if you   
have been standingcan make you   
faint. In these cases,   
fainting is usually not   
serious. But fainting can be a   
sign of a more serious   
problem. Your doctor may want   
you to have more tests to rule   
out other causes.  
The treatment you need depends   
on the reason why you fainted.  
The doctor has checked you   
carefully, but problems can   
develop later. If you notice   
any problems or new symptoms,   
get medical treatment right   
away.  
Follow-up care is a key part   
of your treatment and safety.   
Be sure to make and go to all   
appointments, and call your   
doctor if you are having   
problems. It's also a good   
idea to know your test results   
and keep a list of the   
medicines you take.  
How can you care for yourself   
at home?  
? Drink plenty of fluids to   
prevent dehydration. If you   
have kidney, heart, or liver   
disease and have to limit   
fluids, talk with your doctor   
before you increase your fluid   
intake.  
When should you call for help?  
Call 911 anytime you think you   
may need emergency care. For   
example, call if:  
? You have symptoms of a heart   
problem. These may include:  
? C (more content not   
included)...        Normal                                  [facility]  
   
                                                    PCTon 2024   
   
                      Procalcitonin <0.05      Normal                [facility]  
   
                                                    Comment on   
above:                                  Result Comment: INTER DATA  
______________________  
Less than or = 0.5 ng/mL  
Systemic infection (sepsis) is not likely. Local bacterial infection   
is possible. If PCT is measured very early after a bacterial   
challenge (usually less than 6 hours), these values may still be low.   
In this case, PCT should be re-assessed 6-24 hrs later.  
Greater than 0.5 - less than 2.0 ng/mL  
Systemic infection (sepsis) is possible, but other conditions are   
known to elevate PCT as well. The patient should be closely monitored   
both clinically and by re-assessing PCT within 6-24 hrs.  
Greater than or = 2.0 - less than 10.0 ng/mL Critical Range  
Systemic infection (sepsis) is likely, unless other causes are known.   
High risk for progression to severe systemic infection (severe   
sepsis/septic shock).  
Greater than or = 10.0 ng/mL Critical Range  
Important systemic inflammatory response, almost exclusively due to   
severe bacterial sepsis or septic shock. High likelihood of severe   
sepsis or septic shock.   
   
                                                            Performed By: #### 3  
[id number] ####  
[facility]  
[address] [id number] [phone]  
Medical Director: Bernard Walls MD   
   
                                                    Pharmacy Clinical Interventi  
ons-Texton 2024   
   
                                                    Pharmacy   
Clinical   
Interventions-Te  
xt                                      Pharmacy Clinical   
Interventions Entered On:   
2024 12:17 EDT  
Performed On: 2024 12:16   
EDT by Сергей Lux RPh  
  
  
  
  
Interventions  
Intervention Type Pharmacy :   
Discharge Med Rec Review  
Pharmacy Order Initiated By :   
Pharmacist  
Clinical Importance Pharmacy :   
Potentially minor  
Prescriber Response Pharmacy :   
Corrected prior to contact  
Patient Clinical Outcome   
Pharmacy : Avoided potential   
risk  
Pharmacist Intervention Time :   
10  
Pharmacy Additional   
Information :  mj profile   
reviewed  
dc home  
Near syncope...JOCELYNE  
no PNA, no DVT  
carotid stenosis.....on asa   
and lipitor 40mg from home  
ready from pharmacy   
perspective  
  
Сергей Lux RPh - 2024   
12:16 EDT           Normal                                  [facility]  
   
                                                    RAP FLU A AND Bon 2024  
   
   
                                                    Rapid Influenza   
A Antigen Test  Negative        Normal                          [facility]  
   
                                                    Comment on   
above:                                  Result Comment: A positive Rapid Influen  
za A Antigen Test indicates   
the presence of Influenza A. This is a screening test only and should   
be correlated with the patient's clinical symptoms.   
   
                                                            Performed By: #### 5  
[id number] ####[facility]   
[id number] [place] [phone] Director: Bernard Walls MD   
   
                                                    Rapid Influenza   
B Antigen Test  Negative        Normal                          [facility]  
   
                                                    Comment on   
above:                                  Result Comment: A positive Rapid Influen  
za B Antigen Test indicates   
the presence of Influenza B. This is a screening test only and should   
be correlated with the patients's clinical symptoms.   
   
                                                            Performed By: #### 5  
[id number] ####[facility]   
[id number] [place] [phone] Director: Bernard Walls MD   
   
                      RFAB INT QC Present    [facility]  
   
                                                    Comment on   
above:                                  Performed By: #### [id number] ####[facility]   
[id number] [place] [phone] Director: Bernard Walls MD   
   
                                                    SDOH Screening Assessment -   
Texton 2024   
   
                                                    SDOH Screening   
Assessment -   
Text                                    Social Determinants of Health   
Screening Assessment Entered   
On: 2024 11:23 EDT  
Performed On: 2024 11:22   
EDT by Misty Freedman RN  
  
  
  
  
Social Determinants of Health   
(SDOH) - screen  
1. Is the patient agreeable to   
providing responses to the   
SDOH Screening Assessment? :   
Yes  
Responses obtained from :   
Patient  
Misty Freedman RN - 2024   
11:22 EDT  
Social Determinants of Health   
(SDOH) - assessment  
2. What is your living   
situation? : I have a steady   
place to live  
3. Within the past 12 months,   
the food you bought just   
didn't last and you didn't   
have money to get more? : No  
4. Within the past 12 months,   
you worried that your food   
would run out before you got   
money to buy more? : No  
5. In the past 12 months has,   
the electric, gas, oil, or   
water company threatened to   
shut off services to your   
home? : No  
6. In the past 12 months, has   
lack of reliable   
transportation kept you from   
medical appointments? : No  
7. In the past 12 months, has   
lack of reliable   
transportation kept you from   
meetings, work, or getting   
things needed for daily   
living? : No  
8. How hard is it for you to   
pay for your prescriptions? :   
Not hard at all  
9. If for any reason you need   
help with day-to-day   
activities such as bathing,   
preparing meals, shopping,   
managing finances, etc., do   
you get the help you need? :   
Don't need help  
Is the patient an Ambulatory   
patient type? : No  
Does the patient agree with   
the above consent? : Yes  
Misty Freedman RN - 2024   
11:22 EDT           [facility]  
   
                                                    Social Work/Care Mgmt Assess  
ment-Todd 2024   
   
                                                    Social Work/Care   
Mgmt   
Assessment-Text                         Social Work/Case Management   
Assessment Entered On:   
2024 11:28 EDT  
Performed On: 2024 11:23   
EDT by Misty Freedman RN, CM/SW Assessment  
Reason for Referral :   
Discharge Planning  
Primary Care Physician :   
JASON BONILLA  
Working Diagnosis from   
Physician Note : hypotension  
JOCELYNE  
syncope  
Advanced Directive : Pt has   
Advance Directive and has been   
asked to provide a copy  
Social Determinants of Health   
: None Identified  
Patient Goal(s) at Discharge :   
to return home  
Anticipated Discharge Date :   
2024 EDT  
Anticipate Home Health Care :   
No  
Misty Freedman RN - 2024   
11:23 EDT  
Progress Note  
Ensure your note includes the   
following: :  
A. Discharge Plan:  
B. DME required at discharge:  
C. Who the discharge plan has   
been discussed with:  
D. Transportation needed at   
discharge:  
E. Barriers to discharge:  
F. Handoff to whom:  
  
Progress Note : CM met with pt   
and wife in room.   
Alert/oriented-resting in bed.   
Consent received to speak to   
wife. 8/3/24 Admit observation   
LOC for episode of dizziness,   
nausea, vomiting and   
diaphoresis. History includes   
diabetes, hypertension, atrial   
fibrillation. Cl 110. Cr 1.3.   
D-dimer 552. Pt lives in 1   
level home with his wife. DME   
none. Independent in   
ADLS-drives. PCP Dr Bonilla,   
sees 2x a year. Advance   
Directives completed. Pt does   
not anticipate any homegoing   
needs. SDOH completed, no   
needs. CM will continue to   
follow.  
A. Discharge Plan: home, no   
needs  
B. DME required at discharge:   
none  
C. Who the discharge plan has   
been discussed with: pt and   
wife  
D. Transportation needed at   
discharge: no  
E. Barriers to discharge:   
medical clearance  
F. Handoff to whom: N/A  
  
Discharge Disposition : Home  
Impression needs have been   
identified / Documentation   
completed: : Social Work/Care   
Management Assessment   
Completed  
Misty Freedman RN - 2024   
11:23 EDT           Normal                                  [facility]  
   
                                                    TROPONIN HS 6HRon 2024  
   
   
                                                    Delta Troponin 6   
Hr              1 pg/mL         Normal          0-14            [facility]  
   
                                                    Comment on   
above:                                  Result Comment: The term acute myocardia  
l infarction should be used   
when there is acute myocardial injury with clinical evidence of acute   
myocardial ischemia and the rise or fall of serial Troponin HS values   
(delta troponin) greater than or equal to 15 pg/mL with at least one   
Troponin HS value above the 99th percentile reference range   
   
                                                            Performed By: #### C  
[id number] ####  
[facility]  
[address] [place] [address]  
[phone]  
Medical Director: Bernard Walls MD   
   
                      Troponin HS 6 Hr 5 pg/mL    Normal     3-53       [facility]  
   
                                                    Comment on   
above:                                  Result Comment: Specimens from some colt  
viduals with pathologically   
high gamma globulin levels may demonstrate depressed troponin values   
   
                                                            Performed By: #### C  
[id number] ####  
[facility]  
[address] [place] [address]  
[phone]  
Medical Director: Bernard Walls MD   
   
                                                    TSHon 2024   
   
                      TSH Qn     1.43 m[IU]/L Normal     0.55-4.78  [facility]  
   
                                                    Comment on   
above:                                  Result Comment: - Do not use samples herman  
t contain fluorescein.   
Fluorescein levels > 0.24 ?g/mL may decrease results in this assay  
- Patients undergoing retinal fluorescein angiography can retain   
amounts of fluorescein in the body for up to 48?72 hours   
post-treatment. Such samples can produce falsely depressed values   
when tested with this assay, and should not be tested  
Pregnancy Reference Intervals (if applicable):  
First trimester: 0.6-3.4 uIU/mL  
Second trimester: 0.37-3.6 uIU/mL  
Third trimester: 0.38-4.04 uIU/mL  
Reference: [email or web address] (10/2023)   
   
                                                            Performed By: #### 1  
[id number], [id number], [id number] ####[facility]   
Laboratory [id number] [place], [place] [phone] Director: Bernard Walls MD   
   
                                                    VIT D 25 LEVELon 2024   
   
                      Vit D 25   89 ng/mL   Normal                [facility]  
   
                                                    Comment on   
above:                                  Result Comment: Less than 20 ng/mL Defic  
ient  
20 ? 30 ng/mL Insufficient  
30 ? 100 ng/mL Sufficiency  
Greater than 100 ng/mL Potential Toxicity   
   
                                                            Performed By: #### C  
[id number] ####  
[facility]  
[address] [place] [address] [phone]  
Medical Director: Bernard Walls MD   
   
                                                    Vascular Lab Reporton 2024   
   
                                                    Vascular Lab   
Report                                  VASCULAR LAB PRELIMINARY   
RESULTS:  
  
Bilateral carotid Duplex is   
completed. There is mild   
plaque in the bilateral   
internal carotid arteries. MD   
report to follow.  
  
Electronically Signed by:  
Nadja Jin RVT  
on 2024 11:22 EDT Normal                                  [facility]  
   
                                                    ALCOHOLon 2024   
   
                                                    Ethanol   
[Mass/Vol]      mg/dL           Normal                          [facility]  
   
                                                    Comment on   
above:                                  Result Comment: Note: Alcohol values per  
formed at [facility] are performed   
on serum or plasma and reported in mg/dl, which is different then the   
state reporting units of g/dl which is performed on whole blood.   
Result reporting units are based on test methodology and are not   
interchangable.   
   
                                                            Performed By: #### 1  
[id number], [id number], 855335, [id number], 038840,   
826784, 903252, [id number] ####[facility]   
[address] [phone] Director: Bernard Walls MD   
   
                                                    APTTon 2024   
   
                                                    aPTT Coag (Bld)   
[Time]          30.1 s          Normal          27.0-38.0       [facility]  
   
                                                    Comment on   
above:                                  Result Comment: APTT Interpretation:  
This test has not been validated to monitor heparin therapy. APTT   
test is used as an initial test for suspected bleeding disorder.   
Anti-Xa UFH test is used to monitor heparin therapy.   
   
                                                            Performed By: #### 1  
[id number], [id number], 300109, [id number], 673151,   
432141, 315237, [id number] ####  
[facility]  
[address] [place] [address] [phone]  
Medical Director: Bernard Walls MD   
   
                                                    AUTO DIFFon 2024   
   
                      Baso Count 0.05 x1000 Normal     0.00-0.20  [facility]  
   
                                                    Comment on   
above:                                  Performed By: #### 968753, 8177465, 1169  
85, [id number], 686800,   
408120, 993056, CD:[id number] ####  
[facility]  
[address] [place] [address]  
[phone]  
Medical Director: Bernard Walls MD   
   
                      Basos %    0.6 %      Normal                [facility]  
   
                                                    Comment on   
above:                                  Performed By: #### 334771, 7446723, 1169  
85, CD:[id number], 492084,   
111272, 889550, CD:[id number] ####  
[facility]  
[address] [place] [address]  
[phone]  
Medical Director: Bernard Walls MD   
   
                      Eos Count  0.08 x1000 Normal     0.00-0.50  [facility]  
   
                                                    Comment on   
above:                                  Performed By: #### 952566, 7998263, 1169  
85, CD:[id number], 909433,   
103678, 897323, CD:[id number] ####  
[facility]  
[address] [place], [place] [address]  
[phone]  
Medical Director: Bernard Walls MD   
   
                                                    Eosinophils/100   
WBC (Bld)       1.0 %           Normal                          [facility]  
   
                                                    Comment on   
above:                                  Performed By: #### 279005, 7721535, 1169  
85, CD:[id number], 678582,   
936680, 933307, CD:[id number] ####  
[facility]  
[address] [place], [place] [address]  
[phone]  
Medical Director: Bernard Walls MD   
   
                      Lymph Count 0.68 x1000 Low        1.20-4.80  [facility]  
   
                                                    Comment on   
above:                                  Performed By: #### 346851, 5203143, 1169  
85, CD:[id number], 948364,   
029947, 773609, CD:[id number] ####  
[facility]  
[address] [place] [address]  
[phone]  
Medical Director: Bernard Walls MD   
   
                                                    Lymphocytes/100   
WBC (Bld)       8.9 %           Normal                          [facility]  
   
                                                    Comment on   
above:                                  Performed By: #### 034558, 5920807, 1169  
85, CD:[id number], 819572,   
515835, 186448, CD:[id number] ####  
[facility]  
[address] [place] [address]  
[phone]  
Medical Director: Bernard Walls MD   
   
                      Mono Count 0.62 x1000 Normal     0.10-1.00  [facility]  
   
                                                    Comment on   
above:                                  Performed By: #### 783740, 3200657, 1169  
85, CD:[id number], 998355,   
830172, 640599, CD:[id number] ####  
[facility]  
[address] [place] [address]  
[phone]  
Medical Director: Bernard Walls MD   
   
                                                    Monocytes/100   
WBC (Bld)       8.1 %           Normal                          [facility]  
   
                                                    Comment on   
above:                                  Performed By: #### 388512, 5312944, 1169  
85, CD:[id number], 996387,   
272971, 559798, CD:[id number] ####  
[facility]  
[address] [place] [address]  
[phone]  
Medical Director: Bernard Walls MD   
   
                                                    Neutrophil Count   
(ANC)           6.22 x1000      Normal          1.40-8.80       [facility]  
   
                                                    Comment on   
above:                                  Performed By: #### 332506, 3939541, 1169  
85, CD:[id number], 693273,   
051359, 042166, CD:[id number] ####  
[facility]  
[address] [place] [address]  
[phone]  
Medical Director: Bernard Walls MD   
   
                                                    Neutrophils/100   
WBC (Bld)       81.3 %          Normal                          [facility]  
   
                                                    Comment on   
above:                                  Performed By: #### 377056, 0864530, 1169  
85, CD:[id number], 010901,   
892835, 070755, CD:[id number] ####  
[facility]  
[address] [place] [address]  
[phone]  
Medical Director: Bernard Walls MD   
   
                                                    Basic Admission Informationo  
n 2024   
   
                                                    Basic Admission   
Information                             Basic Admission Information   
Entered On: 2024 21:41   
EDT  
Performed On: 2024 21:40   
EDT by Nancie King  
  
  
  
  
Admission Height/Weight  
Height/Length Measured :   
175.26 cm(Converted to: 5.75   
ft, 69.00 in)  
Height/Length Dosing : 175.26   
cm(Converted to: 5.75 ft,   
69.00 in)  
Weight Measured : 120.1   
kg(Converted to: 264 lb 12 oz,   
264.775 lb)  
Weight Dosing : 120.1   
kg(Converted to: 4,236.403 oz,   
264.775 lb)  
BSA Measured : 2.42  
BSA Dosing : 2.42  
Body Mass Index Measured :   
39.1 kg/m2  
Body Mass Index Dosing : 39  
Weight Measured Type of Scale   
: Bed Scale (digital)  
Last Documented Height/Length   
: Height/Length Dosin.26   
cm 24 16:30:00  
Height/Length Estimated: No   
results available.  
Height/Length Measured: No   
results available.  
  
Last Documented Weight and   
Type of Scale Used : Weight   
Measured Type of Scale: Bed   
Scale (digital) 24   
16:30:00  
Weight Dosin.1 kg   
24 16:30:00  
Weight Measured: No results   
available.  
  
Nancie King - 2024   
21:40 EDT  
Belongings  
Valuables/Belongings Grid  
Valuables Sent Home  
Clothes : Pants, Shirt, Shoes,   
Undergarments  
Electronic Devices : Cell   
phone  
Nancie King - 2024   
21:40 EDT  
Room Orientation/Facility   
Policy Reviewed : Yes  
Room Orientation/Policy   
Reviewed With : Patient  
Patient Safety : Bed / Chair   
Alarm, Bed in low position,   
Call device within reach, Fall   
Sticker on ID Band, Falling   
Leaf, ID band check, Mobility   
support items readily   
available, Night light,   
Non-Slip footwear, Personal   
items within reach, Sensory   
aids within reach,   
Upper/Half-length side-rails   
up, Traffic path in room free   
of clutter, Wheels locked  
Demonstrates Ability to Use   
Call Light Successfully : Yes  
Nancie King - 2024   
21:40 EDT           Normal                                  [facility]  
   
                                                    Comment on   
above:                                  Order Comment: Order entered secondary t  
o admission   
   
                                                    COMPMETAon 2024   
   
                                                    Albumin   
[Mass/Vol]      3.4 g/dL        Normal          3.4-5.0         [facility]  
   
                                                    Comment on   
above:                                  Performed By: #### 577057, 6892429, 1169  
85, CD:[id number], 176006,   
643462, 679447, CD:[id number] ####[facility]   
[id number] [place] [address](440)   
[phone] Director: Bernard Walls MD   
   
                                                    Albumin/Globulin   
[Mass ratio]    1.4 {ratio}     Normal                          [facility]  
   
                                                    Comment on   
above:                                  Performed By: #### 702727, 4645377, 1169  
85, CD:[id number], 548486,   
359223, 980160, CD:[id number] ####[facility]   
[address] [place] [address](440)   
[phone] Director: Bernard Walls MD   
   
                      Alk Phos   54 unit/L  Normal          [facility]  
   
                                                    Comment on   
above:                                  Performed By: #### 675972, 2629160, 1169  
85, CD:[id number], 556842,   
022869, 906612, CD:[id number] ####[facility]   
[address] [place] [address](440)   
[phone] Director: Bernard Walls MD   
   
                                                    Bilirubin   
[Mass/Vol]      1.10 mg/dL      Normal          0.30-1.20       [facility]  
   
                                                    Comment on   
above:                                  Result Comment: Use of this assay is not  
 recommended for patients   
undergoing treatment with eltrombopag due to the potential for   
falsely elevated results.   
   
                                                            Performed By: #### 1  
44400, 6755650, 501299, CD:[id number], 929963,   
803495, 251851, CD:[id number] ####[facility]   
[id number] [place] [address](440)   
[phone] Director: Bernard Walls MD   
   
                                                    Calcium   
[Mass/Vol]      9.0 mg/dL       Normal          8.7-10.4        [facility]  
   
                                                    Comment on   
above:                                  Performed By: #### 585587, 5305619, 1169  
85, CD:[id number], 329450,   
726558, 248684, CD:[id number] ####[facility]   
[address] [place] [phone] Director: Bernard Walls MD   
   
                                                    Chloride   
[Moles/Vol]     110 mmol/L      High                      [facility]  
   
                                                    Comment on   
above:                                  Performed By: #### 865701, 5769313, 1169  
85, CD:[id number], 262622,   
968509, 838906, CD:[id number] ####[facility]   
[address] [place], [place] [address]) [phone] Director: Bernard Walls MD   
   
                      CO2 [Moles/Vol] 25.0 mmol/L Normal     20.0-31.0  [facility]  
   
                                                    Comment on   
above:                                  Performed By: #### 534972, 1959012, 1161 46, CD:[id number], 215179,   
916621, 036975, CD:[id number] ####[facility]   
[address] [place], [place] [address]) [phone] Director: Bernard Walls MD   
   
                                                    Creatinine   
[Mass/Vol]      1.3 mg/dL       High            0.6-1.1         [facility]  
   
                                                    Comment on   
above:                                  Performed By: #### 800952, 8797848, 1160 09, CD:084177649, 175106,   
169203, 453768, CD:[id number] ####[facility]   
[address] [place], [place] [address]) [phone] Director: Bernard Walls MD   
   
                      GFR AA     >60        Normal                [facility]  
   
                                                    Comment on   
above:                                  Result Comment:  GFR Bryce  
c  
Medical judgement is necessary to interpret GFR. The calculated GFR   
may not accurately reflect renal status in patients >70 years,   
pregnant women, acutely ill hospitalized patients and patients with   
acute renal failure or known renal disease. The MDRD GFR formula is   
valid only for adults greater than 18 years of age.  
Note:  
Creatinine clearance (not GFR) should be used for drug dosing.   
   
                                                            Performed By: #### 1  
36368, 2149007, 284242, CD:486404379, 094160,   
870609, 345296, CD:[id number] ####[facility]   
[address] [place], [place] [address]) [phone] Director: Bernard Walls MD   
   
                                                    Globulin (S)   
[Mass/Vol]      2.4 g/dL        Normal                          [facility]  
   
                                                    Comment on   
above:                                  Performed By: #### 386502, 0226854, 1169  
85, CD:166433014, 505618,   
051498, 583116, CD:[id number] ####[facility]   
[address] [place], [place] [id number]) [phone] Director: Bernard Walls MD   
   
                                                    Glomerular   
Filtration Rate 55 mL/min/1.73m? Normal                          [facility]  
   
                                                    Comment on   
above:                                  Result Comment: Non- GFR  
 Calc  
Medical judgement is necessary to interpret GFR. The calculated GFR   
may not accurately reflect renal status in patients >70 years,   
pregnant women, acutely ill hospitalized patients and patients with   
acute renal failure or known renal disease. The MDRD GFR formula is   
valid only for adults greater than 18 years of age.  
Note:  
Creatinine clearance (not GFR) should be used for drug dosing.   
   
                                                            Performed By: #### 1  
68544, 7684518, 120290, CD:024585931, 767012,   
649715, 823229, CD:[id number] ####[facility]   
[address] [place], [place] [address]) [phone] Director: Bernard Walls MD   
   
                                                    Glucose   
[Mass/Vol]      176 mg/dL       High                      [facility]  
   
                                                    Comment on   
above:                                  Performed By: #### 233737, 7940742, 1169  
85, CD:766427310, 154178,   
309110, 152402, CD:[id number] ####[facility]   
[id number] [place], [place] [address]) [phone] Director: Bernard Walls MD   
   
                      GOT        20 unit/L  Normal     15-37      [facility]  
   
                                                    Comment on   
above:                                  Performed By: #### 174465, 2128667, 1169  
85, CD:774093944, 764917,   
530459, 087271, CD:[id number] ####[facility]   
[id number] [place], [place] [id number]) [phone] Director: Bernard Walls MD   
   
                      GPT        24 unit/L  Normal     10-49      [facility]  
   
                                                    Comment on   
above:                                  Performed By: #### 928508, 2585073, 1165 66, CD:858024879, 252128,   
271884, 032246, CD:[id number] ####[facility]   
[address] [place], [place] [address]) [phone] Director: Bernard Walls MD   
   
                                                    Osmolality   
[Osmolality]    292 mosm/kg     Normal          275-295         [facility]  
   
                                                    Comment on   
above:                                  Performed By: #### 970616, 0508552, 1168 37, CD:389172097, 975790,   
528046, 237069, CD:[id number] ####[facility]   
[address] [place], [place] [address]) [phone] Director: Bernard Walls MD   
   
                                                    Potassium   
[Moles/Vol]     4.2 mmol/L      Normal          3.5-5.1         [facility]  
   
                                                    Comment on   
above:                                  Result Comment: Specimen slightly hemoly  
zed. Results may be affected.   
   
                                                            Performed By: #### 1  
87507, 7405053, 116807, CD:800466801, 238886,   
256574, 242086, CD:[id number] ####[facility]   
[address] [place] [address](748) [phone] Director: Bernard Walls MD   
   
                                                    Protein   
[Mass/Vol]      5.8 g/dL        Normal          5.7-8.2         [facility]  
   
                                                    Comment on   
above:                                  Result Comment: Total Protein results ma  
y be increased in patients   
receiving dextran as a blood volume expander   
   
                                                            Performed By: #### 1  
25368, 6291176, 803653, CD:788422237, 745712,   
300605, 009464, CD:[id number] ####[facility]   
[id number] [place], [place] [address]) [phone] Director: Bernard Walls MD   
   
                                                    Sodium   
[Moles/Vol]     143 mmol/L      Normal          135-145         [facility]  
   
                                                    Comment on   
above:                                  Performed By: #### 305065, 4411772, 1169  
85, CD:470905696, 283511,   
824854, 665584, CD:[id number] ####[facility]   
[address] [place], [place] [address]) [phone] Director: Bernard Walls MD   
   
                                                    Urea nitrogen   
[Mass/Vol]      20 mg/dL        Normal          9-23            [facility]  
   
                                                    Comment on   
above:                                  Result Comment: - Venipuncture should oc  
cur prior to N-Acetyl   
Cysteine (NAC) or Metamizole (Sulpyrine) administration due to the   
potential for falsely depressed results.  
- Blood samples from some patients with monoclonal gammopathies may   
produce falsely elevated results   
   
                                                            Performed By: #### 1  
55142, 1588997, 572560, CD:319444482, 751864,   
087018, 584649, CD:[id number] ####[facility]   
[address] [place] [phone] Director: Bernard Walls MD   
   
                                                    Urea   
nitrogen/Creatin  
ine [Mass ratio] 15.4 mg/mg      Normal                          [facility]  
   
                                                    Comment on   
above:                                  Performed By: #### 508821, 0414976, 1169  
85, CD:230726503, 427744,   
998413, 519764, CD:[id number] ####[facility]   
[address] [place] [address]([phone] Director: Bernard Walls MD   
   
                                                    CT ANGIO CHESTon 2024   
   
                                        CT ANGIO CHEST      EXAM:  
CT chest angiogram with   
contrast.  
INDICATION:  
Chest pain.  
TECHNIQUE:  
Contiguous axial CT images of   
the chest.  
Intravenous contrast: Present.  
Protocol: Pulmonary embolus   
(PE) protocol angiogram.  
Reformats: MIPs and MPRs   
created and utilized.  
DLP 1148 mGy-cm.  
This exam was performed   
according to our departmental   
dose-optimization program,   
which includes automated   
exposure control, adjustment   
of the mA and/or kV according   
to patient size and/or use of   
iterative reconstruction   
technique.  
Note:  
LV=left ventricle.  
RV=right ventricle.  
COMPARISON:  
None.  
FINDINGS:  
Upper abdomen: Partially   
imaged. Cholelithiasis. Right   
renal cysts.  
Thoracic aorta: Mild   
atherosclerotic   
calcifications.  
Heart: No right atrial   
thrombus. Atherosclerotic   
calcifications of the coronary   
arteries.  
RV/LV ratio: Within normal   
limits.  
Pulmonary arteries:  
Technical: Adequate   
opacification to the level of   
the segmental vessels.  
Pulmonary embolus: No   
low-density filling defect to   
suggest acute PE.  
Overall embolic burden: None.  
Mediastinum: No pathologic   
sized middle mediastinal   
lymphadenopathy.  
Tracheobronchial tree:   
Unremarkable.  
Lungs:  
Lobar consolidation: Patchy   
airspace opacities in the left   
lower lobe.  
Pleural effusion: Negative.  
Pneumothorax: Negative.  
Other: Negative.  
Bones: Multilevel spondylosis.  
IMPRESSION:  
1. No CT evidence of acute PE.  
2. Left lower lobe pneumonia.  
3. Cholelithiasis.  
Electronically signed by:   
Sonu Segura MD 2024   
07:34 PM EDT RP Workstation:   
[id number]  
Technologist: DONA LEVINE  
Dictated By: SONU SEGURA MD  
Signed By: SONU SEGURA MD  
Signed Out: 24 19:34:12 Normal                                  [facility]  
   
                                                    CT BRAIN HEAD WO CONTRASTon   
2024   
   
                                                    CT BRAIN HEAD WO   
CONTRAST                                CT HEAD WITHOUT IV CONTRAST  
CLINICAL STATEMENT: SYNCOPE.  
TECHNOLOGIST NOTES: WHAT   
SYMPTOMS ARE YOU   
EXPERIENCING?; SYNCOPE,   
WEAKNESS  
TECHNIQUE: Axial CT images   
from skull base to vertex   
without IV contrast. This exam   
was performed according to the   
departmental dose-optimization   
program which includes   
automated exposure control,   
adjustment of the mA and/or kV   
according to patient size   
and/or use of iterative   
reconstruction technique.  
COMPARISON: None.  
FINDINGS: There is no acute   
intracranial hemorrhage, mass,   
mass effect or abnormal   
extra-axial fluid collection.   
Diffuse cerebral atrophy with   
mild periventricular and deep   
white matter chronic   
microvascular changes. No   
evidence of an acute   
territorial infarct is   
identified. The ventricles are   
normal.  
The skull base and calvarium   
demonstrate no abnormality.   
The included paranasal sinuses   
are clear. Included mastoid   
air cells are clear.  
IMPRESSION: No acute   
intracranial abnormality.  
Electronically signed by:   
Sonu Segura MD 2024   
05:31 PM EDT RP Workstation:   
[id number]  
Technologist: ND,SK,SK  
Dictated By: SONU SEGURA MD  
Signed By: SONU SEGURA MD  
Signed Out: 24 17:31:30 Normal                                  [facility]  
   
                                                    D Dimer HSon 2024   
   
                      D Dimer  ng/mL FEU High       <=499      [facility]  
   
                                                    Comment on   
above:                                  Result Comment: Exclusion of PE and DVT  
The D Dimer HS assay is reported in ng/ml Fibrinogen Equivalent Units   
(FEU). Per ?s instructions for use, a value less than   
500ng/ml (FEU) may help to exclude DVT and /or PE in outpatients when   
the assay is used with a clinical pretest probability assessment.  
D-Dimer used for DIC Screens  
Assay results should be used with other information, including the   
clinical context in forming a diagnosis.   
   
                                                            Performed By: #### 1  
32485, 6961982, 291292, CD:[id number], 391552,   
318956, 222839, CD:[id number] ####[facility]   
[id number] [place] [phone] Director: Bernard Walls MD   
   
                                                    ED Data ECD - Texton   
024   
   
                                                    ED Data ECD -   
Text                                    ED Data ECD Entered On:   
2024 17:43 EDT  
Performed On: 2024 16:30   
EDT by Leanne Holm RN  
  
  
  
  
ED General Intake Information  
Information Given By : Patient  
Morris Coma : Document   
Morris Coma Scale  
Problem History : Document   
Problem History  
Procedure History : Document   
Procedure History  
Safety Screening : Document   
Safety Screening  
Referral Source : Other:   
resturant  
Mode of Arrival * : Ambulance   
/ PD  
ED KINDER Falls Risk :   
Document Falls Assessment  
Infection Screening : Document   
Infection Screening  
Depression Screening :   
Document Depression Screening  
Would you accept a blood   
transfusion if necessary? :   
Yes  
Social History : Document   
Social History  
Currently Pregnant or   
Breastfeeding : Not Applicable  
Leanne Holm RN - 2024   
17:37 EDT  
Prearrival FCT  
EKG : 12 Lead  
Leanne Holm RN - 2024   
17:37 EDT  
Morris Coma Scale  
Eye Opening : Spontaneously  
Best Verbal Response :   
Oriented  
Best Motor Response : Obeys   
simple commands  
[place] Coma Score (Ref) : 15  
Leanne Holm RN - 2024   
17:37 EDT  
Problem History  
(As Of: 2024 17:43:51   
EDT)  
Diagnoses(Active)  
Syncope/Near syncope Date:   
2024 ; Diagnosis Type:   
Reason For Visit ;   
Confirmation: Confirmed ;   
Clinical Dx: Syncope/Near   
syncope ; Classification:   
Medical ; Clinical Service:   
Non-Specified ; Code: PNED ;   
Probability: 0 ; Diagnosis   
Code:   
[id number]  
[id number]  
  
Procedure History ED  
Urinary Catheter Present on   
Admit to ED? : No  
Devices Present on Arrival To   
ED : None  
Leanne Holm RN 2024   
17:37 EDT  
  
-  
Procedure History  
(As Of: 2024 17:43:51   
EDT)  
Safety Screening  
Abuse/Violence Concerns? :   
Patient denies  
Does the patient have a   
medically restricted   
extremity? : No  
Leanne Holm RN 2024   
17:37 EDT  
KINDER1 Fall Risk Assessment  
*Presents to ED Because of   
Falls : Yes  
*Age > 70 : Yes  
*Altered Mental Status : No  
*Impaired Mobility : No  
*Nursing Judgment : No  
Falls Risk Assessment : High   
risk for falls  
Fall Interventions Initiated :   
Involve family/friend in the   
care of patient, Fall risk   
sticker applied, Call light   
within reach and education   
provided  
Leanne Holm RN 2024   
17:37 EDT  
Infection Screening  
Travel outside of [place]   
[place] within past 21 days? :   
No  
Positive COVID test in the  
last 10 days? : No  
Exposure to and/or close   
contact  
with a person who has a  
laboratory-confirmed COVID   
test within the last 48 hours.   
: No  
Leanne Holm RN 2024   
17:37 EDT  
Depression Screening  
Patient able to verbalize? :   
Yes  
Feeling Down, Depressed,   
Hopeless : Not at all  
Little Interest - Pleasure in   
Activities : Not at all  
Initial Depression Screen   
Score : 0  
Depression Screening Score 0 :   
No  
IP Pt being evaluated or   
treated for BH conditions : No  
Leanne Holm RN 2024   
17:37 EDT  
Social History  
Are you being seen for an   
alcohol related problem? : No  
Do you or one of your family   
members feel like you have a   
drinking problem? : No  
Leanne Holm RN 2024   
17:37 EDT  
Social History  
(As Of: 2024 17:43:51   
EDT)  
Alcohol:  
Current, Beer, 1-2 times per   
month (Last Updated:   
2024 17:43:42 EDT by   
Leanne Holm RN)  
  
Tobacco: Denies Tobacco Use  
(Last Updated: 2024   
17:43:45 EDT by Leanne Holm RN   
)  
  
Substance Abuse: Denies   
Substance Abuse  
(Last Updated: 2024   
17:43:46 EDT by Leanne Holm RN   
)                   Normal                                  [facility]  
   
                                                    ED Discharge Educationon    
   
                                                    ED Discharge   
Education                       Normal                          [facility]  
   
                                                    ED Emergency Severity Index   
Adult-Texton 2024   
   
                                                    ED Emergency   
Severity Index   
Adult-Text                              ELVIN - Adult Entered On:   
2024 17:43 EDT  
Performed On: 2024 17:37   
EDT by Leanne Holm RN  
  
  
  
  
ELVIN  
DCP GENERIC CODE  
Visit Reason : SYNCOPE  
Tracking Triage Date/Time :   
2024 17:43 EDT  
Tracking Reg Status : Complete  
Tracking Acuity : 2-Emergent  
Tracking Group : [facility] Tracking  
Leanne Holm RN - 2024   
17:37 EDT           Normal                                  [facility]  
   
                                                    ED Patient Summaryon   
024   
   
                                                    ED Patient   
Summary                                 [facility] Emergency Department   
Discharge Instructions  
[address] [place] [address]  
Phone [phone]  
(Patient Copy)  
  
Name: NICK CLOUD :   
1955  
Allergies: No Known Medication   
Allergies  
Diagnosis: 1:Near syncope;   
2:Hypotension; 3:Acute kidney   
injury  
  
MRN: [id number] Visit Date:   
2024 16:23:37  
FIN#: [id number] Current   
Date Time: 2024 21:27:37  
[facility] [address]  
Phone: [phone]  
  
Primary Care Provider:  
Name: JASON BONILLA  
Phone: [phone]  
  
Emergency Department Care   
Providers:  
Primary Physician: RAMAN SMITH MD  
  
  
Thank you for choosing   
[facility] for your   
emergency care. You are very   
important to us. Our goal is   
to demonstrate our high   
quality medical care, and   
provide you with a very good   
patient experience.  
You may receive a survey about   
our service. Please take the   
time to complete the survey   
and return it so we can   
continue to enhance our   
service.  
Thank you again for allowing   
the [facility]   
Emergency Department to care   
for your medical needs. If you   
have questions about your care   
or follow up information   
please contact us at   
[phone].  
Follow-Up Instructions  
______________________________  
______________________________  
________  
NICK CLOUD has been given   
these follow-up instructions:  
Patient Education Materials  
______________________________  
______________________________  
________  
NICK CLOUD has been given the   
following patient education   
materials:  
  
BEFORE YOU LEAVE  
Set up your [facility]   
[employer] account!  
  
[employer] is a secure,   
online health management tool   
that connects you to portions   
of your hospital-based   
electronic medical record,   
allowing you to see test   
results, manage appointments,   
access discharge care   
instructions and much more.  
  
You can access [employer]   
from a computer, tablet or   
smartphone.   
Enrollment/registration is   
required. If you do not have a   
[employer] account, please   
provide us with an email   
address before you leave so   
that we may set up an account   
for you.  
  
New to [employer]!  
You may now securely connect   
some of the health management   
apps you use (e.g., fitness   
trackers, dietary trackers,   
etc.) to your health record in   
[facility]   
[employer]. This new feature   
provides expanded access to   
your health and wellness data,   
which will help you and your   
care team make informed   
decisions about your health   
care.  
If you are interested in using   
a health management radha not   
currently connected to   
[employer], contact a Guest   
 at   
[phone] or   
[email or web address]. We   
will determine if the radha   
meets the technical   
requirements to connect to   
[facility]   
[employer] and assure the   
security of your private   
health information.  
  
Medication Information  
______________________________  
______________________________  
________  
NICK CLOUD has been given the   
following medication   
information:  
No Discharge Medications.  
Understanding your home   
medicine is important to   
keeping you healthy. If you   
are taking medications that   
are not on the preceding list,   
please call your doctor to see   
if you are to continue that   
medication. It is important   
that you do not skip or make   
up doses. If you are ordered   
an antibiotic, finish taking   
all the medicine, unless your   
doctor tells you otherwise.   
Call your doctor if you have   
questions or problems. Take   
the medicine list with you to   
all follow up appointments.  
  
If you or a loved one is   
struggling with a mental   
health or substance abuse   
issue, please call [facility]   
Health Services at   
[phone] or the [employer]   
Suicide Prevention Lifeline at   
1-988.218.9155.  
  
  
  
ISAVANNAH GARY, have received   
the follow-up provider(s)   
list, medication information   
and patient education   
materials/instructions and   
have verbalized understanding.  
  
______________________________  
____  
Patient Signature   
______________  
Date ______________  
Time  
  
______________________________  
____  
Provider Signature   
______________  
Date ______________  
Time                Normal                                  [facility]  
   
                                                    ED Physician Reporton 2024   
   
                                                    ED Physician   
Report                                  NICK CLOUD  
:1955  
MRN:[id number]  
FIN:[id number]  
Registration Date:2024  
  
Basic Information  
Time Seen: 1623, immediately   
upon arrival  
Arrival Mode: Ambulance  
History Source: Patient EMS   
Family  
History limitation: None  
  
History of Present Illness  
Past medical history includes   
diabetes, hypertension, atrial   
fibrillation. Medications   
include Eliquis. Patient   
reports he had eaten today.   
Had not drank much in the way   
of water. He was at a   
bar/restaurant on the patio.   
He had had 2 beers. He was out   
on the bar patio. When he   
walked back into the building   
all of a sudden he felt   
lightheaded and dizzy. He felt   
he was going to pass out. Wife   
and daughter report that he   
looked pale and diaphoretic.   
He had nausea vomiting. No   
fall or injury. No syncope. No   
strokelike symptoms. No chest   
pain. No shortness of breath.   
EMS was called. Patient was   
treated with Zofran and IV   
fluids in route. Transferred   
to the ER for further   
evaluation. Patient reports   
that they recently returned   
from a driving trip to   
[place].  
  
Review of Systems  
Constitutional symptoms:   
Negative except as documented   
in HPI.  
Skin symptoms: Negative except   
as documented in HPI.  
Eye symptoms: Negative except   
as documented in HPI.  
ENMT symptoms: Negative except   
as documented in HPI.  
Respiratory symptoms: Negative   
except as documented in HPI.  
Cardiovascular symptoms:   
Negative except as documented   
in HPI.  
Gastrointestinal symptoms:   
Negative except as documented   
in HPI.  
Genitourinary symptoms:   
Negative except as documented   
in HPI.  
Musculoskeletal symptoms:   
Negative except as documented   
in HPI.  
Psychiatric symptoms: Negative   
except as documented in HPI.  
Neurologic symptoms: Negative   
except as documented in HPI.  
Additional review of systems   
information: All other systems   
reviewed and otherwise   
negative, Systems negative   
except as stated in the H&P.  
  
Physical Exam  
Vitals & Measurements  
Initial:  
T: 37 ?C (Oral) HR: 70   
(Peripheral) BP: 110/73 RR: 20   
SpO2: 94%  
O2 Therapy: Room air O2 Flow:   
2 L/min WT: 120.1 kg (Dosing)  
Latest:  
HR: 81 (Peripheral) HR: 83   
(Monitored) BP: 118/65 RR:   
[Image Removed]23 SpO2: 99%  
O2 Therapy: Nasal cannula O2   
Flow: 2 L/min  
Time Seen: _  
Vital Signs: Per nurse's   
notes.  
General: Alert  
Skin: Warm, dry, no rash.  
Head: Normocephalic,   
atraumatic.  
Neck: Supple, trachea midline.  
Eye: Pupils are equal, round   
and reactive to light,   
extraocular movements are   
intact, normal conjunctiva.  
Ears, nose, mouth and throat:   
Tympanic membranes clear, oral   
mucosa moist.  
Cardiovascular: Regular rate   
and rhythm, no murmur.  
Respiratory: Lungs are clear   
to auscultation, respirations   
are non-labored, breath sounds   
are equal.  
Chest wall: No tenderness, no   
deformity.  
Gastrointestinal: Soft,   
nontender, non distended,   
normal bowel sounds.  
Lymphatics: No   
lymphadenopathy.  
Psychiatric: Cooperative,   
appropriate mood & affect.  
Neurological: Alert and   
oriented to person, place,   
time, and situation, no focal   
neurological deficit observed.  
  
Procedure  
CRITICAL CARE STATEMENT  
Total time 35_  
Total time EXCLUDES procedure   
time, teaching time, or   
separately billable procedures   
_  
Data interpretation as   
discussed in MDM _  
ED interventions as discussed   
in MDM _  
Impending deterioration   
cardiovascular  
Associated risk factors   
hypotension  
  
Medical Decision Making  
[facility] Data  
Differential diagnosis:   
syncope, near syncope,   
vasovagal episode,   
dysrhythmia, hypotension,   
dizziness, seizure, CVA,   
orthostatic hypotension  
Documents reviewed: ED nursing   
notes__patient has no records   
in our system and no external   
records to review  
Decision rules/scores   
evaluated:  
[facility] Testing Review  
Data Interpretation: All   
laboratory, radiology, and any   
other additional   
testing/evaluations ordered by   
me and resulted during this   
encounter were reviewed by me.   
Relevant results have been   
considered in medical decision   
making.  
[facility] Treatment  
ED course: CMP with creatinine   
of 1.3. No previous values to   
compare to. Concern possibly   
for some dehydration and acute   
kidney injury. Treated with IV   
fluids. Serial troponins   
negative. INR 2.1. Patient is   
on Coumadin. White count 7.6.   
Hemoglobin 10.8. Urinalysis   
unremarkable. CT brain with no   
acute intracranial   
abnormality. Chest x-ray with   
no acute cardiopulmonary   
process.  
Patient with soft blood   
pressure upon arrival 110/73   
then 95/63. This was treated   
with IV fluids. Also mild   
hypoxia with a pulse ox of 94%   
on room air. He was placed on   
2 L nasal cannula. Patient   
recently with long car drive.   
D-dimer was ordered. Noted to   
be elevated at 552. Venous   
duplex of the leg did not   
reveal any evidence of DVT. CT   
angio chest did not show   
evidence of pulmonary embolus.   
There is an apparent left   
lower lobe infiltrate. Patient   
has no clinical correlation   
for pneumonia.  
Patient was treated in the ER   
with IV fluids and cardiac   
monitoring. Patient would   
benefit from overnight   
observation for syncopal   
episode. Will require   
telemetry monitoring, serial   
(more content not included)... Normal                                  [facility]  
   
                                                    ED Pre-Arrival Formon 2024   
   
                                                    ED Pre-Arrival   
Form                                    Pre-Arrival Summary  
Name: STR, Current Date:   
2024 16:27:10 EDT  
Gender:  
Date of Birth:  
Age:  
Pre-Arrival Type: EMS  
ETA: 2024 16:50:00 EDT  
Primary Care Physician:  
Presenting Problem:  
Pre-Arrival User: Joycelyn Rocha RN  
Referring Source:  
Location: 1  
[facility] Emergency Department  
[address] [place] [address].  
[place] [address]  
______________________________  
______________  
  
Notes:  
  
  
Vital Signs:  
  
  
Doctor Call Back:  
  
  
DNR Status:  
  
  
Miscellaneous Issues: Normal                                  [facility]  
   
                                                    ED Progress Noteon    
   
                                        ED Progress Note    pt to ed from [facility] by   
ems for near syncope, n/v   
episode. pt walked outside and   
when he came back and sat down   
he became dizzy, lightheaded   
and near syncope. pt had   
episode of vomiting. pt did   
have 2 beers today and did   
eat. pt bg 198. pt was given   
4mg Zofran and ivf. pt has hx   
of afib and is in afib upon   
arrival. pt recently got back   
from [place] on tuesday by   
car. pt denies sob, cp. pt on   
monitor call light in reach.   
ekg, labs complete md to   
bedside.  
Electronically Signed by:  
Leanne Holm RN  
on 2024 17:53 EDT  
- Received report from Leanne MCCULLOUGH RN.  
- Pt updated on   
continuation of care.  
- Dr. Smith at bedside   
to update pt on continuation   
of care.  
- Report called to Milagro Marmolejo on . Pt put in for   
transport.  
Electronically Signed by:  
Thanh Pickering RN  
on 2024 21:00 EDT Normal                                  [facility]  
   
                                                    ED Triage ECD - Texton    
   
                                                    ED Triage ECD -   
Text                                    ED Triage ECD Entered On:   
2024 17:42 EDT  
Performed On: 2024 16:30   
EDT by Leanne Holm RN  
  
  
  
  
Triage  
Temperature Oral : 37   
degC(Converted to: 98.6 degF)  
Systolic Blood Pressure : 110   
mmHg  
Diastolic Blood Pressure : 73   
mmHg  
Heart Rate : 70 bpm  
Respiratory Rate : 20 br/min  
Oxygen Saturation : 94 %  
ED Document Sepsis Screening :   
Document Sepsis Screening  
ED Document Reason for Visit :   
Document Reason for Visit  
Scale Type : Bed Scale   
(digital)  
Weight Dosing : 120.1   
kg(Converted to: 264 lb 12 oz)  
BMI Dosing Calculation : 39  
Height/Length Dosing : 175.26   
cm(Converted to: 5 ft 9 in)  
Pain Symptoms : No  
Leanne Holm RN - 2024   
17:37 EDT  
(As Of: 2024 17:42:04   
EDT)  
Diagnoses(Active)  
Syncope/Near syncope Date:   
2024 ; Diagnosis Type:   
Reason For Visit ;   
Confirmation: Confirmed ;   
Clinical Dx: Syncope/Near   
syncope ; Classification:   
Medical ; Clinical Service:   
Non-Specified ; Code: PNED ;   
Probability: 0 ; Diagnosis   
Code:   
[id number]  
[id number]  
  
Reason for Visit  
(As Of: 2024 17:42:04   
EDT)  
Diagnoses(Active)  
Syncope/Near syncope Date:   
2024 ; Diagnosis Type:   
Reason For Visit ;   
Confirmation: Confirmed ;   
Clinical Dx: Syncope/Near   
syncope ; Classification:   
Medical ; Clinical Service:   
Non-Specified ; Code: PNED ;   
Probability: 0 ; Diagnosis   
Code:   
[id number]  
[id number]  
  
Sepsis Screening  
Sepsis Vitals Screening ED :   
None/ NA(Peds)  
Leanne Holm RN - 2024   
17:37 EDT           Normal                                  [facility]  
   
                                                    HEMOon 2024   
   
                      DIFF?      No         Normal                [facility]  
   
                                                    Comment on   
above:                                  Performed By: #### [id number], [id number], 1259 18, [id number], 221565,   
821004, 722077, [id number] ####  
[facility]  
[address] [place], [place] [address] [phone]  
Medical Director: Bernard Walls MD   
   
                                                    Erythrocyte   
distribution   
width (RBC)   
[Ratio]         14.7 %          High            11.5-14.5       [facility]  
   
                                                    Comment on   
above:                                  Performed By: #### 474836, 0985132, 2002 15, [id number], 933735,   
676204, 538000, [id number] ####  
[facility]  
[address] [place], [place] [address] [phone]  
Medical Director: Bernard Walls MD   
   
                                                    Hematocrit (Bld)   
[Volume   
fraction]       29.8 %          Low             41.0-52.0       [facility]  
   
                                                    Comment on   
above:                                  Performed By: #### 597348, 3582693, 1169  
85, CD:[id number], 212338,   
693321, 064235, CD:[id number] ####  
[facility]  
[address] [place] [address]  
[phone]  
Medical Director: Bernard Walls MD   
   
                                                    Hemoglobin (Bld)   
[Mass/Vol]      10.8 g/dL       Low             13.5-17.5       [facility]  
   
                                                    Comment on   
above:                                  Performed By: #### 371215, 8422854, 1169  
19, [id number], 919288,   
102057, 807670, CD:[id number] ####  
[facility]  
[address] [place] [address]  
[phone]  
Medical Director: Bernard Walls MD   
   
                      Instr WBC  7.6        Normal                [facility]  
   
                                                    Comment on   
above:                                  Performed By: #### 845601, 2993954, 1162 60, CD:[id number], 355734,   
126792, 352862, CD:[id number] ####  
[facility]  
[address] [place] [address]  
[phone]  
Medical Director: Bernard Walls MD   
   
                                                    MCH (RBC)   
[Entitic mass]  38.2 pg         High            27.0-34.0       [facility]  
   
                                                    Comment on   
above:                                  Performed By: #### 162195, 0877291, 1169  
36, CD:[id number], 440021,   
497029, 631937, [id number] ####  
[facility]  
[address] [place] [address]  
[phone]  
Medical Director: Bernard Walls MD   
   
                                                    MCHC (RBC)   
[Mass/Vol]      36.1 g/dL       Normal          32.0-37.0       [facility]  
   
                                                    Comment on   
above:                                  Performed By: #### 217409, 6739630, 1169  
85, CD:[id number], 753288,   
441464, 726077, CD:[id number] ####  
[facility]  
[address] [place] [address] [place] [address]  
[phone]  
Medical Director: Bernard Walls MD   
   
                                                    MCV (RBC)   
[Entitic vol]   105.8 fL        High            80.0-100.0      [facility]  
   
                                                    Comment on   
above:                                  Performed By: #### 157228, 1409785, 1169  
85, CD:[id number], 633722,   
515091, 295149, CD:[id number] ####  
[facility]  
[address] [place] [address]  
[phone]  
Medical Director: Bernard Walls MD   
   
                      MDW        19.50      Normal     13.98-20.00 [facility]  
   
                                                    Comment on   
above:                                  Result Comment: MDW Interpretation:  
- For adults age 18-89 in ED, MDW >20.0 may be associated with a   
higher risk of Sepsis during the first 12 hours of hospital   
admission.  
- The predictive value of MDW for identifying Sepsis in patients with   
hematological abnormalities has not been established.  
- Interpret with caution when immature granulocytes, variant lymphs,   
or blast cells are noted on the differential.  
- Confirm patient age is within intended use population (18-89 years)   
for MDW.  
- For ED adults suspected of Sepsis, MDW less than or equal to 20.0   
does not rule out Sepsis or the risk of Sepsis.   
   
                                                            Performed By: #### 1  
65861, 4810163, 998566, CD:[id number], 916176,   
118471, 615312, CD:[id number] ####  
[facility]  
[address] [place] [address]  
[phone]  
Medical Director: Bernard Walls MD   
   
                      Nucleated RBC 0 /100WBC  Normal                [facility]  
   
                                                    Comment on   
above:                                  Performed By: #### 302364, 0781056, 1169  
85, CD:[id number], 396104,   
610802, 981357, CD:[id number] ####  
[facility]  
[address] [place] [address]  
[phone]  
Medical Director: Bernard Walls MD   
   
                      Platelet   176 x10    Normal     150-450    [facility]  
   
                                                    Comment on   
above:                                  Performed By: #### 612502, 5302766, 1169  
85, CD:[id number], 659219,   
546969, 261159, CD:[id number] ####  
[facility]  
[address] [place], [place] [address] [phone]  
Medical Director: Bernard Walls MD   
   
                                                    Platelet mean   
volume (Bld)   
[Entitic vol]   7.7 fL          Normal          7.4-10.4        [facility]  
   
                                                    Comment on   
above:                                  Performed By: #### 659418, 6049655, 1169  
85, CD:[id number], 691359,   
416120, 561966, CD:[id number] ####  
[facility]  
[address] [place] [address]  
[phone]  
Medical Director: Bernard Walls MD   
   
                      RBC        2.82 x10   Low        4.70-6.10  [facility]  
   
                                                    Comment on   
above:                                  Result Comment: Note: RBC morphology is   
normal unless otherwise   
stated. Evaluation performed only if differential is requested.   
   
                                                            Performed By: #### 1  
42375, 9432731, 363502, CD:[id number], 792123,   
575894, 205016, CD:[id number] ####  
[facility]  
[address] [place], [place] [address]  
[phone]  
Medical Director: Bernard Walls MD   
   
                      WBC        7.6 x10    Normal     4.5-11.0   [facility]  
   
                                                    Comment on   
above:                                  Performed By: #### 587610, 6361775, 1169  
85, CD:[id number], 323387,   
032561, 344474, CD:[id number] ####  
[facility]  
[address] [place], [place] [address]  
[phone]  
Medical Director: Bernard Walls MD   
   
                                                    Nursing Dysphagia Screeningo  
n 2024   
   
                                                    Nursing   
Dysphagia   
Screening                               Nursing Dysphagia Screening   
Entered On: 2024 21:30   
EDT  
Performed On: 2024 21:30   
EDT by Milagro Ortiz RN  
  
  
  
  
Dysphagia Screening  
Pt alert follows simple   
commands : Yes  
Clear strong voice upon   
request : Yes  
Pt speech not slurred or   
garbled : Yes  
Pt has voluntary cough : Yes  
Pt able to swallow own   
secretions : Yes  
Swallows 60ml water w/o issue   
: Yes  
W/O cough, throat clearing sev   
mins after screen : Yes  
Milagro Ortiz RN - 2024   
21:30 EDT           Normal                                  [facility]  
   
                                                    Nursing   
Dysphagia   
Screening                               Nursing Dysphagia Screening   
Entered On: 2024 17:44   
EDT  
Performed On: 2024 17:37   
EDT by Leanne Holm RN  
  
  
  
  
Dysphagia Screening  
Pt alert follows simple   
commands : Yes  
Clear strong voice upon   
request : Yes  
Pt speech not slurred or   
garbled : Yes  
Pt has voluntary cough : Yes  
Pt able to swallow own   
secretions : Yes  
Swallows 60ml water w/o issue   
: Yes  
W/O cough, throat clearing sev   
mins after screen : Yes  
Leanne Holm RN - 2024   
17:37 EDT           Normal                                  [facility]  
   
                                                     Detailson 2024   
   
                                                       
Details                                  Details Entered   
On: 2024 23:06 EDT  
Performed On: 2024 23:06   
EDT by Milagro Ortiz RN  
  
  
  
  
 Details  
Transport Mode Order Detail EV   
: Wheelchair  
Isolation Precautions RTF :   
Communication CONSTANT Order,   
2024 22:48:00 EDT,   
Constant Order, STAT EKG for   
Chest Pain, STAT ABGs for   
Acute Respiratory Distress,   
STAT Potassium/Magnesium for   
any significant change in   
condition/rhythm, Ordered  
Communication CONSTANT Order,   
2024 22:48:00 EDT,   
Constant Order, Current ACLS   
Provider may, Initiate   
American [employer] Association   
Advanced Cardiac Life support   
Algorithm per patient code   
status, Ordered  
Level of Care Order,   
2024 20:03:00 EDT,   
Medical Outpatient with   
Observation Services, 2DMO,   
RAMAN SMITH MD, Ordered  
Transfer Care of Patient to   
Attending, 2024 20:03:00   
EDT, Upon discharge from the   
ED, all continued medications   
and orders become the   
responsibility of the   
admitting/attending   
physician., Ordered  
  
Isolation Precaution Order   
Detail EV : NONE  
IV Order Detail - EV : Yes  
Oxygen Order Detail EV : No  
Pregnant Order Detail EV : No  
Pacemaker Order Detail : 0  
 Details Review   
Status : Reviewed, changes   
made  
Nurse Collects Blood Specimens   
: No  
Milagro Ortiz RN - 2024   
23:06 EDT           Normal                                  [facility]  
   
                                                    Comment on   
above:                                  Order Comment: Order entered secondary t  
o admission   
   
                                                       
Details                                  Details Entered   
On: 2024 22:38 EDT  
Performed On: 2024 22:38   
EDT by Milagro Ortiz RN  
  
  
  
  
 Details  
Transport Mode Order Detail EV   
: Wheelchair  
Isolation Precautions RTF :   
Level of Care Order,   
2024 20:03:00 EDT,   
Medical Outpatient with   
Observation Services, 2DMO,   
LALIT CALZADA, RAMAN, Ordered  
Transfer Care of Patient to   
Attending, 2024 20:03:00   
EDT, Upon discharge from the   
ED, all continued medications   
and orders become the   
responsibility of the   
admitting/attending   
physician., Ordered  
  
Isolation Precaution Order   
Detail EV : NONE  
IV Order Detail - EV : No  
Oxygen Order Detail EV : No  
Pregnant Order Detail EV : No  
Pacemaker Order Detail : 0  
 Details Review   
Status : Initial Review  
Nurse Collects Blood Specimens   
: Milagro Edmondson RN - 2024   
22:38 EDT           Normal                                  [facility]  
   
                                                    Comment on   
above:                                  Order Comment: Order entered secondary t  
o admission   
   
                                                    PT INRon 2024   
   
                                                    INR Coag (PPP)   
[Relative time] 2.1 {INR}       Normal                          [facility]  
   
                                                    Comment on   
above:                                  Result Comment: INR Reference Range:  
Normal reference range for INR on patients not on anticoagulant   
therapy: 0.9-1.1  
General therapeutic range for patients on anticoagulant therapy:   
2.0-3.5   
   
                                                            Performed By: #### 1  
[id number], [id number], [id number], [id number], [id number],   
715353, [id number], [id number] ####  
[facility]  
[address] [place] [address] [id number] [phone]  
Medical Director: Bernard Walls MD   
   
                      Protime Patient 23.6 seconds High       9.8-12.8   [facility]  
   
                                                    Comment on   
above:                                  Performed By: #### [id number], [id number], 1169  
85, [id number], [id number],   
888244, 191475, [id number] ####  
[facility]  
[address] [place] [address]  
[phone]  
Medical Director: Bernard Walls MD   
   
                                                    Pharmacy Clinical Interventi  
ons-Texton 2024   
   
                                                    Pharmacy   
Clinical   
Interventions-Te  
xt                                      Pharmacy Clinical   
Interventions Entered On:   
2024 17:16 EDT  
Performed On: 2024 17:15   
EDT by Cheryl Benavidez CPhT  
  
  
  
  
Interventions  
Intervention Type Pharmacy :   
Medication history  
Pharmacy Order Initiated By :   
Pharmacy Technician  
Clinical Importance Pharmacy :   
Potentially minor  
Prescriber Response Pharmacy :   
Corrected prior to contact  
Patient Clinical Outcome   
Pharmacy : Avoided potential   
risk  
Pharmacist Intervention Time :   
30  
Pharmacy Additional   
Information : updated med list   
with pt wife  
added all med stoday  
[place]  
no meds in ext to compare to  
nkda  
pt took am meds  
  
Cheryl Benavidez CPhT -   
2024 17:15 EDT Normal                                  [facility]  
   
                                                    TROPONIN HS 0HRon 2024  
   
   
                      Troponin HS 0 Hr 6 pg/mL    Normal     3-53       [facility]  
   
                                                    Comment on   
above:                                  Result Comment: Specimens from some colt  
viduals with pathologically   
high gamma globulin levels may demonstrate depressed troponin values   
   
                                                            Performed By: #### 1  
[id number], [id number], 976214, [id number], [id number],   
743609, 339129, [id number] ####[facility]   
[id number] [place] [phone] Director: Bernard Walls MD   
   
                                                    TROPONIN HS 2HRon 2024  
   
   
                                                    Delta Troponin 2   
Hr              0 pg/mL         Normal          0-14            [facility]  
   
                                                    Comment on   
above:                                  Result Comment: The term acute myocardia  
l infarction should be used   
when there is acute myocardial injury with clinical evidence of acute   
myocardial ischemia and the rise or fall of serial Troponin HS values   
(delta troponin) greater than or equal to 15 pg/mL with at least one   
Troponin HS value above the 99th percentile reference range   
   
                                                            Performed By: #### C  
[id number] ####  
[facility]  
[address] [place] [address]  
[phone]  
Medical Director: Bernard Walls MD   
   
                      Troponin HS 2 Hr 6 pg/mL    Normal     3-53       [facility]  
   
                                                    Comment on   
above:                                  Result Comment: Specimens from some colt  
viduals with pathologically   
high gamma globulin levels may demonstrate depressed troponin values   
   
                                                            Performed By: #### C  
[id number] ####  
[facility]  
[address] [place] [address]  
[phone]  
Medical Director: Bernard Walls MD   
   
                                                    UAon 2024   
   
                      Appearance, U Clear      Normal     Clear      [facility]  
   
                                                    Comment on   
above:                                  Performed By: #### [id number] ####  
[facility]  
[address] [place] [address]  
[phone]  
Medical Director: Bernard Walls MD   
   
                      Bilirubin, U Negative   Normal     Negative   [facility]  
   
                                                    Comment on   
above:                                  Result Comment: Bilirubin, U:  
Initial positive urine bilirubin results are not confirmed.   
Interfering substances may include elevated urobilinogen.  
Trace = 0.5-1.0 mg/dL  
Small = 2.0-4.0 mg/dL  
Moderate = 6.0-8.0 mg/dL  
Large = 10 mg/dl and greater   
   
                                                            Performed By: #### 3  
[id number] ####  
[facility]  
[address] [place] [address]  
[phone]  
Medical Director: Bernard Walls MD   
   
                      Blood, U   Negative   Normal     Negative   [facility]  
   
                                                    Comment on   
above:                                  Result Comment: Blood, U:  
Trace = 0.03-0.05 mg/dL  
Small = 0.06-0.1 mg/dL  
Moderate = 0.2-0.5 mg/dL  
Large = 1.0 mg/dL and greater   
   
                                                            Performed By: #### 3  
[id number] ####  
[facility]  
[address] [place] [address]  
[phone]  
Medical Director: Bernard Walls MD   
   
                      Color, U   Yellow     Normal     Yellow     [facility]  
   
                                                    Comment on   
above:                                  Performed By: #### [id number] ####  
[facility]  
[address] [place] [address]  
[phone]  
Medical Director: Bernard Walls MD   
   
                      Glucose Qual, U 300 mg/dl  Abnormal   Negative   [facility]  
   
                                                    Comment on   
above:                                  Performed By: #### [id number] ####  
[facility]  
[address] [place] [address]  
[phone]  
Medical Director: Bernard Walls MD   
   
                      Ketones, U Negative   Normal     Negative   [facility]  
   
                                                    Comment on   
above:                                  Performed By: #### [id number] ####  
[facility]  
[address] [place] [address]  
[phone]  
Medical Director: Bernard Walls MD   
   
                                                    Leukocyte   
Esterase, U     Negative        Normal          Negative        [facility]  
   
                                                    Comment on   
above:                                  Result Comment: Leukocyte Esterase, U:  
Trace = 25 Karel/uL  
Small = 75 Karel/uL  
Moderate = 250 Karel/uL  
Large = 500 Karel/uL and greater   
   
                                                            Performed By: #### 3  
[id number] ####  
[facility]  
[address] [place] [address]  
[phone]  
Medical Director: Bernard Walls MD   
   
                      Nitrite, U Negative   Normal     Negative   [facility]  
   
                                                    Comment on   
above:                                  Performed By: #### [id number] ####  
[facility]  
[address] [place], [place] [address]  
[phone]  
Medical Director: Bernard Walls MD   
   
                      pH, U      5.5        Normal     4.5-8.0    [facility]  
   
                                                    Comment on   
above:                                  Performed By: #### [id number] ####  
[facility]  
[address] [place] [address]  
[phone]  
Medical Director: Bernard Walls MD   
   
                      Protein, U Negative   Normal     Negative   [facility]  
   
                                                    Comment on   
above:                                  Performed By: #### [id number] ####  
[facility]  
[address] [place], [place] [address]  
[phone]  
Medical Director: Bernard Walls MD   
   
                                                    Specific   
Banco, U      1.025           Normal          1.001-1.035     [facility]  
   
                                                    Comment on   
above:                                  Performed By: #### [id number] ####  
[facility]  
[address] [place], [place] [address]  
[phone]  
Medical Director: Bernard Walls MD   
   
                      U MICRO    Not Indicated Normal                [facility]  
   
                                                    Comment on   
above:                                  Performed By: #### [id number] ####  
[facility]  
[address] [place] [address]  
[phone]  
Medical Director: Bernard Walls MD   
   
                                                    Urobilinogen   
Qual, U         2 mg/dl         Abnormal        < 2 mg/dl       [facility]  
   
                                                    Comment on   
above:                                  Result Comment: Urobilinogen, U:  
EU/dl and mg/dl are equivalent units.   
   
                                                            Performed By: #### 3  
[id number] ####  
[facility]  
[address] [id number] [phone]  
Medical Director: Bernard Walls MD   
   
                                                    Vascular Lab Reporton 2024   
   
                                                    Vascular Lab   
Report                                  VASCULAR LAB PRELIMINARY   
RESULTS:  
Bilateral lower extremity   
venous Duplex is completed.   
There is no evidence of deep   
or superficial vein thrombosis   
of the bilateral lower   
extremities. MD report to   
follow.  
Electronically Signed by:  
Nadja Jin RVT  
on 2024 18:02 EDT Normal                                  [facility]  
   
                                                    XR CHEST PORTABLEon 20  
24   
   
                                                    XR CHEST   
PORTABLE                                XR CHEST 1 VIEW  
CLINICAL STATEMENT:   
Syncope;OTHER REASON.  
TECHNOLOGIST NOTES: WHAT   
SYMPTOMS ARE YOU   
EXPERIENCING?; syncope  
COMPARISON: None  
FINDINGS:  
No focal consolidation,   
pneumothorax, or sizable   
pleural effusion. The cardiac   
silhouette is prominent which   
may be attributed to AP   
technique.  
IMPRESSION:  
No acute cardiopulmonary   
abnormality.  
Electronically signed by:   
Roque Bone MD 2024   
05:14 PM EDT RP Workstation:   
[id number]  
Technologist:   
Dictated By: ROQUE BONE MD  
Signed By: ROQUE BONE MD  
Signed Out: 24 17:14:57 Normal                                  [facility]  
   
                                                    Comprehensive metabolic 2000  
 panelon 2024   
   
                                                    Albumin BCP dye   
[Mass/Vol]      4.2 g/dL        Normal          3.4-5.0         [facility]  
   
                                                    Comment on   
above:                                  Performed By: #### 97084-2 ####  
VALENTE BENSON ([id number])  
[facility] ([facility])  
[address] [place] [address]   
   
                                                    ALP [Catalytic   
activity/Vol]   50 U/L          Normal                    [facility]  
   
                                                    Comment on   
above:                                  Performed By: #### 90027-9 ####  
VALENTE BENSON ([id number])  
[facility] ([facility])  
[address] [place] [address]   
   
                                                    ALT With P-5'-P   
[Catalytic   
activity/Vol]   24 U/L          Normal          10-52           [facility]  
   
                                                    Comment on   
above:                                  Result Comment: Patients treated with Macdonald  
lfasalazine may generate   
falsely decreased results for ALT.   
   
                                                            Performed By: #### 2  
9713-8 ####  
VALENTE BENSON ([id number])  
[facility] ([facility])  
[address] [place] [address]   
   
                                                    Anion gap   
[Moles/Vol]     14 mmol/L       Normal          10-20           [facility]  
   
                                                    Comment on   
above:                                  Performed By: #### 24974-3 ####  
VALENTE BENSON ([id number])  
[facility] ([facility])  
[address] [place] [address]   
   
                                                    AST With P-5'-P   
[Catalytic   
activity/Vol]   22 U/L          Normal          9-39            [facility]  
   
                                                    Comment on   
above:                                  Performed By: #### 75597-0 ####  
VALENTE BENSON ([id number])  
[facility] ([facility])  
[address] [place] [address]   
   
                                                    Bilirubin   
[Mass/Vol]      1.3 mg/dL       High            0.0-1.2         [facility]  
   
                                                    Comment on   
above:                                  Performed By: #### 58746-3 ####  
VALENTE BENSON ([id number])  
[facility] ([facility])  
[address] [place] [address]   
   
                                                    Calcium   
[Mass/Vol]      9.6 mg/dL       Normal          8.6-10.3        [facility]  
   
                                                    Comment on   
above:                                  Performed By: #### 97761-4 ####  
VALENTE BENSON ([id number])  
[facility] ([facility])  
[address] [place] [address]   
   
                                                    Chloride   
[Moles/Vol]     109 mmol/L      High                      [facility]  
   
                                                    Comment on   
above:                                  Performed By: #### 50820-7 ####  
VALENTE BENSON ([id number])  
[facility] ([facility])  
[address] [place] [address]   
   
                      CO2 [Moles/Vol] 24 mmol/L  Normal     21-32      [facility]  
   
                                                    Comment on   
above:                                  Performed By: #### 10393-3 ####  
VALENTE BENSON ([id number])  
[facility] ([facility])  
[address] [place] [address]   
   
                                                    Creatinine   
[Mass/Vol]      1.09 mg/dL      Normal          0.50-1.30       [facility]  
   
                                                    Comment on   
above:                                  Performed By: #### 49950-9 ####  
VALENTE BENSON ([id number])  
[facility] ([facility])  
[address] [place] [address]   
   
                                                    Glomerular   
filtration   
rate/1.73 sq   
M.predicted     73 mL/min/1.73m*2 Normal          >60             [facility]  
   
                                                    Comment on   
above:                                  Result Comment: Calculations of estimate  
d GFR are performed using the   
 CKD-EPI Study Refit equation without the race variable for the   
IDMS-Traceable creatinine methods.  
https://jasn.asnjournals.org/content/early//ASN.5845047014   
   
                                                            Performed By: #### 2  
4323-8 ####  
VALENTE BENSON ([id number])  
[facility] ([facility])  
[address] [place] [address]   
   
                                                    Glucose   
[Mass/Vol]      153 mg/dL       High            74-99           [facility]  
   
                                                    Comment on   
above:                                  Performed By: #### 37338-6 ####  
VALENTE BENSON ([id number])  
[facility] ([facility])  
[address] [place] [address]   
   
                                                    Potassium   
[Moles/Vol]     4.6 mmol/L      Normal          3.5-5.3         [facility]  
   
                                                    Comment on   
above:                                  Performed By: #### 58378-3 ####  
VALENTE EBNSON ([id number])  
[facility] ([facility])  
[address] [place] [address]   
   
                                                    Protein   
[Mass/Vol]      6.1 g/dL        Low             6.4-8.2         [facility]  
   
                                                    Comment on   
above:                                  Performed By: #### 04122-8 ####  
VALENTE BENSON ([id number])  
[facility] ([facility])  
[address] [place] [address]   
   
                                                    Sodium   
[Moles/Vol]     142 mmol/L      Normal          136-145         [facility]  
   
                                                    Comment on   
above:                                  Performed By: #### 30904-3 ####  
VALENTE BESNON ([id number])  
[facility] ([facility])  
[address] [place] [address]   
   
                                                    Urea nitrogen   
[Mass/Vol]      20 mg/dL        Normal          6-23            [facility]  
   
                                                    Comment on   
above:                                  Performed By: #### 91317-7 ####  
VALENTE BENSON ([id number])  
[facility] ([facility])  
[address] [place] [address]   
   
                                                    HbA1c (Bld) [Mass fraction]o  
n 2024   
   
                                                    Average glucose   
Estimated from   
glycated   
hemoglobin (Bld)   
[Mass/Vol]          105 mg/dL           Normal              Not   
Established                             [facility]  
   
                                                    Comment on   
above:                                  Order Comment: Diagnosis of Diabetes-Markell  
lts  
Non-Diabetic: < or = 5.6%  
Increased risk for developing diabetes: 5.7-6.4%  
Diagnostic of diabetes: > or = 6.5%   
   
                                                            Performed By: #### 4  
548-4 ####  
MICKIE EWING ([id number])  
[facility] ([facility])  
[address] [place], [place] [id number]   
   
                                                    Hemoglobin A1c/Hemoglobin.to  
alvin 2024   
   
                                                    HbA1c (Bld)   
[Mass fraction] 5.3 %           Normal          see below       [facility]  
   
                                                    Comment on   
above:                                  Order Comment: Diagnosis of Diabetes-Markell  
lts  
Non-Diabetic: < or = 5.6%  
Increased risk for developing diabetes: 5.7-6.4%  
Diagnostic of diabetes: > or = 6.5%   
   
                                                            Performed By: #### 4  
548-4 ####  
MICKIE EWING ([id number])  
[facility] ([facility])  
[address] [place] [address]   
   
                                                    Epidural Injectionon   
024   
   
                                                            Rj Raines MD   
2024 3:45 PM  
Epidural Injection  
  
Performed by: Rj Raines MD  
Authorized by: Rj Raines MD  
Medications: 120 mg   
triamcinolone acetonide 40   
mg/mL  
Anesthetic used: Lidocaine 1%   
PF                                                          [facility]  
   
                                                    CBC panel Auto (Bld)on    
   
                                                    Erythrocyte   
distribution   
width (RBC)   
[Ratio]         14.3 %          Normal          11.5-14.5       [facility]  
   
                                                    Comment on   
above:                                  Performed By: #### 91022-1 ####  
VALENTE BENSON ([id number])  
[facility] ([facility])  
[address] [place], [place] [id number]   
   
                                                    Hematocrit (Bld)   
[Volume   
fraction]       42.6 %          Normal          41.0-52.0       [facility]  
   
                                                    Comment on   
above:                                  Performed By: #### 17090-8 ####  
VALENTE BENSON ([id number])  
[facility] ([facility])  
[address] [place] [address]   
   
                                                    Hemoglobin (Bld)   
[Mass/Vol]      14.6 g/dL       Normal          13.5-17.5       [facility]  
   
                                                    Comment on   
above:                                  Performed By: #### 09084-3 ####  
VALENTE BENSON ([id number])  
[facility] ([facility])  
[address] [place] [address]   
   
                                                    MCH (RBC)   
[Entitic mass]  34.1 pg         High            26.0-34.0       [facility]  
   
                                                    Comment on   
above:                                  Performed By: #### 11606-2 ####  
VALENTE BENSON ([id number])  
[facility] ([facility])  
[address] [place] [address]   
   
                                                    MCHC (RBC)   
[Mass/Vol]      34.3 g/dL       Normal          32.0-36.0       [facility]  
   
                                                    Comment on   
above:                                  Performed By: #### 29205-6 ####  
VALENTE BENSON ([id number])  
[facility] ([facility])  
[address] [place] [address]   
   
                                                    MCV (RBC)   
[Entitic vol]   100 fL          Normal                    [facility]  
   
                                                    Comment on   
above:                                  Performed By: #### 53307-0 ####  
VALENTE BENSON ([id number])  
[facility] ([facility])  
[address] [place] [address]   
   
                                                    Nucleated   
RBC/100 WBC   
(Bld) [Ratio]   0.0 /100 WBCs   Normal          0.0-0.0         [facility]  
   
                                                    Comment on   
above:                                  Performed By: #### 72782-8 ####  
VALENTE BENSON ([id number])  
[facility] ([facility])  
[address] [place] [address]   
   
                                                    Platelets (Bld)   
[#/Vol]         226 x10*3/uL    Normal          150-450         [facility]  
   
                                                    Comment on   
above:                                  Performed By: #### 92826-3 ####  
VALENTE BENSON ([id number])  
[facility] ([facility])  
[address] [place] [address]   
   
                                                    RBC (Bld)   
[#/Vol]         4.28 x10*6/uL   Low             4.50-5.90       [facility]  
   
                                                    Comment on   
above:                                  Performed By: #### 61009-7 ####  
VALENTE BENSON ([id number])  
[facility] ([facility])  
[address] [place] [address]   
   
                                                    WBC (Bld)   
[#/Vol]         7.8 x10*3/uL    Normal          4.4-11.3        [facility]  
   
                                                    Comment on   
above:                                  Performed By: #### 36516-5 ####  
VALENTE BENSON ([id number])  
[facility] ([facility])  
[address] [place] [address]   
   
                                                    Comprehensive metabolic 2000  
 panelon 2024   
   
                                                    Albumin BCP dye   
[Mass/Vol]      4.0 g/dL        Normal          3.4-5.0         [facility]  
   
                                                    Comment on   
above:                                  Performed By: #### 01795-7 ####  
VALENTE BENSON ([id number])  
[facility] ([facility])  
[address] [place] [address]   
   
                                                    ALP [Catalytic   
activity/Vol]   66 U/L          Normal                    [facility]  
   
                                                    Comment on   
above:                                  Performed By: #### 88847-9 ####  
VALENTE BENSON ([id number])  
[facility] ([facility])  
[address] [place] [address]   
   
                                                    ALT With P-5'-P   
[Catalytic   
activity/Vol]   25 U/L          Normal          10-52           [facility]  
   
                                                    Comment on   
above:                                  Result Comment: Patients treated with Macdonald  
lfasalazine may generate   
falsely decreased results for ALT.   
   
                                                            Performed By: #### 2  
4323-8 ####  
VALENTE BENSON ([id number])  
[facility] ([facility])  
[address] [place] [address]   
   
                                                    Anion gap   
[Moles/Vol]     10 mmol/L       Normal          10-20           [facility]  
   
                                                    Comment on   
above:                                  Performed By: #### 00843-7 ####  
VALENTE BENSON ([id number])  
[facility] ([facility])  
[address] [place] [address]   
   
                                                    AST With P-5'-P   
[Catalytic   
activity/Vol]   19 U/L          Normal          9-39            [facility]  
   
                                                    Comment on   
above:                                  Performed By: #### 35893-1 ####  
VALENTE BENSON ([id number])  
[facility] ([facility])  
[address] [place] [address]   
   
                                                    Bilirubin   
[Mass/Vol]      1.2 mg/dL       Normal          0.0-1.2         [facility]  
   
                                                    Comment on   
above:                                  Performed By: #### 46580-2 ####  
VALENTE BENSON ([id number])  
[facility] ([facility])  
[address] [place] [address]   
   
                                                    Calcium   
[Mass/Vol]      9.6 mg/dL       Normal          8.6-10.3        [facility]  
   
                                                    Comment on   
above:                                  Performed By: #### 50680-4 ####  
VALENTE BENSON ([id number])  
[facility] ([facility])  
[address] [place] [address]   
   
                                                    Chloride   
[Moles/Vol]     107 mmol/L      Normal                    [facility]  
   
                                                    Comment on   
above:                                  Performed By: #### 57317-9 ####  
VALENTE BENSON ([id number])  
[facility] ([facility])  
[address] [place] [address]   
   
                      CO2 [Moles/Vol] 30 mmol/L  Normal     21-32      [facility]  
   
                                                    Comment on   
above:                                  Performed By: #### 01856-0 ####  
VALENTE BENSON ([id number])  
[facility] ([facility])  
[address] [place] [address]   
   
                                                    Creatinine   
[Mass/Vol]      1.04 mg/dL      Normal          0.50-1.30       [facility]  
   
                                                    Comment on   
above:                                  Performed By: #### 35439-5 ####  
VALENTE BENSON ([id number])  
[facility] ([facility])  
[address] [place] [address]   
   
                                                    Glomerular   
filtration   
rate/1.73 sq   
M.predicted     78 mL/min/1.73m*2 Normal          >60             [facility]  
   
                                                    Comment on   
above:                                  Result Comment: Calculations of estimate  
d GFR are performed using the   
 CKD-EPI Study Refit equation without the race variable for the   
IDMS-Traceable creatinine methods.  
https://jasn.asnjournals.org/content/early//ASN.4663801668   
   
                                                            Performed By: #### 2  
4323-8 ####  
VALENTE BENSON ([id number])  
[facility] ([facility])  
[address] [place] [address]   
   
                                                    Glucose   
[Mass/Vol]      155 mg/dL       High            74-99           [facility]  
   
                                                    Comment on   
above:                                  Performed By: #### 14168-0 ####  
VALENTE BENSON ([id number])  
[facility] ([facility])  
[address] [place] [address]   
   
                                                    Potassium   
[Moles/Vol]     4.5 mmol/L      Normal          3.5-5.3         [facility]  
   
                                                    Comment on   
above:                                  Performed By: #### 81110-4 ####  
VALENTE BENSON ([id number])  
[facility] ([facility])  
[address] [place] [address]   
   
                                                    Protein   
[Mass/Vol]      6.4 g/dL        Normal          6.4-8.2         [facility]  
   
                                                    Comment on   
above:                                  Performed By: #### 92879-8 ####  
VALENTE BENSON ([id number])  
[facility] ([facility])  
[address] [place] [address]   
   
                                                    Sodium   
[Moles/Vol]     142 mmol/L      Normal          136-145         [facility]  
   
                                                    Comment on   
above:                                  Performed By: #### 49018-1 ####  
VALENTE BENSON ([id number])  
[facility] ([facility])  
[address] [place] [address]   
   
                                                    Urea nitrogen   
[Mass/Vol]      14 mg/dL        Normal          6-23            [facility]  
   
                                                    Comment on   
above:                                  Performed By: #### 90858-8 ####  
VALENTE BENSON ([id number])  
[facility] ([facility])  
[address] [place] [address]   
   
                                                    HbA1c (Bld) [Mass fraction]o  
n 2024   
   
                                                    Average glucose   
Estimated from   
glycated   
hemoglobin (Bld)   
[Mass/Vol]          143 mg/dL           Normal              Not   
Established                             [facility]  
   
                                                    Comment on   
above:                                  Order Comment: Diagnosis of Diabetes-Markell  
lts  
Non-Diabetic: < or = 5.6%  
Increased risk for developing diabetes: 5.7-6.4%  
Diagnostic of diabetes: > or = 6.5%  
Monitoring of Diabetes  
Age (y)....................... Therapeutic Goal (%)  
Adults: >18.........................<7.0  
Pediatrics: 13-18...................<7.5  
Pediatrics: 7-12....................<8.0  
Pediatrics: 0-6..................... 7.5-8.5  
American Diabetes Association. Diabetes Care 33(S1), 2010   
   
                                                            Performed By: #### 4  
548-4 ####  
VALENTE BENSON ([id number])  
[facility] ([facility])  
[address] [place] [address]   
   
                                                    Hemoglobin A1c/Hemoglobin.to  
alvin 2024   
   
                                                    HbA1c (Bld)   
[Mass fraction] 6.6 %           High            see below       [facility]  
   
                                                    Comment on   
above:                                  Order Comment: Diagnosis of Diabetes-Markell  
lts  
Non-Diabetic: < or = 5.6%  
Increased risk for developing diabetes: 5.7-6.4%  
Diagnostic of diabetes: > or = 6.5%  
Monitoring of Diabetes  
Age (y)....................... Therapeutic Goal (%)  
Adults: >18.........................<7.0  
Pediatrics: 13-18...................<7.5  
Pediatrics: 7-12....................<8.0  
Pediatrics: 0-6..................... 7.5-8.5  
American Diabetes Association. Diabetes Care 33(S1), 2010   
   
                                                            Performed By: #### 4  
548-4 ###Rosario BENSON ([id number])  
[facility] ([facility])  
[address] [place] [address]   
   
                                                    Epidural Injectionon   
023   
   
                                                            Rj Raines MD   
2023 9:38 AM  
Epidural Injection  
  
Performed by: Rj Raines MD  
Authorized by: Rj Raines MD  
Medications: 120 mg   
triamcinolone acetonide 40   
mg/mL  
Anesthetic used: Lidocaine 1%   
PF                                                          [facility]  
   
                                                    XR Foot Left 3+ Views (Stand  
ciara)on 2023   
   
                                                            No fractures disloca  
tions or   
subluxations seen. First MPJ   
joint space  
loss with mild hallux   
abductovalgus deformity.                                         [facility]  
   
                                                                  [facility]  
   
                                                    Radiology Study   
observation   
(narrative)                                                     [facility]  
   
                                                    Office Visit (Primary Care T  
xt/Forms)on 2023   
   
                                        Follow-up visit     Diagnoses/Problems  
Assessed  
Controlled type 2 diabetes   
mellitus without complication,   
without long-term current use  
of insulin (250.00) (E11.9)  
Controlled type 2 diabetes   
mellitus without complication,   
without long-term current use  
of insulin  
Hypertension, essential   
(401.9) (I10)  
Paroxysmal atrial fibrillation   
(427.31) (I48.0)  
Mixed hyperlipidemia (272.2)   
(E78.2)  
Nocturia (788.43) (R35.1)  
Orders  
Controlled type 2 diabetes   
mellitus without complication,   
without long-term current use  
of insulin, Hypertension,   
essential  
Complete Blood Count;   
Status:Active; Requested   
for:2023;  
Comprehensive Metabolic Panel;   
Status:Active; Requested   
for:2023;  
Controlled type 2 diabetes   
mellitus without complication,   
without long-term current use  
of insulin, Hypertension,   
essential, Mixed   
hyperlipidemia  
Hemoglobin A1C; Status:Active;   
Requested for:2023;  
Lipid Panel; Status:Active;   
Requested for:2023;  
TSH - Thyroid Stimulating   
Hormone, Serum; Status:Active;   
Requested for:2023;  
Nocturia  
Prostate Specific Antigen;   
Status:Active; Requested   
for:2023;  
Chief Complaint  
6 MO F/U. Rev Labs.  
  
History of Present Illness  
Diabetes Type II - Takes and   
tolerates medications. No   
hypoglycemia. Counseled on   
diet with low carbohydrate   
intake, weight loss and   
increased activity   
levels/exercise.  
Hypertension - Takes   
medication without side   
effects. No   
CP/SOB/Palpitations. Follows a   
no added salt diet. Counseled   
diet, activity and weight   
loss.  
Hyperlipidemia - Takes and   
tolerates medications without   
fatigue and myalgias.  
afib - Takes and tolerates   
medications.  
Recommend COVID booster  
Flu shot done  
Colonoscopy ,? Follow-up   
in 3 or 5 years.  
  
Review of Systems  
Constitutional: No weakness,   
No fatigue.  
Eye: No blurring, No visual   
disturbances.  
Respiratory: No shortness of   
breath, No cough.  
Cardiovascular: No chest pain,   
No palpitations.  
Gastrointestinal: No nausea,   
No diarrhea, No constipation.  
Musculoskeletal: No joint   
pain, No muscle pain.  
Integumentary: No rash, No   
breakdown.  
Neurologic: Alert and oriented   
X4, No headache.  
Psychiatric: No irritability,   
No sleeping problems.  
  
Active Problems  
Problems  
Controlled type 2 diabetes   
mellitus without complication,   
without long-term current use  
of insulin (250.00) (E11.9)  
Diverticulosis of colon   
(562.10) (K57.30)  
ED (erectile dysfunction) of   
organic origin (607.84)   
(N52.9)  
Fatigue (780.79) (R53.83)  
Fever (780.60) (R50.9)  
Gastroesophageal reflux   
disease without esophagitis   
(530.81) (K21.9)  
Hematuria (599.70) (R31.9)  
Hypertension, essential   
(401.9) (I10)  
Knee pain (719.46) (M25.569)  
Medicare annual wellness   
visit, subsequent (V70.0)   
(Z00.00)  
Mixed hyperlipidemia (272.2)   
(E78.2)  
Neoplasm of uncertain behavior   
of skin (238.2) (D48.5)  
Nocturia (788.43) (R35.1)  
Numbness (782.0) (R20.0)  
Osteoarthritis of right knee   
(715.96) (M17.11)  
Paroxysmal atrial fibrillation   
(427.31) (I48.0)  
Recurrent kidney stones   
(592.0) (N20.0)  
Retroperitoneal bleed (459.0)   
(R58)  
Right knee pain (719.46)   
(M25.561)  
Surgical History  
Problems  
History of Appendectomy  
History of Colonoscopy  
History of Foot surgery  
History of Tonsillectomy  
Family History  
Mother  
No pertinent family history  
Father  
No pertinent family history  
Social History  
Problems  
Consumes alcohol occasionally   
(V49.89) (Z78.9)  
Non-smoker (V49.89) (Z78.9)  
Patient has healthcare proxy   
and living will (V49.89)   
(Z78.9)  
Current Meds  
  
Medication NameInstruction  
Furosemide 40 MG Oral   
TabletTAKE 1 TABLET DAILY.  
Klor-Con M20 20 MEQ Oral   
Tablet Extended ReleaseTAKE 1   
TABLET DAILY.  
Lipitor 40 MG Oral TabletTAKE   
1 TABLET DAILY.  
Lisinopril 20 MG Oral   
TabletTAKE 1 TABLET DAILY.  
Lopressor 50 MG Oral   
TabletTAKE 1 TABLET EVERY 12   
HOURS DAILY.  
metFORMIN HCl - 500 MG Oral   
TabletTAKE 1 TABLET TWICE   
DAILY WITH MEALS.  
Sildenafil Citrate 50 MG Oral   
TabletTAKE 1 TABLET BY MOUTH   
AS NEEDED FOR ERECTILE   
DYSFUNCTION  
Xarelto 20 MG Oral TabletTake   
1 tablet daily  
Allergies  
Medication  
No Known Drug Allergies  
Vitals  
Vital Signs  
Recorded: 2023 04:08PM  
Heart Rate: 74  
Systolic: 110  
Diastolic: 70  
Height: 5 ft 11 in  
Weight: 262 lb 6 oz  
BMI Calculated: 36.59 kg/m2  
BSA Calculated: 2.37  
Tobacco Use: b) No  
PHQ-2 #1. Over the last 2   
weeks have you felt down,   
depressed or hopeless? (If   
yes, answer  
PHQ-9 below): No  
Falls Screening (Age 18+): a)   
No falls within the last year  
O2 Saturation: 98  
Physical Exam  
General: Alert and oriented,   
No acute distress.  
Ambulation status: With steady   
gait.  
Appearance: Well nourished,   
Calm.  
Behavior: Cooperative.  
Integumentary: Warm, Dry,   
Pink, Intact.  
Psychiatric: Cooperative,   
Appropriate mood AND affect,   
Normal judgment.  
  
Signatures  
Electronically signed by :   
Jason Bonilla MD; 2023 4:40PM EST (Author) Normal                                     
[employer]  
   
                                                    Tobacco Screening.on   
023   
   
                                                    Adult depression   
screening   
assessment      No                                              [employer] [facility]  
Work Phone:   
[phone]  
   
                                                    Fall risk   
assessment                              a) No falls within the last   
year                                                        [employer] [facility]  
Work Phone:   
[phone]  
   
                                                    Tobacco use   
status [facility]     b) No                                           [employer] [facility]  
Work Phone:   
[phone]  
   
                                                    COMPREHENSIVE PANELon 2023   
   
                                                    Albumin   
[Mass/Vol]      4.4 g/dL        Normal          3.4 - 5.0       [facility]  
   
                                                    Comment on   
above:                                  Performed By: #### CMP ####  
[facility] [address] [address]   
   
                                                    ALP [Catalytic   
activity/Vol]   66 U/L          Normal          33 - 136        [facility] [place] [place]  
Work Phone:   
[phone]  
  
   
                                                    Comment on   
above:                                  Performed By: #### CMP ####  
[address] [address]   
   
                                                    ALT [Catalytic   
activity/Vol]   44 U/L          Normal          10 - 52         [facility]  
   
                                                    Comment on   
above:                                  Result Comment: Patients treated with Macdonald  
lfasalazine may generate  
falsely decreased results for ALT.   
   
                                                            Performed By: #### C  
MP ####  
[address] [address]   
   
                                                    Anion gap   
[Moles/Vol]     8 mmol/L        Low             10 - 20         [facility] [place]  
Work Phone:   
[phone]  
  
   
                                                    Comment on   
above:                                  Performed By: #### CMP ####  
[address] [address]   
   
                                                    AST [Catalytic   
activity/Vol]   35 U/L          Normal          9 - 39          [facility]  
   
                                                    Comment on   
above:                                  Performed By: #### CMP ####  
[address] [address]   
   
                                                    Bilirubin   
[Mass/Vol]      1.3 mg/dL       High            0.0 - 1.2       [facility] [place]  
Work Phone:   
[phone]  
  
   
                                                    Comment on   
above:                                  Performed By: #### CMP ####  
[address] [address]   
   
                                                    Calcium   
[Mass/Vol]      10.4 mg/dL      High            8.6 - 10.3      [facility] [place]  
Work Phone:   
[phone]  
  
   
                                                    Comment on   
above:                                  Performed By: #### CMP ####  
[address] [address]   
   
                                                    Chloride   
[Moles/Vol]     101 mmol/L      Normal          98 - 107        [facility] [place]  
Work Phone:   
[phone]  
  
   
                                                    Comment on   
above:                                  Performed By: #### CMP ####  
[address] [address]   
   
                                                    Creatinine   
[Mass/Vol]      1.24 mg/dL      Normal          0.50 - 1.30     [facility] [place] [place]  
Work Phone:   
[phone]  
   
                                                    Comment on   
above:                                  Reference Range: 0.50 - 1.30   
   
                                                            Performed By: #### C  
MP ####  
[address] [address]   
   
                                                    GFR/1.73 sq   
M.predicted   
among non-blacks   
MDRD (S/P/Bld)   
[Vol rate/Area] 63 mL/min/{1.73_m2} Normal          >90             [facility]  
   
                                                    Comment on   
above:                                  Result Comment: CALCULATIONS OF ESTIMATE  
D GFR ARE PERFORMED  
USING THE  CKD-EPI STUDY REFIT EQUATION  
WITHOUT THE RACE VARIABLE FOR THE IDMS-TRACEABLE  
CREATININE METHODS.  
https://jasn.asnjournals.org/content/early/ASN.5481980608   
   
                                                            Performed By: #### C  
MP ####  
[address] [address]   
   
                                                    Glucose   
[Mass/Vol]      163 mg/dL       High            74 - 99         [facility] [place] [place]  
Work Phone:   
[phone]  
  
   
                                                    Comment on   
above:                                  Performed By: #### CMP ####  
[address] [address]   
   
                                                    HCO3 (Bld)   
[Moles/Vol]     33 mmol/L       High            21 - 32         [facility]  
   
                                                    Comment on   
above:                                  Performed By: #### CMP ####  
[address] [address]   
   
                                                    Potassium   
[Moles/Vol]     4.2 mmol/L      Normal          3.5 - 5.3       [facility] [place] [place]  
Work Phone:   
[phone]  
  
   
                                                    Comment on   
above:                                  Performed By: #### CMP ####  
[address] [address]   
   
                                                    Protein   
[Mass/Vol]      7.3 g/dL        Normal          6.4 - 8.2       [facility] [place] [place]  
Work Phone:   
[phone]  
  
   
                                                    Comment on   
above:                                  Performed By: #### CMP ####  
[address] [address]   
   
                                                    Sodium   
[Moles/Vol]     138 mmol/L      Normal          136 - 145       [facility] [place] [place]  
Work Phone:   
[phone]  
   
                                                    Comment on   
above:                                  Performed By: #### CMP ####  
[address] [address]   
   
                                                    Urea nitrogen   
[Mass/Vol]      20 mg/dL        Normal          6 - 23          [facility] [place] [place]  
Work Phone:   
[phone]  
   
                                                    Comment on   
above:                                  Performed By: #### CMP ####  
[address] [address]   
   
                                                    Hemoglobin A1Con 2023   
   
                                                    Glucose   
[Mass/Vol]      137 mg/dL       Normal                          [facility] [place] [place]  
Work Phone:   
[phone]  
   
                                                    Comment on   
above:                                  Performed By: #### HBA1E ####  
[address] [address]   
   
                                                    HbA1c (Bld)   
[Mass fraction] 6.4 %           Abnormal                        [facility] [place] [place]  
Work Phone:   
[phone]  
   
                                                    Comment on   
above:                                  Diagnosis of Diabetes-Adults Non-Diabeti  
c: < or = 5.6% Increased risk   
for developing diabetes: 5.7-6.4% Diagnostic of diabetes: > or =   
6.5%. Monitoring of Diabetes Age (y) Therapeutic Goal (%) Adults: >18   
<7.0 Pediatrics: 13-18 <7.5 7-12 <8.0 0- 6 7.5-8.5 American Diabetes   
Association. Diabetes Care 33(S1), 2010.   
   
                                                            Result Comment: Diag  
nosis of Diabetes-Adults  
Non-Diabetic: < or = 5.6%  
Increased risk for developing diabetes: 5.7-6.4%  
Diagnostic of diabetes: > or = 6.5%  
.  
Monitoring of Diabetes  
Age (y) Therapeutic Goal (%)  
Adults: >18 <7.0  
Pediatrics: 13-18 <7.5  
7-12 <8.0  
0- 6 7.5-8.5  
American Diabetes Association. Diabetes Care 33(S1), 2010.   
   
                                                            Performed By: #### H  
BA1E ####  
[address] [address]   
   
                                                    Laboratory - Chemistry and C  
hemistry - challengeon 2023   
   
                                                    Albumin BCP dye   
[Mass/Vol]      4.4 g/dL                        3.4 - 5.0       [facility] [place] [place]  
Work Phone:   
[phone]  
   
                                                    ALT With P-5'-P   
[Catalytic   
activity/Vol]   44 U/L                          10 - 52         [facility]   
[facility]  
Work Phone:   
[phone]  
   
                                                    Comment on   
above:                                  Patients treated with Sulfasalazine may   
generate falsely decreased   
results for ALT.   
   
                                                    AST With P-5'-P   
[Catalytic   
activity/Vol]   35 U/L                          9 - 39          [facility]   
[facility]  
Work Phone:   
[phone]  
  
   
                                CO2 [Moles/Vol] 33 mmol/L       above high   
threshold                 21 - 32                   [facility]   
[facility]  
Work Phone:   
[phone]  
  
   
                                                    No Panel Informationon    
   
                                 63 {mL/min/1.73m2}            >90        [facility] [place]  
Work Phone:   
[phone]  
   
                                                    Comment on   
above:                                  CALCULATIONS OF ESTIMATED GFR ARE PERFOR  
MED USING THE  CKD-EPI   
STUDY REFIT EQUATION WITHOUT THE RACE VARIABLE FOR THE IDMS-TRACEABLE   
CREATININE   
METHODS.https://jasn.asnjournals.org/content/early//ASN.202  
5728081   
   
                                                    Vitamin B12, Serumon   
023   
   
                                                    Cobalamin   
(Vitamin B12)   
[Mass/Vol]      800 pg/mL       Normal          211 - 911       [facility]   
[facility]  
Work Phone:   
[phone]  
   
                                                    Comment on   
above:                                  Performed By: #### VTB12 ####  
[place] [address]   
   
                                                    Colonoscopyon 2022   
   
                                        Colonoscopy         PATIENTNAME  
Patient Name: Nick Cloud  
EXAMDATE  
Procedure Date: 2022   
10:00 AM  
PATIENTID  
MRN: [id number]  
PATIENTACCOUNTNUM  
Account Number: [id number]  
PATIENTDOB  
YOB: 1955  
ADMITTYPE  
Admit Type: Outpatient  
PATIENTROOM  
Site: [facility]  
ETHNICITY  
Ethnicity: Not  or   
  
RACE  
Race: White  
PROVDR  
Attending MD: Joel Adame DO, [phone]  
ENDOPROCEDURENAME  
Procedure: Colonoscopy  
INDICATION  
Indications: Surveillance:   
Personal history of colonic   
polyps  
(unknown histology) on last   
colonoscopy more than 3  
years ago  
PRIMARYPROVIDER  
Providers: Joel Adame DO   
(Doctor), Monica Mcmanus RN   
(Nurse),  
Chantell Zaldivar, Technician  
EDREFPROVIDER  
Referring: Jason Bonilla MD  
CURRENT_MEDS  
Medicines: Midazolam 5 mg IV,   
Meperidine 50 mg IV  
COMPLIC  
Complications: No immediate   
complications.  
ENDOPROCEDURETEXT  
Procedure: Pre-Anesthesia   
Assessment:  
- Prior to the procedure, a   
History and Physical was  
performed, and patient   
medications and allergies were  
reviewed. The patient is   
competent. The risks and  
benefits of the procedure and   
the sedation options and  
risks were discussed with the   
patient. All questions  
were answered and informed   
consent was obtained.  
Patient identification and   
proposed procedure were  
verified by the physician in   
the pre-procedure area.  
Mental Status Examination:   
alert and oriented. Airway  
Examination: normal   
oropharyngeal airway and neck  
mobility. Respiratory   
Examination: clear to  
auscultation. CV Examination:   
normal. Prophylactic  
Antibiotics: The patient does   
not require prophylactic  
antibiotics. Prior   
Anticoagulants: The patient   
has  
taken no anticoagulant or   
antiplatelet agents. ASA  
Grade Assessment: II - A   
patient with mild systemic  
disease. After reviewing the   
risks and benefits, the  
patient was deemed in   
satisfactory condition to  
undergo the procedure. The   
anesthesia plan was to use  
moderate sedation / analgesia   
(conscious sedation).  
Immediately prior to   
administration of medications,  
the patient was re-assessed   
for adequacy to receive  
sedatives. The heart rate,   
respiratory rate, oxygen  
saturations, blood pressure,   
adequacy of pulmonary  
ventilation, and response to   
care were monitored  
throughout the procedure. The   
physical status of the  
patient was re-assessed after   
the procedure.  
After I obtained informed   
consent, the scope was  
passed under direct vision.   
Throughout the procedure,  
the patient's blood pressure,   
pulse, and oxygen  
saturations were monitored   
continuously. The adult  
colonoscope was introduced   
through the anus and  
advanced to the cecum,   
identified by appendiceal  
orifice and ileocecal valve.   
The colonoscopy was  
performed without difficulty.   
The patient tolerated  
the procedure well. The   
quality of the bowel  
preparation was excellent. The   
ileocecal valve,  
appendiceal orifice, and   
rectum were photographed.  
FINDING  
Findings:  
The perianal and digital   
rectal examinations were   
normal. Pertinent  
negatives include normal   
sphincter tone and no palpable   
rectal lesions.  
Multiple small and   
large-mouthed diverticula were   
found in the sigmoid  
colon.  
The exam was otherwise without   
abnormality on direct and   
retroflexion  
views.  
SEDATION  
Moderate Sedation:  
Moderate (conscious) sedation   
was administered by the nurse   
and  
supervised by the endoscopist.   
The following parameters were   
monitored:  
oxygen saturation, heart rate,   
blood pressure, and response   
to care.  
Total physician intraservice   
time was 22 minutes.  
EBL  
Estimated Blood Loss:  
Estimated blood loss: none.  
IMPRESS  
Impression: - Diverticulosis   
in the sigmoid colon.  
- The examination was   
otherwise normal on direct and  
retroflexion views.  
- No specimens collected.  
ENDORECOMMENDATION  
Recommendation: - Patient has   
a contact number available for  
emergencies. The signs and   
symptoms of potential  
delayed complications were   
discussed with the patient.  
Return to normal activities   
tomorrow. Written  
discharge instructions were   
provided to the patient.  
- Resume previous diet.  
- Continue present   
medications.  
- Repeat colonoscopy in 3   
years for screening purposes.  
CPT_CODES  
Procedure Code(s): ---   
Professional ---  
, Colorectal cancer   
screening; colonoscopy on  
individual at high risk  
, Moderate sedation   
services provided by the same  
physician or other qualified   
health care professional  
performing a gastrointestinal   
endoscopic service that  
sedation supports, requiring   
the presence of an  
independent trained observer   
to assist in the  
monitoring of the patient's   
level of consciousness and  
physiological status; initial   
15 minutes of  
intra-service time; patient   
age 5 years or older  
(additional time may be   
reported with 54002, as  
appropriate)  
ICD_CODES  
Diagnosis Code(s): ---   
Professional ---  
K57.30, Diverticulosis of   
large intestine without  
perforation or abscess without   
bleeding  
Z86.010, Pers (more content   
not included)...    Normal                                  [facility]  
   
                                                    GLUCOSE-POCTon 2022   
   
                                                    Glucose   
[Mass/Vol]      130 mg/dL       High            74 - 99         [facility]  
   
                                                    Comment on   
above:                                  Performed By: #### GLUPO ####  
[facility]  
[address] [place] [address]   
   
                                                    Laboratory - Chemistry and C  
hemistry - challengeon 2022   
   
                                                    Glucose   
[Mass/Vol]                130 mg/dL                 above high   
threshold                 74 - 99                   [facility]   
[facility]  
Work Phone:   
[phone]  
   
                                                    No Panel Informationon    
   
                                                            [email or web address]  
[email or web address]  
[email or web address]  
[email or web address]                                         [facility]   
[facility]  
Work Phone:   
[phone]  
   
                                                    ECG 12 leadon 2022   
   
                      Atrial Rate                                  [facility]  
   
                      P Axis                                      [facility]  
   
                      P-R Interval                                  [facility]  
   
                      Q-T Interval                                  [facility]  
   
                                                    Q-T Interval   
(corrected)                                                     [facility]  
   
                      QRS Duration                                  [facility]  
   
                                                    QTC Calculation   
(Bezet)                                                         [facility]  
   
                      R Axis                                      [facility]  
   
                      T Axis                                      [facility]  
   
                      Ventricular Rate                                  [facility] Annual Wellness Vis  
iton 2022   
   
                                                    Medicare Annual   
Wellness Visit                          *Chief Complaint  
6MO FU REV LABS(DONE)  
  
**History of Present Illness  
The patient is being seen for   
the subsequent annual wellness   
visit.  
Past Medical, Surgical and   
Family History: reviewed and   
updated in chart.  
Medications and Supplements:   
Review of all medications by a   
prescribing practitioner or   
clinical pharmacist (such as   
prescriptions, OTCs, herbal   
therapies and supplements)   
documented in the medical   
record.  
No, the patient is not using   
opioids.  
Patient Self Assessment of   
Health Status: good.  
Tobacco use: Non-User  
Alcohol use: User  
Illicit drug use: Non-User  
Current diet: Diabetic Diet,   
does not consume adequate   
fluids and does consume   
caffeine.  
Exercise Frequency:   
infrequently.  
Depression/Suicide Screening:   
.  
During the past 2 weeks, the   
patient has not felt down,   
depressed or hopeless.  
During the past 2 weeks, the   
patient has not felt little   
interest or pleasure in doing   
things.  
Hearing Impairment: Patient   
has significant hearing   
impairment, bilaterally, He   
uses a hearing aid.  
Cognitive Impairment: No   
cognitive impairment observed.  
Bathing: performs   
independently.  
Dressing: performs   
independently.  
Walking: performs   
independently.  
Managing Finances: performs   
independently.  
Shopping: performs   
independently.  
Managing Medications: performs   
independently.  
Housework / Basic Home   
Maintenance: performs   
independently.  
Falls Risk Screening:. NICK   
has not fallen in the last 6   
months.  
Home safety risk factors:   
none.  
Advance directives:. Advance   
Care Planning discussed and   
documented in the medical   
record, patient did not wish   
or was not able to name a   
surrogate decision maker or   
provide an advance care plan.   
Patient has living will.   
Patient has no healthcare POA.  
Had another large flank tattoo   
on the lower left leg. Has a   
wart below it. But developed   
multiple raised   
keratotic/verrucous lesions   
throughout the tattoo. Has   
been since March.  
Also he has likely seborrheic   
keratosis on the left arm   
multiple seborrheic keratosis   
on the scalp.  
Referral to dermatology [facility] [address]. Nitin Gilmore.  
Hypertension - Takes   
medication without side   
effects. No   
CP/SOB/Palpitations. Follows a   
no added salt diet. Counseled   
diet, activity and weight   
loss.  
Hyperlipidemia - Takes and   
tolerates medications without   
fatigue and myalgias.  
Diabetes Type II - Takes and   
tolerates medications. No   
hypoglycemia. Counseled on   
diet with low carbohydrate   
intake, weight loss and   
increased activity   
levels/exercise.  
afib sees cardio  
When he stands he does not get   
any increased back pain hip   
pain or knee pain, but he gets   
numbness in his left foot.   
When he sits down the numbness   
goes away. Has occasional low   
back pain in the past but that   
is not bothering him more now.  
So in the past he would   
occasionally get some numbness   
in the upper part of the left   
leg. That is not worse or   
bothering him at this time.  
Question source of the   
numbness,? EMG and x-ray,?   
Referral orthopedics. No   
work-up at this time. If   
things get worse he will call   
sooner. Add B12 to next lab.  
Recommend flu shot and COVID   
booster in the fall, recommend   
pneumonia shots shingles shot  
Thomae over the call to   
schedule him for follow-up   
colonoscopy this year.  
  
Review of Systems  
Constitutional: No weakness,   
No fatigue.  
Eye: No blurring, No visual   
disturbances.  
Respiratory: No shortness of   
breath, No cough.  
Cardiovascular: No chest pain,   
No palpitations.  
Gastrointestinal: No nausea,   
No diarrhea, No constipation.  
Musculoskeletal: No joint   
pain, No muscle pain.  
Integumentary: No rash, No   
breakdown.  
Neurologic: Alert and oriented   
X4, No headache.  
Psychiatric: No irritability,   
No sleeping problems.  
  
*Active Problems  
Controlled type 2 diabetes   
mellitus without complication,   
without long-term current use  
of insulin (250.00) (E11.9)  
Controlled type 2 diabetes   
mellitus without complication,   
without long-term current use  
of insulin  
Diverticulosis of colon   
(562.10) (K57.30)  
ED (erectile dysfunction) of   
organic origin (607.84)   
(N52.9)  
Fatigue (780.79) (R53.83)  
Fever (780.60) (R50.9)  
Gastroesophageal reflux   
disease without esophagitis   
(530.81) (K21.9)  
Hematuria (599.70) (R31.9)  
Hypertension, essential   
(401.9) (I10)  
Knee pain (719.46) (M25.569)  
RIGHT  
Mixed hyperlipidemia (272.2)   
(E78.2)  
Nocturia (788.43) (R35.1)  
Osteoarthritis of right knee   
(715.96) (M17.11)  
Paroxysmal atrial fibrillation   
(427.31) (I48.0)  
Recurrent kidney stones   
(592.0) (N20.0)  
Retroperitoneal bleed (459.0)   
(R58)  
Right knee pain (719.46)   
(M25.561)  
Surgical History  
History of Appendectomy  
History of Colonoscopy  
thomae, 3 years  
History of Foot surgery  
History of Tonsillectomy  
Family History  
No pertinent family history  
No pertinent family history  
Social History  
Consumes alcohol occasionally   
(V49.89) (Z78.9)  
Non-smoker (V49.89) (Z78.9)  
Patient has healthcare proxy   
and living will (V49.89)   
(Z78.9)  
*Allergies  
No Known Drug Allergies  
Recorded By: Yadira (more   
content not included)... Normal                                     
[employer]  
   
                                                    Tobacco Screening.on   
022   
   
                                                    Adult depression   
screening   
assessment      No                                              [email or web address] [facility]  
Work Phone:   
[phone]  
   
                                                    Fall risk   
assessment                              a) No falls within the last   
year                                                        [facility]   
[facility]  
Work Phone:   
[phone]  
   
                                                    Tobacco use   
status [facility]     b) No                                           [email or web address] [facility]  
Work Phone:   
[phone]  
   
                                                    CBCon 2022   
   
                                                    Erythrocyte   
distribution   
width (RBC)   
[Ratio]         13.9 %          Normal          11.5 - 14.5     [facility]  
   
                                                    Comment on   
above:                                  Performed By: #### CBC ####  
[address] [address]   
   
                                                    Hematocrit (Bld)   
[Volume   
fraction]       42.9 %          Normal          41.0 - 52.0     [facility]  
   
                                                    Comment on   
above:                                  Performed By: #### CBC ####  
[address] [address]   
   
                                                    Hemoglobin (Bld)   
[Mass/Vol]      15.4 g/dL       Normal          13.5 - 17.5     [facility]  
   
                                                    Comment on   
above:                                  Performed By: #### CBC ####  
[address] [address]   
   
                                                    MCHC (RBC)   
[Mass/Vol]      35.9 g/dL       Normal          32.0 - 36.0     [facility]  
   
                                                    Comment on   
above:                                  Performed By: #### CBC ####  
[address] [address]   
   
                                                    MCV (RBC)   
[Entitic vol]   97 fL           Normal          80 - 100        [facility]  
   
                                                    Comment on   
above:                                  Performed By: #### CBC ####  
[address] [address]   
   
                                                    Platelets (Bld)   
[#/Vol]         168 10*3/uL     Normal          150 - 450       [facility]  
   
                                                    Comment on   
above:                                  Performed By: #### CBC ####  
[address] [address]   
   
                      RBC        4.40 x10E12/L Low        4.50 - 5.90 [facility]  
   
                                                    Comment on   
above:                                  Performed By: #### CBC ####  
[address] [address]   
   
                                                    WBC (Bld)   
[#/Vol]         6.1 10*3/uL     Normal          4.4 - 11.3      [facility]  
   
                                                    Comment on   
above:                                  Performed By: #### CBC ####  
[place] [id number]   
   
                                                    COMPREHENSIVE PANELon 2022   
   
                                                    Albumin   
[Mass/Vol]      4.1 g/dL        Normal          3.4 - 5.0       [facility]  
   
                                                    Comment on   
above:                                  Performed By: #### CMP ####  
[address] [address]   
   
                                                    ALP [Catalytic   
activity/Vol]   51 U/L          Normal          33 - 136        [facility]  
   
                                                    Comment on   
above:                                  Performed By: #### CMP ####  
[address] [address]   
   
                                                    ALT [Catalytic   
activity/Vol]   25 U/L          Normal          10 - 52         [facility]  
   
                                                    Comment on   
above:                                  Result Comment: Patients treated with Macdonald  
lfasalazine may generate  
falsely decreased results for ALT.   
   
                                                            Performed By: #### C  
MP ####  
[address] [address]   
   
                                                    Anion gap   
[Moles/Vol]     11 mmol/L       Normal          10 - 20         [facility]  
   
                                                    Comment on   
above:                                  Performed By: #### CMP ####  
[place] [id number]   
   
                                                    AST [Catalytic   
activity/Vol]   23 U/L          Normal          9 - 39          [facility]  
   
                                                    Comment on   
above:                                  Performed By: #### CMP ####  
[address] [address]   
   
                                                    Bilirubin   
[Mass/Vol]      1.1 mg/dL       Normal          0.0 - 1.2       [facility]  
   
                                                    Comment on   
above:                                  Performed By: #### CMP ####  
[address] [address]   
   
                                                    Calcium   
[Mass/Vol]      9.6 mg/dL       Normal          8.6 - 10.3      [facility]  
   
                                                    Comment on   
above:                                  Performed By: #### CMP ####  
[address] [address]   
   
                                                    Chloride   
[Moles/Vol]     104 mmol/L      Normal          98 - 107        [facility]  
   
                                                    Comment on   
above:                                  Performed By: #### CMP ####  
[address] [address]   
   
                                                    Creatinine   
[Mass/Vol]      1.09 mg/dL      Normal          0.50 - 1.30     [facility]  
   
                                                    Comment on   
above:                                  Performed By: #### CMP ####  
[address] [address]   
   
                                                    GFR/1.73 sq   
M.predicted   
among non-blacks   
MDRD (S/P/Bld)   
[Vol rate/Area] 74 mL/min/{1.73_m2} Normal          >90             [facility]  
   
                                                    Comment on   
above:                                  Result Comment: CALCULATIONS OF ESTIMATE  
D GFR ARE PERFORMED  
USING THE  CKD-EPI STUDY REFIT EQUATION  
WITHOUT THE RACE VARIABLE FOR THE IDMS-TRACEABLE  
CREATININE METHODS.  
https://jasn.asnjournals.org/content/early//ASN.3358016137   
   
                                                            Performed By: #### C  
MP ####  
[address] [address]   
   
                                                    Glucose   
[Mass/Vol]      150 mg/dL       High            74 - 99         [facility]  
   
                                                    Comment on   
above:                                  Performed By: #### CMP ####  
[address] [address]   
   
                                                    HCO3 (Bld)   
[Moles/Vol]     31 mmol/L       Normal          21 - 32         [facility]  
   
                                                    Comment on   
above:                                  Performed By: #### CMP ####  
[address] [address]   
   
                                                    Potassium   
[Moles/Vol]     4.6 mmol/L      Normal          3.5 - 5.3       [facility]  
   
                                                    Comment on   
above:                                  Performed By: #### CMP ####  
[address] [address]   
   
                                                    Protein   
[Mass/Vol]      6.6 g/dL        Normal          6.4 - 8.2       [facility]  
   
                                                    Comment on   
above:                                  Performed By: #### CMP ####  
[address] [address]   
   
                                                    Sodium   
[Moles/Vol]     141 mmol/L      Normal          136 - 145       [facility]  
   
                                                    Comment on   
above:                                  Performed By: #### CMP ####  
[address] [address]   
   
                                                    Urea nitrogen   
[Mass/Vol]      22 mg/dL        Normal          6 - 23          [facility]  
   
                                                    Comment on   
above:                                  Performed By: #### CMP ####  
[address] [address]   
   
                                                    HEMOGLOBIN A1Con 2022   
   
                                                    Glucose   
[Mass/Vol]      131 mg/dL       Normal                          [facility]  
   
                                                    Comment on   
above:                                  Performed By: #### HBA1E ####  
[address] [address]   
   
                                                    HbA1c (Bld)   
[Mass fraction] 6.2 %           Abnormal                        [facility]  
   
                                                    Comment on   
above:                                  Result Comment: Diagnosis of Diabetes-Ad  
ults  
Non-Diabetic: < or = 5.6%  
Increased risk for developing diabetes: 5.7-6.4%  
Diagnostic of diabetes: > or = 6.5%  
.  
Monitoring of Diabetes  
Age (y) Therapeutic Goal (%)  
Adults: >18 <7.0  
Pediatrics: 13-18 <7.5  
7-12 <8.0  
0- 6 7.5-8.5  
American Diabetes Association. Diabetes Care 33(S1), 2010.   
   
                                                            Performed By: #### H  
BA1E ####  
[address] [address]   
   
                                                    Hemoglobin A1Con 2022   
   
                                                    Glucose   
[Mass/Vol]      131 mg/dL                                       [facility]   
[facility]  
Work Phone:   
[phone]  
   
                                                    HbA1c (Bld)   
[Mass fraction] 6.2 %           Abnormal                        [facility]   
[facility]  
Work Phone:   
[phone]  
   
                                                    Comment on   
above:                                  Diagnosis of Diabetes-Adults Non-Diabeti  
c: < or = 5.6% Increased risk   
for developing diabetes: 5.7-6.4% Diagnostic of diabetes: > or =   
6.5%. Monitoring of Diabetes Age (y) Therapeutic Goal (%) Adults: >18   
<7.0 Pediatrics: 13-18 <7.5 7-12 <8.0 0- 6 7.5-8.5 American Diabetes   
Association. Diabetes Care 33(S1), 2010.   
   
                                                    LIPID PANEL (CORONARY RISK 2  
)on 2022   
   
                                                    Cholesterol   
[Mass/Vol]      127 mg/dL       Normal          0 - 199         [facility]  
   
                                                    Comment on   
above:                                  Result Comment: .  
AGE DESIRABLE BORDERLINE HIGH HIGH  
0-19 Y 0 - 169 170 - 199 >/= 200  
20-24 Y 0 - 189 190 - 224 >/= 225  
>24 Y 0 - 199 200 - 239 >/= 240  
**All ranges are based on fasting samples. Specific  
therapeutic targets will vary based on patient-specific  
cardiac risk.  
.  
Pediatric guidelines reference:Pediatrics 2011, 128(S5).  
Adult guidelines reference: NCEP ATPIII Guidelines,  
BIANCA 2001, 258:2486-97  
.  
Venipuncture immediately after or during the  
administration of Metamizole may lead to falsely  
low results. Testing should be performed immediately  
prior to Metamizole dosing.   
   
                                                            Performed By: #### L  
IPID ####  
[address] [address]   
   
                                                    Cholesterol in   
HDL [Mass/Vol]  44.0 mg/dL      Normal                          [facility]  
   
                                                    Comment on   
above:                                  Result Comment: .  
AGE VERY LOW LOW NORMAL HIGH  
0-19 Y < 35 < 40 40-45 ----  
20-24 Y ---- < 40 >45 ----  
>24 Y ---- < 40 40-60 >60  
.   
   
                                                            Performed By: #### L  
IPID ####  
[address] [address]   
   
                                                    Cholesterol in   
LDL [Mass/Vol]  59 mg/dL        Normal          0 - 99          [facility]  
   
                                                    Comment on   
above:                                  Result Comment: .  
NEAR BORD  
AGE DESIRABLE OPTIMAL HIGH HIGH VERY HIGH  
0-19 Y 0 - 109 --- 110-129 >/= 130 ----  
20-24 Y 0 - 119 --- 120-159 >/= 160 ----  
>24 Y 0 - 99 100-129 130-159 160-189 >/=190  
.   
   
                                                            Performed By: #### L  
IPID ####  
[address] [address]   
   
                                                    Cholesterol in   
VLDL [Mass/Vol] 24 mg/dL        Normal          0 - 40          [facility]  
   
                                                    Comment on   
above:                                  Performed By: #### LIPID ####  
[address] [address]   
   
                                                    Cholesterol.tota  
l/Cholesterol in   
HDL [Mass ratio] 2.9 {ratio}     Normal                          [facility]  
   
                                                    Comment on   
above:                                  Result Comment: REF VALUES  
DESIRABLE < 3.4  
HIGH RISK > 5.0   
   
                                                            Performed By: #### L  
IPID ####  
[address] [address]   
   
                                                    Triglyceride   
[Mass/Vol]      118 mg/dL       Normal          0 - 149         [facility]  
   
                                                    Comment on   
above:                                  Result Comment: .  
AGE DESIRABLE BORDERLINE HIGH HIGH VERY HIGH  
0 D-90 D 19 - 174 ---- ---- ----  
91 D- 9 Y 0 - 74 75 - 99 >/= 100 ----  
10-19 Y 0 - 89 90 - 129 >/= 130 ----  
20-24 Y 0 - 114 115 - 149 >/= 150 ----  
>24 Y 0 - 149 150 - 199 200- 499 >/= 500  
.  
Venipuncture immediately after or during the  
administration of Metamizole may lead to falsely  
low results. Testing should be performed immediately  
prior to Metamizole dosing.   
   
                                                            Performed By: #### L  
IPID ####  
[address] [address]   
   
                                                    Laboratory - Chemistry and C  
hemistry - challengeon 2022   
   
                                                    Albumin BCP dye   
[Mass/Vol]      4.1 g/dL                        3.4 - 5.0       [facility]  
Work Phone:   
[phone]  
21  
   
                                                    ALP [Catalytic   
activity/Vol]   51 U/L                          33 - 136        [facility]  
Work Phone:   
[phone]  
21  
   
                                                    ALT With P-5'-P   
[Catalytic   
activity/Vol]   25 U/L                          10 - 52         [facility]   
[facility]  
Work Phone:   
[phone]  
  
   
                                                    Comment on   
above:                                  Patients treated with Sulfasalazine may   
generate falsely decreased   
results for ALT.   
   
                                                    Anion gap   
[Moles/Vol]     11 mmol/L                       10 - 20         [facility]   
[facility]  
Work Phone:   
[phone]  
21  
   
                                                    AST With P-5'-P   
[Catalytic   
activity/Vol]   23 U/L                          9 - 39          [facility]   
[facility]  
Work Phone:   
[phone]  
21  
   
                                                    Bilirubin   
[Mass/Vol]      1.1 mg/dL                       0.0 - 1.2       [facility]   
[facility]  
Work Phone:   
[phone]  
21  
   
                                                    Calcium   
[Mass/Vol]      9.6 mg/dL                       8.6 - 10.3      [facility]   
[facility]  
Work Phone:   
[phone]  
21  
   
                                                    Chloride   
[Moles/Vol]     104 mmol/L                      98 - 107        [facility]   
[facility]  
Work Phone:   
[phone]  
21  
   
                      CO2 [Moles/Vol] 31 mmol/L             21 - 32    [facility]   
[facility]   
[facility]  
Work Phone:   
[phone]  
21  
   
                                                    Creatinine   
[Mass/Vol]      1.09 mg/dL                      See Below       [facility]   
[facility]   
[facility]  
Work Phone:   
[phone]  
  
   
                                                    Comment on   
above:                                  Reference Range: 0.50 - 1.30   
   
                                                    Glucose   
[Mass/Vol]                150 mg/dL                 above high   
threshold                 74 - 99                   [facility]   
[facility]  
Work Phone:   
[phone]  
21  
   
                                                    Potassium   
[Moles/Vol]     4.6 mmol/L                      3.5 - 5.3       [facility]   
[facility]  
Work Phone:   
[phone]  
21  
   
                                                    Protein   
[Mass/Vol]      6.6 g/dL                        6.4 - 8.2       [facility]   
[facility]  
Work Phone:   
[phone]  
21  
   
                                                    Sodium   
[Moles/Vol]     141 mmol/L                      136 - 145       [facility]   
[facility]  
Work Phone:   
[phone]  
21  
   
                                                    Urea nitrogen   
[Mass/Vol]      22 mg/dL                        6 - 23          [facility]   
[facility]  
Work Phone:   
[phone]  
21  
   
                                                    Laboratory - Hematology and   
Cell countson 2022   
   
                                                    Erythrocyte   
distribution   
width (RBC)   
[Ratio]         13.9 %                          See Below       [facility]   
[facility]  
Work Phone:   
[phone]  
  
   
                                                    Comment on   
above:                                  Reference Range: 11.5 - 14.5   
   
                                                    Hematocrit (Bld)   
[Volume   
fraction]       42.9 %                          See Below       [facility]   
[facility]  
Work Phone:   
[phone]  
  
   
                                                    Comment on   
above:                                  Reference Range: 41.0 - 52.0   
   
                                                    Hemoglobin (Bld)   
[Mass/Vol]      15.4 g/dL                       See Below       [facility]   
[facility]  
Work Phone:   
[phone]  
  
   
                                                    Comment on   
above:                                  Reference Range: 13.5 - 17.5   
   
                                                    MCHC (RBC)   
[Mass/Vol]      35.9 g/dL                       See Below       [facility]   
[facility]  
Work Phone:   
[phone]  
  
   
                                                    Comment on   
above:                                  Reference Range: 32.0 - 36.0   
   
                                                    MCV (RBC)   
[Entitic vol]   97 fL                           80 - 100        [facility]   
[facility]  
Work Phone:   
[phone]  
  
   
                                                    Platelets (Bld)   
[#/Vol]         168 10*3/uL                     150 - 450       [facility]   
[facility]  
Work Phone:   
[phone]  
  
   
                                                    RBC (Bld)   
[#/Vol]                   4.40 {x10E12/L}           below low   
threshold                 See Below                 [facility]   
[facility]  
Work Phone:   
[phone]  
  
   
                                                    Comment on   
above:                                  Reference Range: 4.50 - 5.90   
   
                                                    WBC (Bld)   
[#/Vol]         6.1 10*3/uL                     4.4 - 11.3      [facility]   
[facility]  
Work Phone:   
[phone]  
  
   
                                                    Lipid Panelon 2022   
   
                                                    Cholesterol   
[Mass/Vol]      127 mg/dL                       0 - 199         [facility]   
[facility]  
Work Phone:   
[phone]  
  
   
                                                    Comment on   
above:                                  . AGE DESIRABLE BORDERLINE HIGH HIGH 0-1  
9 Y 0 - 169 170 - 199 >/= 200   
20-24 Y 0 - 189 190 - 224 >/= 225 >24 Y 0 - 199 200 - 239 >/= 240   
**All ranges are based on fasting samples. Specific therapeutic   
targets will vary based on patient-specific cardiac risk.. Pediatric   
guidelines reference:Pediatrics 2011, 128(S5). Adult guidelines   
reference: NCEP ATPIII Guidelines, BIANCA 2001, 258:2486-97.   
Venipuncture immediately after or during the administration of   
Metamizole may lead to falsely low results. Testing should be   
performed immediately prior to Metamizole dosing.   
   
                                                    Cholesterol in   
HDL [Mass/Vol]  44.0 mg/dL                                      [facility]   
[facility]  
Work Phone:   
[phone]  
   
                                                    Comment on   
above:                                  . AGE VERY LOW LOW NORMAL HIGH 0-19 Y <   
35 < 40 40-45 ---- 20-24 Y   
---- < 40 >45 ---- >24 Y ---- < 40 40-60 >60.   
   
                                                    Cholesterol in   
LDL [Mass/Vol]  59 mg/dL                        0 - 99          [facility]   
[facility]  
Work Phone:   
[phone]  
   
                                                    Comment on   
above:                                  . NEAR BORD AGE DESIRABLE OPTIMAL HIGH H  
IGH VERY HIGH 0-19 Y 0 - 109   
--- 110-129 >/= 130 ---- 20-24 Y 0 - 119 --- 120-159 >/= 160 ---- >24   
Y 0 - 99 100-129 130-159 160-189 >/=190.   
   
                                                    Cholesterol.tota  
l/Cholesterol in   
HDL [Mass ratio] 2.9 {ratio}                                     [facility]   
[facility]  
Work Phone:   
[phone]  
   
                                                    Comment on   
above:                                  REF VALUESDESIRABLE < 3.4HIGH RISK > 5.0  
   
   
                                                    Triglyceride   
[Mass/Vol]      118 mg/dL                       0 - 149         [facility]   
[facility]  
Work Phone:   
[phone]  
  
   
                                                    Comment on   
above:                                  . AGE DESIRABLE BORDERLINE HIGH HIGH CANDY  
Y HIGH 0 D-90 D 19 - 174 ----   
---- ----91 D- 9 Y 0 - 74 75 - 99 >/= 100 ---- 10-19 Y 0 - 89 90 -   
129 >/= 130 ---- 20-24 Y 0 - 114 115 - 149 >/= 150 ---- >24 Y 0 - 149   
150 - 199 200- 499 >/= 500. Venipuncture immediately after or during   
the administration of Metamizole may lead to falsely low results.   
Testing should be performed immediately prior to Metamizole dosing.   
   
                      Lipid Panel 24 mg/dL              0 - 40     [facility]   
[facility]  
Work Phone:   
[phone]  
21  
   
                                                    No Panel Informationon    
   
                                 74 {mL/min/1.73m2}            >90        -Med  
[facility]  
Work Phone:   
[phone]  
   
                                                    Comment on   
above:                                  CALCULATIONS OF ESTIMATED GFR ARE PERFOR  
MED USING THE  CKD-EPI   
STUDY REFIT EQUATION WITHOUT THE RACE VARIABLE FOR THE IDMS-TRACEABLE   
CREATININE   
METHODS.https://jasn.asnjournals.org/content/early//ASN.202  
7865468   
   
                                                    PROSTATE SPECIFIC AGon    
   
                                                    Prostate   
specific Ag   
[Mass/Vol]      0.88 ng/mL      Normal          0.00 - 4.00     [facility]  
   
                                                    Comment on   
above:                                  Result Comment: The FDA requires that th  
e method used for PSA assay   
be  
reported to the physician. Values obtained with different  
assay methods must not be used interchangeably. This test  
was performed at [facility] using the Access  
Hybritech PSA assay is a two-site immunoenzymatic sandwich  
assay. The assay is approved for measurement of  
prostate-specific antigen (PSA)in serum and may be used  
in conjunction with a digital rectal examination in men  
50 years and older as an aid in detection of prostate  
cancer.  
5-Alpha-reductase inhibitors (e.g. Proscar, Finasteride,  
Avodart, Dutasteride and Teresa) for the treatment of BPH  
have been shown to lower PSA levels by an average of 50%  
after 6 months of treatment.   
   
                                                            Performed By: #### P  
 ####  
[place] [address]   
   
                                                    Prostate Specific Antigenon   
2022   
   
                                                    Prostate   
specific Ag   
[Mass/Vol]      0.88 ng/mL                      See Below       [facility]  
Work Phone:   
[phone]  
   
                                                    Comment on   
above:                                  Reference Range: 0.00 - 4.00The FDA requ  
ires that the method used for   
PSA assay be reported to the physician. Values obtained with   
different assay methods must not be used interchangeably. This   
testwas performed at [facility] using the Access   
Hybritech PSA assay is a two-site immunoenzymatic sandwich assay. The   
assay is approved for measurement of prostate-specific antigen   
(PSA)in serum and may be used in conjunction with a digital rectal   
examination in men 50 years and older as an aid in detection of   
prostate cancer.5-Alpha-reductase inhibitors (e.g. Proscar,   
Finasteride, Avodart, Dutasteride and Teresa) for the treatment of BPH   
have been shown to lower PSA levels by an average of 50% after 6   
months of treatment.   
   
                                                    TSHon 2022   
   
                      TSH Qn     3.01 m[IU]/L Normal     0.44 - 3.98 [facility]  
   
                                                    Comment on   
above:                                  Result Comment: TSH testing is performed  
 using different testing  
methodology at [facility] than at other  
[facility]. Direct result comparisons should  
only be made within the same method.   
   
                                                            Performed By: #### T  
SH2 ####  
[address] [place] [address]   
   
                                                    TSH - Thyroid Stimulating Ho  
rmone, Serumon 2022   
   
                      TSH Qn     3.01 m[IU]/L            See Below  [facility]   
[facility]  
Work Phone:   
[phone]  
   
                                                    Comment on   
above:                                  Reference Range: 0.44 - 3.98 TSH testing  
 is performed using different   
testing methodology at [facility] than at other [facility]. Direct result comparisons should only be made within the   
same method.   
   
                                                    Tobacco Screening.on   
022   
   
                                                    Fall risk   
assessment                              a) No falls within the last   
year                                                        [facility]   
[facility]  
Work Phone:   
[phone]  
   
                                                    Tobacco use   
status [facility]     b) No                                           [facility]   
[facility]  
Work Phone:   
[phone]  
   
                                                    ECG 12 [place] 2021   
   
                      Atrial Rate                                  [facility]  
   
                      P Axis                                      [facility]  
   
                      P-R Interval                                  [facility]  
   
                      Q-T Interval                                  [facility]  
   
                                                    Q-T Interval   
(corrected)                                                     [facility]  
   
                      QRS Duration                                  [facility]  
   
                                                    QTC Calculation   
(Bezet)                                                         [facility]  
   
                      R Axis                                      [facility]  
   
                      T Axis                                      [facility]  
   
                      Ventricular Rate                                  [facility]  
   
                                                    Tobacco Screening.on   
021   
   
                                                    Fall risk   
assessment                              a) No falls within the last   
year                                                        [facility]   
[facility]  
Work Phone:   
[phone]  
   
                                                    Tobacco use   
status [facility]     b) No                                           [facility]   
[facility]  
Work Phone:   
[phone]  
   
                                                    Hemoglobin A1Con 2021   
   
                                                    Glucose   
[Mass/Vol]      117 mg/dL                                       [facility]   
[facility]  
Work Phone:   
[phone]  
  
   
                                                    HbA1c (Bld)   
[Mass fraction] 5.7 %                                           [facility]   
[facility]  
Work Phone:   
[phone]  
  
   
                                                    Comment on   
above:                                  Diagnosis of Diabetes-Adults Non-Diabeti  
c: < or = 5.6% Increased risk   
for developing diabetes: 5.7-6.4% Diagnostic of diabetes: > or =   
6.5%. Monitoring of Diabetes Age (y) Therapeutic Goal (%) Adults: >18   
<7.0 Pediatrics: 13-18 <7.5 7-12 <8.0 0- 6 7.5-8.5 American Diabetes   
Association. Diabetes Care 33(S1), 2010.   
   
                                                    Laboratory - Chemistry and C  
hemistry - challengeon 2021   
   
                                                    Albumin BCP dye   
[Mass/Vol]      3.9 g/dL                        3.4 - 5.0       [facility]   
[facility]  
Work Phone:   
[phone]  
  
   
                                                    ALP [Catalytic   
activity/Vol]   63 U/L                          33 - 136        [facility]   
[facility]  
Work Phone:   
[phone]  
21  
   
                                                    ALT With P-5'-P   
[Catalytic   
activity/Vol]   22 U/L                          10 - 52         [facility]   
[facility]  
Work Phone:   
[phone]  
  
   
                                                    Comment on   
above:                                  Patients treated with Sulfasalazine may   
generate falsely decreased   
results for ALT.   
   
                                                    Anion gap   
[Moles/Vol]               9 mmol/L                  below low   
threshold                 10 - 20                   [facility]   
[facility]  
Work Phone:   
[phone]  
21  
   
                                                    AST With P-5'-P   
[Catalytic   
activity/Vol]   22 U/L                          9 - 39          [facility]   
[facility]  
Work Phone:   
[phone]  
21  
   
                                                    Bilirubin   
[Mass/Vol]      1.0 mg/dL                       0.0 - 1.2       [facility]   
[facility]  
Work Phone:   
[phone]  
21  
   
                                                    Calcium   
[Mass/Vol]      9.8 mg/dL                       8.6 - 10.3      [facility]   
[facility]  
Work Phone:   
[phone]  
21  
   
                                                    Chloride   
[Moles/Vol]     105 mmol/L                      98 - 107        [facility]   
[facility]  
Work Phone:   
[phone]  
21  
   
                      CO2 [Moles/Vol] 31 mmol/L             21 - 32    [facility]   
[facility]   
[facility]  
Work Phone:   
[phone]  
  
   
                                                    Creatinine   
[Mass/Vol]      0.94 mg/dL                      See Below       [facility]   
[facility]  
Work Phone:   
[phone]  
  
   
                                                    Comment on   
above:                                  Reference Range: 0.50 - 1.30   
   
                                                    Glucose   
[Mass/Vol]                115 mg/dL                 above high   
threshold                 74 - 99                   [facility]   
[facility]   
[facility]  
Work Phone:   
[phone]  
  
   
                                                    Potassium   
[Moles/Vol]     4.1 mmol/L                      3.5 - 5.3       [facility]   
[facility]   
[facility]  
Work Phone:   
[phone]  
  
   
                                                    Protein   
[Mass/Vol]      6.5 g/dL                        6.4 - 8.2       [facility]   
[facility]  
Work Phone:   
[phone]  
  
   
                                                    Sodium   
[Moles/Vol]     141 mmol/L                      136 - 145       [facility]   
[facility]  
Work Phone:   
[phone]  
  
   
                                                    Urea nitrogen   
[Mass/Vol]      16 mg/dL                        6 - 23          [facility]   
[facility]   
[facility]  
Work Phone:   
[phone]  
  
   
                                                    Laboratory - Hematology and   
Cell countson 2021   
   
                                                    Erythrocyte   
distribution   
width (RBC)   
[Ratio]                   15.4 %                    above high   
threshold                 See Below                 [facility] [place] [place]  
Work Phone:   
[phone]  
  
   
                                                    Comment on   
above:                                  Reference Range: 11.5 - 14.5   
   
                                                    Hematocrit (Bld)   
[Volume   
fraction]       45.1 %                          See Below       [facility] [place] [place]  
Work Phone:   
[phone]  
  
   
                                                    Comment on   
above:                                  Reference Range: 41.0 - 52.0   
   
                                                    Hemoglobin (Bld)   
[Mass/Vol]      15.2 g/dL                       See Below       [facility]   
Associates   
[facility]  
Work Phone:   
[phone]  
  
   
                                                    Comment on   
above:                                  Reference Range: 13.5 - 17.5   
   
                                                    MCHC (RBC)   
[Mass/Vol]      33.7 g/dL                       See Below       [facility]   
[facility]   
[facility]  
Work Phone:   
[phone]  
21  
   
                                                    Comment on   
above:                                  Reference Range: 32.0 - 36.0   
   
                                                    MCV (RBC)   
[Entitic vol]   96 fL                           80 - 100        [facility]   
[facility]  
Work Phone:   
[phone]  
21  
   
                                                    Platelets (Bld)   
[#/Vol]         164 10*3/uL                     150 - 450       [facility] [place] [place]  
Work Phone:   
[phone]  
21  
   
                                                    RBC (Bld)   
[#/Vol]         4.70 {x10E12/L}                 See Below       [facility] [place] [place]  
Work Phone:   
[phone]  
21  
   
                                                    Comment on   
above:                                  Reference Range: 4.50 - 5.90   
   
                                                    WBC (Bld)   
[#/Vol]         6.4 10*3/uL                     4.4 - 11.3      [facility] [place] [place]  
Work Phone:   
[phone]  
21  
   
                                                    No Panel Informationon    
   
                                 >60                   >60        [facility] [place] [place]  
Work Phone:   
[phone]  
   
                                                    Comment on   
above:                                  CALCULATIONS OF ESTIMATED GFR ARE PERFOR  
MED USING THE MDRD STUDY   
EQUATION FOR THE IDMS-TRACEABLE CREATININE METHODS. CLIN CHEM   
2007;53:766-72   
   
                                                    Hematologyon 2021   
   
                                                    Hematocrit (Bld)   
[Volume   
fraction]                 29.6 %                    below low   
threshold                 See Below                 [facility] [address] [facility]  
Work Phone:   
[phone]  
[phone]  
   
                                                    Comment on   
above:                                  Reference Range: 41.0 - 52.0   
   
                                                    Hemoglobin (Bld)   
[Mass/Vol]                10.8 g/dL                 below low   
threshold                 See Below                 [place] [address] DO  
Work Phone:   
[phone]  
51  
   
                                                    Comment on   
above:                                  Reference Range: 13.5 - 17.5   
   
                                                    MCV (RBC)   
[Entitic vol]   100 fL                          80 - 100        [place] [address] DO  
Work Phone:   
[phone]  
51  
   
                                                    Platelets (Bld)   
[#/Vol]         226 {x10E9/L}                   150 - 450       [place] [address] DO  
Work Phone:   
[phone]  
[phone]  
   
                                                    RBC (Bld)   
[#/Vol]                   2.96 {x10E12/L}           below low   
threshold                 See Below                 [place] [address] DO  
Work Phone:   
[phone]  
[phone]  
   
                                                    Comment on   
above:                                  Reference Range: 4.50 - 5.90   
   
                                                    WBC (Bld)   
[#/Vol]         10.7 {x10E9/L}                  4.4 - 11.3      [facility]  
[place] [address] DO  
Work Phone:   
[phone]  
   
                                                    Metabolic Panelon 2021  
   
   
                                                    Anion gap   
[Moles/Vol]     11 mmol/L                       10 - 20         [facility]  
[place] [address] DO  
Work Phone:   
[phone]  
   
                                                    Calcium   
[Mass/Vol]      9.3 mg/dL                       8.6 - 10.3      [facility]  
[place] [address] DO  
Work Phone:   
[phone]  
   
                                                    Chloride   
[Moles/Vol]     107 mmol/L                      98 - 107        [facility]  
[place] [address] DO  
Work Phone:   
[phone]  
[phone]  
   
                      CO2 [Moles/Vol] 25 mmol/L             21 - 32    [facility]  
log  
[place] [address] DO  
Work Phone:   
[phone]  
   
                                                    Creatinine   
[Mass/Vol]      0.92 mg/dL                      See Below       [facility]  
[place] [address] DO  
Work Phone:   
[phone]  
   
                                                    Comment on   
above:                                  Reference Range: 0.50 - 1.30   
   
                                                    Glucose   
[Mass/Vol]                155 mg/dL                 above high   
threshold                 74 - 99                   [facility]  
[place] [address] DO  
Work Phone:   
[phone]  
   
                                                    Potassium   
[Moles/Vol]     3.9 mmol/L                      3.5 - 5.3       [facility]  
[place] [address] DO  
Work Phone:   
[phone]  
[phone]  
   
                                                    Sodium   
[Moles/Vol]     139 mmol/L                      136 - 145       [facility]  
[place] [address] DO  
Work Phone:   
[phone]  
   
                                                    Urea nitrogen   
[Mass/Vol]      18 mg/dL                        6 -           [facility]  
[place] [address] DO  
Work Phone:   
[phone]  
51  
   
                                                    Otheron 2021   
   
                                                    Erythrocyte   
distribution   
width (RBC)   
[Ratio]         13.8 %                          See Below       [facility]  
[place] [address] DO  
Work Phone:   
[phone]  
   
                                                    Comment on   
above:                                  Reference Range: 11.5 - 14.5   
   
                                                    MCHC (RBC)   
[Mass/Vol]                36.7 g/dL                 above high   
threshold                 See Below                 [place] [address] DO  
Work Phone:   
[phone]  
   
                                                    Comment on   
above:                                  Reference Range: 32.0 - 36.0   
   
                                 >60                   >60        [place] [address] DO  
Work Phone:   
[phone]  
   
                                                    Comment on   
above:                                  CALCULATIONS OF ESTIMATED GFR ARE PERFOR  
MED USING THE MDRD STUDY   
EQUATION FOR THE IDMS-TRACEABLE CREATININE METHODS. CLIN CHEM   
2007;53:766-72   
   
                                                    Hematologyon 2021   
   
                                                    Hematocrit (Bld)   
[Volume   
fraction]                 27.9 %                    below low   
threshold                 See Below                 [place] [address] DO  
Work Phone:   
[phone]  
   
                                                    Comment on   
above:                                  Reference Range: 41.0 - 52.0   
   
                                                    Hemoglobin (Bld)   
[Mass/Vol]                9.6 g/dL                  below low   
threshold                 See Below                 [place] [address] DO  
Work Phone:   
[phone]  
   
                                                    Comment on   
above:                                  Reference Range: 13.5 - 17.5   
   
                                                    MCV (RBC)   
[Entitic vol]   100 fL                          80 - 100        [place] [address] DO  
Work Phone:   
[phone]  
   
                                                    Platelets (Bld)   
[#/Vol]         155 {x10E9/L}                   150 - 450       [place] [address] DO  
Work Phone:   
[phone]  
   
                                                    RBC (Bld)   
[#/Vol]                   2.78 {x10E12/L}           below low   
threshold                 See Below                 [place] [address] DO  
Work Phone:   
[phone]  
   
                                                    Comment on   
above:                                  Reference Range: 4.50 - 5.90   
   
                                                    WBC (Bld)   
[#/Vol]         10.3 {x10E9/L}                  4.4 - 11.3      [place] [address] DO  
Work Phone:   
[phone]  
   
                                                    Metabolic Panelon 03-  
   
   
                                                    Glucose   
[Mass/Vol]                189 mg/dL                 above high   
threshold                 74 - 99                   [facility]  
[facility] 3 DO  
Work Phone:   
[phone]  
   
                                                    Comment on   
above:                                  Ordering Provider: MANN MARCELINO [id number]  
9   
   
                                                    Anion gap   
[Moles/Vol]     10 mmol/L                       10 - 20         [facility]  
[facility] 3 DO  
Work Phone:   
[phone]  
   
                                                    Calcium   
[Mass/Vol]      8.9 mg/dL                       8.6 - 10.3      [facility]  
[facility] 3 DO  
Work Phone:   
[phone]  
51  
   
                                                    Chloride   
[Moles/Vol]               108 mmol/L                above high   
threshold                 98 - 107                  [facility]  
[place] [address] DO  
Work Phone:   
[phone]  
51  
   
                      CO2 [Moles/Vol] 25 mmol/L             21 - 32    [facility]  
log  
[facility] 3 DO  
Work Phone:   
[phone]  
   
                                                    Creatinine   
[Mass/Vol]      1.17 mg/dL                      See Below       [facility]  
[place] [address] DO  
Work Phone:   
[phone]  
   
                                                    Comment on   
above:                                  Reference Range: 0.50 - 1.30   
   
                                                    Glucose   
[Mass/Vol]                131 mg/dL                 above high   
threshold                 74 - 99                   [facility]  
[facility] 3 DO  
Work Phone:   
[phone]  
   
                                                    Potassium   
[Moles/Vol]     4.4 mmol/L                      3.5 - 5.3       -Nephrolog  
[facility] 3 DO  
Work Phone:   
[phone]  
[phone]  
   
                                                    Sodium   
[Moles/Vol]     139 mmol/L                      136 - 145       -Nephrolog  
[place] [address] DO  
Work Phone:   
[phone]  
51  
   
                                                    Urea nitrogen   
[Mass/Vol]                29 mg/dL                  above high   
threshold                  - 23                    [facility]  
[facility] 3 DO  
Work Phone:   
[phone]  
   
                                                    Otheron 2021   
   
                                                    Erythrocyte   
distribution   
width (RBC)   
[Ratio]         13.7 %                          See Below       [facility]  
[facility] 3 DO  
Work Phone:   
[phone]  
   
                                                    Comment on   
above:                                  Reference Range: 11.5 - 14.5   
   
                                                    MCHC (RBC)   
[Mass/Vol]      34.6 g/dL                       See Below       [place] [address] DO  
Work Phone:   
[phone]  
   
                                                    Comment on   
above:                                  Reference Range: 32.0 - 36.0   
   
                                 >60                   >60        [place] [address] DO  
Work Phone:   
[phone]  
   
                                                    Comment on   
above:                                  CALCULATIONS OF ESTIMATED GFR ARE PERFOR  
MED USING THE MDRD STUDY   
EQUATION FOR THE IDMS-TRACEABLE CREATININE METHODS. CLIN CHEM   
2007;53:766-72   
   
                                                    Complete Blood Count + Diffe  
markon 2021   
   
                                                    Erythrocyte   
distribution   
width (RBC)   
[Ratio]         13.9 %                          See Below       [place] [address] DO  
Work Phone:   
[phone]  
   
                                                    Comment on   
above:                                  Reference Range: 11.5 - 14.5   
   
                                                            Ordering Provider: LEONARD MARCELINO [id number]   
   
                                                    Hematocrit (Bld)   
[Volume   
fraction]                 33.3 %                    below low   
threshold                 See Below                 [place] [address] DO  
Work Phone:   
[phone]  
   
                                                    Comment on   
above:                                  Reference Range: 41.0 - 52.0   
   
                                                            Ordering Provider: LEONARD ADHIKARICHEM [address]642   
   
                                                    Hemoglobin (Bld)   
[Mass/Vol]                11.3 g/dL                 below low   
threshold                 See Below                 [place] [address] DO  
Work Phone:   
[phone]  
   
                                                    Comment on   
above:                                  Reference Range: 13.5 - 17.5   
   
                                                            Ordering Provider: LEONARD MARCUM [place] [address]   
   
                                                    MCHC (RBC)   
[Mass/Vol]      34.0 g/dL                       See Below       [place] [address] DO  
Work Phone:   
[phone]  
   
                                                    Comment on   
above:                                  Reference Range: 32.0 - 36.0   
   
                                                            Ordering Provider: LEONARD ADHIKARICHEM [address]87   
   
                                                    MCV (RBC)   
[Entitic vol]   100 fL                          80 - 100        [place] [address] DO  
Work Phone:   
[phone]  
   
                                                    Comment on   
above:                                  Ordering Provider: MANN MARCELINO 6821  
9   
   
                                                    Platelets (Bld)   
[#/Vol]         226 {x10E9/L}                   150 - 450       [place] [address] DO  
Work Phone:   
[phone]  
51  
   
                                                    Comment on   
above:                                  Ordering Provider: MANN Malhotra21  
9   
   
                                                    RBC (Bld)   
[#/Vol]                   3.33 {x10E12/L}           below low   
threshold                 See Below                 [place] [address] DO  
Work Phone:   
[phone]  
51  
   
                                                    Comment on   
above:                                  Reference Range: 4.50 - 5.90   
   
                                                            Ordering Provider: LEONARD MARCELINO 75318   
   
                                                    WBC (Bld)   
[#/Vol]                   14.2 {x10E9/L}            above high   
threshold                 4.4 - 11.3                [place] [address] DO  
Work Phone:   
[phone]  
51  
   
                                                    Comment on   
above:                                  Ordering Provider: MANN Malhotra21  
9   
   
                                                    Complete Blood   
Count +   
Differential    SEE MANUAL DIFF                                 [place] [address] DO  
Work Phone:   
[phone]  
51  
   
                                                    Comment on   
above:                                  Ordering Provider: MANN Malhotra21  
9   
   
                                                    Hematologyon 2021   
   
                                                    Basophils (Bld)   
[#/Vol]         0.00 {x10E9/L}                  See Below       [place] [address] DO  
Work Phone:   
[phone]  
   
                                                    Comment on   
above:                                  Reference Range: 0.00 - 0.10   
   
                                                            Ordering Provider: LEONARD MARCELINO [address]   
   
                                                    Basophils/100   
WBC (Bld)       0.0 %                           0.0 - 2.0       [place] [address] DO  
Work Phone:   
[phone]  
51  
   
                                                    Comment on   
above:                                  Ordering Provider: MANN MARCELINO 6821  
9   
   
                                                    Eosinophils   
(Bld) [#/Vol]   0.00 {x10E9/L}                  See Below       [place] [address] DO  
Work Phone:   
[phone]  
51  
   
                                                    Comment on   
above:                                  Reference Range: 0.00 - 0.70   
   
                                                            Ordering Provider: LEONARD MARCELINO 03265   
   
                                                    Eosinophils/100   
WBC (Bld)       0.0 %                           0.0 - 6.0       [place] [address] DO  
Work Phone:   
[phone]  
   
                                                    Comment on   
above:                                  Ordering Provider: MANN MARCELINO 6821  
9   
   
                                                    Lymphocytes   
(Bld) [#/Vol]   1.99 {x10E9/L}                  See Below       [place] [address] DO  
Work Phone:   
[phone]  
51  
   
                                                    Comment on   
above:                                  Reference Range: 1.20 - 4.80   
   
                                                            Ordering Provider: LEONARD ADHIKARICHEM 56779   
   
                                                    Lymphocytes/100   
WBC (Bld)       14.0 %                          See Below       [place] [address] DO  
Work Phone:   
[phone]  
   
                                                    Comment on   
above:                                  Reference Range: 13.0 - 44.0   
   
                                                            Ordering Provider: LEONARD ADHIKARICHEM 81074   
   
                                                    Monocytes (Bld)   
[#/Vol]         0.43 {x10E9/L}                  See Below       [place] [address] DO  
Work Phone:   
[phone]  
   
                                                    Comment on   
above:                                  Reference Range: 0.10 - 1.00   
   
                                                            Ordering Provider: LEONARD ADHIKARICHEM 19206   
   
                                                    Monocytes/100   
WBC (Bld)       3.0 %                           2.0 - 10.0      [place] [address] DO  
Work Phone:   
[phone]  
   
                                                    Comment on   
above:                                  Ordering Provider: MANN MARCELINO 6821  
9   
   
                                                    Hematocrit (Bld)   
[Volume   
fraction]                 36.7 %                    below low   
threshold                 See Below                 [place] [address] DO  
Work Phone:   
[phone]  
51  
   
                                                    Comment on   
above:                                  Reference Range: 41.0 - 52.0   
   
                                                            Ordering Provider: LEONARD ADHIKARICHEM 37880   
   
                                                    Hemoglobin (Bld)   
[Mass/Vol]                12.6 g/dL                 below low   
threshold                 See Below                 [place] [address] DO  
Work Phone:   
[phone]  
   
                                                    Comment on   
above:                                  Reference Range: 13.5 - 17.5   
   
                                                            Ordering Provider: LEONARD ADHIKARICHEM [address]   
   
                                                    MCV (RBC)   
[Entitic vol]   100 fL                          80 - 100        [facility] [address] [facility]  
Work Phone:   
[phone]  
   
                                                    Comment on   
above:                                  Ordering Provider: MANN MARCELINO 6821  
9   
   
                                                    Platelets (Bld)   
[#/Vol]         271 {x10E9/L}                   150 - 450       [place] [address] DO  
Work Phone:   
[phone]  
[phone]  
   
                                                    Comment on   
above:                                  Ordering Provider: MANN MARCELINO 6821  
9   
   
                                                    RBC (Bld)   
[#/Vol]                   3.67 {x10E12/L}           below low   
threshold                 See Below                 [place] [address] DO  
Work Phone:   
[phone]  
[phone]  
   
                                                    Comment on   
above:                                  Reference Range: 4.50 - 5.90   
   
                                                            Ordering Provider: LEONARD MARCELINO 97980   
   
                                                    WBC (Bld)   
[#/Vol]                   17.8 {x10E9/L}            above high   
threshold                 4.4 - 11.3                [place] [address] DO  
Work Phone:   
[phone]  
   
                                                    Comment on   
above:                                  Ordering Provider: MANN MARCELINO 6821  
9   
   
                                                    Lactate, Levelon 2021   
   
                                                    Lactate   
[Moles/Vol]     1.9 mmol/L                      0.4 - 2.0       [place] [address] DO  
Work Phone:   
[phone]  
   
                                                    Comment on   
above:                                  Venipuncture immediately after or during  
 the administration of   
Metamizole may lead to falsely low results. Testing should be   
performed immediately prior to Metamizole dosing.   
   
                                                    Magnesium, Serumon    
   
                                                    Magnesium   
[Mass/Vol]                1.53 mg/dL                below low   
threshold                 See Below                 [place] [address] DO  
Work Phone:   
[phone]  
   
                                                    Comment on   
above:                                  Reference Range: 1.60 - 2.40   
   
                                                            Ordering Provider: LEONARD MARCELINO [address]70   
   
                                                    Metabolic Panelon 2021  
   
   
                                                    Glucose   
[Mass/Vol]                165 mg/dL                 above high   
threshold                 74 - 99                   [place] [address] DO  
Work Phone:   
[phone]  
[phone]  
   
                                                    Comment on   
above:                                  Ordering Provider: MANN MARCELINO 6821  
9   
   
                                                    Glucose   
[Mass/Vol]                115 mg/dL                 above high   
threshold                 74 - 99                   [facility]  
[facility] 3 DO  
Work Phone:   
[phone]  
[phone]  
   
                                                    Comment on   
above:                                  Ordering Provider: MANN MARCELINO 6821  
9   
   
                                                    Glucose   
[Mass/Vol]                146 mg/dL                 above high   
threshold                 74 - 99                   [facility]  
[facility] 3 DO  
Work Phone:   
[phone]  
[phone]  
   
                                                    Comment on   
above:                                  Ordering Provider: MANN MARCELINO 6821  
9   
   
                                                    Glucose   
[Mass/Vol]                153 mg/dL                 above high   
threshold                 74 - 99                   -Nephrolog  
[facility] 3 DO  
Work Phone:   
[phone]  
[phone]  
   
                                                    Comment on   
above:                                  Ordering Provider: MANN MARCELINO 6821  
9   
   
                                                    Anion gap   
[Moles/Vol]     12 mmol/L                       10 - 20         [facility]  
[facility] 3 DO  
Work Phone:   
[phone]  
[phone]  
   
                                                    Comment on   
above:                                  Ordering Provider: MANN MARCELINO 6821  
9   
   
                                                    Calcium   
[Mass/Vol]                8.1 mg/dL                 below low   
threshold                 8.6 - 10.3                -Nephrolog  
[facility] 3 DO  
Work Phone:   
[phone]  
[phone]  
   
                                                    Comment on   
above:                                  Ordering Provider: MANN MARCELINO 6821  
9   
   
                                                    Chloride   
[Moles/Vol]               108 mmol/L                above high   
threshold                 98 - 107                  [facility]  
[facility] 3 DO  
Work Phone:   
[phone]  
[phone]  
   
                                                    Comment on   
above:                                  Ordering Provider: MANN MARCELINO 6821  
9   
   
                      CO2 [Moles/Vol] 23 mmol/L             21 - 32    [facility]  
log  
[facility] 3 DO  
Work Phone:   
[phone]  
[phone]  
   
                                                    Comment on   
above:                                  Ordering Provider: MANN MARCELINO 6821  
9   
   
                                                    Creatinine   
[Mass/Vol]                1.72 mg/dL                above high   
threshold                 See Below                 -Nephrolog  
[facility] 3 DO  
Work Phone:   
[phone]  
[phone]  
   
                                                    Comment on   
above:                                  Reference Range: 0.50 - 1.30   
   
                                                            Ordering Provider: LEONARD MARCELINO 72891   
   
                                                    Glucose   
[Mass/Vol]                161 mg/dL                 above high   
threshold                 74 - 99                   [facility]  
[place] [address] DO  
Work Phone:   
[phone]  
   
                                                    Comment on   
above:                                  Ordering Provider: MANN MARCELINO 6821  
9   
   
                                                    Potassium   
[Moles/Vol]     4.9 mmol/L                      3.5 - 5.3       -Nephrolog  
[place] [address] DO  
Work Phone:   
[phone]  
   
                                                    Comment on   
above:                                  Ordering Provider: MANN ADHIKARICHEM 6821  
9   
   
                                                    Sodium   
[Moles/Vol]     138 mmol/L                      136 - 145       -Nephrolog  
[place] [address] DO  
Work Phone:   
[phone]  
   
                                                    Comment on   
above:                                  Ordering Provider: MANN MARCELINO 6821  
9   
   
                                                    Urea nitrogen   
[Mass/Vol]                34 mg/dL                  above high   
threshold                 6 - 23                    -Nephrolog  
[place] [address] DO  
Work Phone:   
[phone]  
   
                                                    Comment on   
above:                                  Ordering Provider: MANN MARCELINO 6821  
9   
   
                                                    Otheron 2021   
   
                                                    Segmented   
neutrophils/100   
WBC (Bld)       83.0 %                          See Below       -Nephrolog  
[place] [address] DO  
Work Phone:   
[phone]  
   
                                                    Comment on   
above:                                  Reference Range: 40.0 - 80.0 Percent dif  
ferential counts (%) should   
be interpreted in the context of the absolute cell counts (cells/L).   
   
                                                            Ordering Provider: LEONARD  
DAVIDPEREZ MARCUM [place] [address]   
   
                                                11.79 {x10E9/L} above high   
threshold                 See Below                 [facility]  
[place] [address] DO  
Work Phone:   
[phone]  
   
                                                    Comment on   
above:                                  Reference Range: 1.20 - 7.00   
   
                                                            Ordering Provider: LEONARD ALMAGUERSHERONPEREZ MARCUM [place] [address]   
   
                                                            Reference Range: 1.2  
0 - 7.70   
   
                                 NORMAL                           -Nephrolog  
[place] [address] DO  
Work Phone:   
[phone]  
   
                                                    Comment on   
above:                                  Ordering Provider: MANN MARCELINO 6821  
9   
   
                                 48 {mL/min/1.73m2} Abnormal   >60        -Nep  
hrolog  
[place] [address] DO  
Work Phone:   
[phone]  
   
                                                    Comment on   
above:                                  CALCULATIONS OF ESTIMATED GFR ARE PERFOR  
MED USING THE MDRD STUDY   
EQUATION FOR THE IDMS-TRACEABLE CREATININE METHODS. CLIN CHEM   
2007;53:766-72   
   
                                                            Ordering Provider: LEONARD ALMAGUERSHERONPEREZ JOSSUE [place]AR[place] [address]   
   
                                 40 {mL/min/1.73m2} Abnormal   >60        -Nep  
hrolog  
[place] [address] DO  
Work Phone:   
[phone]  
   
                                                    Comment on   
above:                                  Ordering Provider: MANN MARCELINO 6821  
9   
   
                                                    Erythrocyte   
distribution   
width (RBC)   
[Ratio]         13.4 %                          See Below       [facility] [address] [facility]  
Work Phone:   
[phone]  
   
                                                    Comment on   
above:                                  Reference Range: 11.5 - 14.5   
   
                                                            Ordering Provider: LEONARD ALMAGUERSHERONPEREZ MARCUM [place] [address]   
   
                                                    MCHC (RBC)   
[Mass/Vol]      34.3 g/dL                       See Below       [place] [address] DO  
Work Phone:   
[phone]  
   
                                                    Comment on   
above:                                  Reference Range: 32.0 - 36.0   
   
                                                            Ordering Provider: LEONARD ALMAGUERSHERONPEREZ JOSSUE [employer] [address]   
   
                                                    Phosphorus, Serumon 20  
21   
   
                                                    Phosphate   
[Mass/Vol]      3.6 mg/dL                       2.5 - 4.9       [place] [address] DO  
Work Phone:   
[phone]  
   
                                                    Comment on   
above:                                  The performance characteristics of phosp  
horus testing in heparinized   
plasma have been validated by the individual  laboratory site where   
testing is performed. Testing on heparinized plasma is not approved   
by the FDA; however, such approval is not necessary.   
   
                                                            Ordering Provider: LEONARD  
DAVIDPEREZ JOSSUE [employer] [address]   
   
                                                    CT Abdomen and Pelvis withou  
t Contraston 2021   
   
                                                    CT Abdomen and   
Pelvis WO   
contrast                                Interpreted by: JOAQUIN CARTER21   
15:58STUDY:CT Abdomen and   
Pelvis without IV Contrast;   
3/24/2021 1:49 PM.   
INDICATION:Left sided flank   
pain. Generalized abdominal   
pain since this morning.   
Additional History: Stones.   
COMPARISON:XR abdomen   
3/16/2021. CT urogram   
2021. ACCESSION   
NUMBER(S):[id number] ORDERING   
CLINICIAN:DARRIUS GANDHI DO TECHNIQUE:CT of   
the abdomen and pelvis was   
performed. Contiguous axial   
imageswere obtained at 3 mm   
slice thickness through the   
abdomen and pelvis. Coronal   
and sagittal reconstructions   
at 3 mm slice thickness   
wereperformed. No intravenous   
contrast was administered.   
Automated mA/kV exposure   
control was utilized and   
patient examinationwas   
performed in strict accordance   
with principles of ALARA.   
FINDINGS:Please note that the   
evaluation of vessels, lymph   
nodes and organs islimited   
without intravenous contrast.   
LOWER CHEST:No cardiomegaly.   
No pericardial effusion. Lung   
bases demonstratemild   
bibasilar atelectasis.   
ABDOMEN: LIVER:No   
hepatomegaly. Smooth surface   
contour. Normal attenuation.   
BILE DUCTS:No intrahepatic or   
extrahepatic biliary ductal   
dilatation.   
GALLBLADDER:Gallbladder is   
within normal CT limits.   
STOMACH:No abnormalities   
identified. PANCREAS:No masses   
or ductal dilatation.   
SPLEEN:No splenomegaly or   
focal splenic lesion. ADRENAL   
GLANDS:No thickening or   
nodules. KIDNEYS AND   
URETERS:Large amount of left   
perinephric hemorrhage is   
seen, measuring up to9.8 x 6.8   
cm in the axial plane, series   
2 image 55 and spanning up   
to20 cm in craniocaudad   
length, series 6 image 48.   
Hemorrhage tracksinferiorly   
along left retroperitoneum.   
Majority of the   
perinephrichemorrhage is   
subcapsular and there is   
resultant mass effect on   
thekidney, displaced   
anteriorly and medially,   
series 2 image 55. Correlate   
clinically for possibility of   
renal parenchymal   
compressionor Page kidney. No   
hydronephrosis, hydroureter or   
ureteric stone isseen. Cysts   
are seen in the bilateral   
kidneys. 4 mm   
nonobstructingcalculus is seen   
in the lower pole region left   
kidney. PELVIS:   
BLADDER:Diffuse bladder wall   
thickening is seen, possibly   
related to degree   
ofdistention. Correlate   
clinically and with urinalysis   
to excludesuperimposed   
infection. REPRODUCTIVE   
ORGANS:No abnormalities   
identified. BOWEL:No bowel   
obstruction or focal bowel   
inflammation is seen.   
Smallfat-containing umbilical   
hernia is noted. VESSELS:No   
abnormalities identified.   
Atherosclerotic calcification   
is seen inthe dominant aorta   
and branches without   
aneurysmal dilatation   
orperiaortic inflammation.   
PERITONEUM/RETROPERITONEUM/LYM  
PH NODES:No free fluid. No   
pneumoperitoneum. No   
lymphadenopathy. ABDOMINAL   
WALL:No abnormalities   
identified. SOFT TISSUES: No   
abnormalities identified.   
BONES:Degenerative changes are   
seen in the lumbar spine and   
pelvis, withoutsuperimposed   
acute osseous abnormality.   
IMPRESSION: 1. Interval   
development of large left   
perinephric and   
leftretroperitoneal   
hemorrhage, with large   
subcapsular component   
resultingin mass effect and   
anterior medial displacement   
of the left kidney. Correlate   
clinically for the possibility   
of renal   
parenchymalcompression and   
Page kidney. 2. Diffuse   
bladder wall thickening may be   
related to underdistention.   
Correlate clinically and with   
urinalysis to exclude   
superimposedurinary infection.   
NOTIFICATION: The critical   
results of the study were   
discussed with,and   
acknowledged by Darrius Chandler by telephone on   
3/24/2021at 15:44. Signed by   
Joaquin Carter MDElectronically signed by:   
JOAQUIN CARTER 21 15:58 Normal                                  -Nephro  
log  
[place] [address] DO  
Work Phone:   
[phone]  
   
                                                    Comment on   
above:                                  Ordering Provider: DARRIUS GANDHI 65  
935   
   
                                                    Complete Blood Count + Diffe  
rentialon 2021   
   
                                                    Basophils (Bld)   
[#/Vol]         0.10 {x10E9/L}                  See Below       [place] [address] DO  
Work Phone:   
[phone]  
   
                                                    Comment on   
above:                                  Reference Range: 0.00 - 0.10   
   
                                                            Ordering Provider: CYNTHIA GANDHI 79048   
   
                                                    Basophils/100   
WBC (Bld)       0.5 %                           0.0 - 2.0       [place] [address] DO  
Work Phone:   
[phone]  
   
                                                    Comment on   
above:                                  Ordering Provider: DARRIUS GANDHI 65  
939   
   
                                                    Eosinophils   
(Bld) [#/Vol]   0.10 {x10E9/L}                  See Below       [place] [address] DO  
Work Phone:   
[phone]  
   
                                                    Comment on   
above:                                  Reference Range: 0.00 - 0.70   
   
                                                            Ordering Provider: CYNTHIA GANDHI [address]1121   
   
                                                    Eosinophils/100   
WBC (Bld)       0.8 %                           0.0 - 6.0       [place] [address] DO  
Work Phone:   
[phone]  
   
                                                    Comment on   
above:                                  Ordering Provider: DARRIUS GANDHI 65  
938   
   
                                                    Erythrocyte   
distribution   
width (RBC)   
[Ratio]         13.4 %                          See Below       [facility]  
[place] [address] DO  
Work Phone:   
[phone]  
51  
   
                                                    Comment on   
above:                                  Reference Range: 11.5 - 14.5   
   
                                                            Ordering Provider: CYNTHIA GANDHI [address]337   
   
                                                    Hematocrit (Bld)   
[Volume   
fraction]       43.6 %                          See Below       [place] [address] DO  
Work Phone:   
[phone]  
51  
   
                                                    Comment on   
above:                                  Reference Range: 41.0 - 52.0   
   
                                                            Ordering Provider: CYNTHIA GANDHI [address]684   
   
                                                    Hemoglobin (Bld)   
[Mass/Vol]      14.7 g/dL                       See Below       [place] [address] DO  
Work Phone:   
[phone]  
51  
   
                                                    Comment on   
above:                                  Reference Range: 13.5 - 17.5   
   
                                                            Ordering Provider: CYNTHIA GANDHI 41506   
   
                                                    Lymphocytes   
(Bld) [#/Vol]   1.50 {x10E9/L}                  See Below       [place] [address] DO  
Work Phone:   
[phone]  
51  
   
                                                    Comment on   
above:                                  Reference Range: 1.20 - 4.80   
   
                                                            Ordering Provider: CYNTHIA GANDHI [address]806   
   
                                                    Lymphocytes/100   
WBC (Bld)       10.3 %                          See Below       [place] [address] DO  
Work Phone:   
[phone]  
51  
   
                                                    Comment on   
above:                                  Reference Range: 13.0 - 44.0   
   
                                                            Ordering Provider: CYNTHIA GANDHI [address]250   
   
                                                    MCHC (RBC)   
[Mass/Vol]      33.8 g/dL                       See Below       [place] [address] DO  
Work Phone:   
[phone]  
51  
   
                                                    Comment on   
above:                                  Reference Range: 32.0 - 36.0   
   
                                                            Ordering Provider: CYNTHIA GANDHI 67301   
   
                                                    MCV (RBC)   
[Entitic vol]   99 fL                           80 - 100        [place] [address] DO  
Work Phone:   
[phone]  
51  
   
                                                    Comment on   
above:                                  Ordering Provider: DARRIUS GANDHI 65  
939   
   
                                                    Monocytes (Bld)   
[#/Vol]         1.00 {x10E9/L}                  See Below       [place] [address] DO  
Work Phone:   
[phone]  
51  
   
                                                    Comment on   
above:                                  Reference Range: 0.10 - 1.00   
   
                                                            Ordering Provider: CYNTHIA  
LIZET GANDHI [address]   
   
                                                    Monocytes/100   
WBC (Bld)       6.4 %                           2.0 - 10.0      [place] [address] DO  
Work Phone:   
[phone]  
   
                                                    Comment on   
above:                                  Ordering Provider: DARRIUS GANDHI 65  
939   
   
                                                    Neutrophils/100   
WBC (Bld)       82.0 %                          See Below       [place] [address] DO  
Work Phone:   
[phone]  
   
                                                    Comment on   
above:                                  Reference Range: 40.0 - 80.0   
   
                                                            Ordering Provider: CYNTHIA  
LIZET GANDHI [address]57[address]   
   
                                                    Platelets (Bld)   
[#/Vol]         274 {x10E9/L}                   150 - 450       [place] [address] DO  
Work Phone:   
[phone]  
   
                                                    Comment on   
above:                                  Ordering Provider: DARRIUS GANDHI 65  
939   
   
                                                    RBC (Bld)   
[#/Vol]                   4.39 {x10E12/L}           below low   
threshold                 See Below                 [place] [address] DO  
Work Phone:   
[phone]  
   
                                                    Comment on   
above:                                  Reference Range: 4.50 - 5.90   
   
                                                            Ordering Provider: CYNTHIA  
LIZET GANDHI [address]   
   
                                                    WBC (Bld)   
[#/Vol]                   15.1 {x10E9/L}            above high   
threshold                 4.4 - 11.3                [place] [address] DO  
Work Phone:   
[phone]  
   
                                                    Comment on   
above:                                  Ordering Provider: DARRIUS GANDHI 65  
939   
   
                                                    Complete Blood   
Count +   
Differential              12.40 {x10E9/L}           above high   
threshold                 See Below                 [place] [address] DO  
Work Phone:   
[phone]  
   
                                                    Comment on   
above:                                  Reference Range: 1.20 - 7.70 Percent dif  
ferential counts (%) should   
be interpreted in the context of the absolute cell counts (cells/L).   
   
                                                            Ordering Provider: CYNTHIA  
LIZET GANDHI [address]   
   
                                                    Coronavirus 2019 RNA by PCR,  
 Screening [facility] 2021   
   
                                                    Coronavirus 2019   
RNA by PCR,   
Screening   
Asymptomtic     NOT DETECTED    Normal          See Below       -Nephrolog  
[facility] 3 DO  
Work Phone:   
[phone]  
   
                                                    Comment on   
above:                                  SOURCE: Nasal, NasopharyngealReference R  
niki: Not Detected.This test   
has received FDA Emergency Use Authorization (EUA) and has been   
verified by [facility]. This test   
is only authorized for the duration of time that circumstances exist   
to justify the authorization of the emergency use of in vitro   
diagnostic tests for the detection of SARS-CoV-2 virus and/or   
diagnosis of COVID-19 infection under section 564(b)(1) of the Act,   
21 U.S.C. 360bbb-3(b)(1), unless the authorization is terminated or   
revoked sooner. [facility] is   
certified under CLIA-88 as qualified to perform high complexity   
testing. Testing is performed in the [facility]   
laboratory located at [address] [place] [address].SARS-CoV-2/Flu/RSV Multiplex Test: Fact sheet for providers:   
https://www.fda.gov/media/035457/downloadFact sheet for patients:   
https://www.fda.gov/media/720030/download   
   
                                                            Ordering Provider: CYNTHIA GANDHI [address]   
   
                                                    Charo Bloodon 2021   
   
                                                    Bacteria   
identified Cx   
Nom (Bld)                               PATIENT: NICK CLOUD MRN:   
[id number] LOCATION: [facility]#: [id number] :   
53 AGE: SEX: M ORDER#:   
[id number] ORDERED BY: VEDA IRELAND: Blood   
COLLECTED: 21   
19:32ANTIBIOTICS AT EVELIO.:   
RECEIVED : 21 23:04SITE:   
R E S U L T S BLOOD CULTURE,   
BACTERIAL PRELIM 21   
23:42 No Growth at 1 days No   
Growth at 2 days No Growth at   
3 days No Growth at 4 days                                         [facility]  
Work Phone:   
[phone]  
   
                                                    Comment on   
above:                                  Ordering Provider: MANN MARCELINO 6821  
9   
   
                                                    Bacteria   
identified Cx   
Nom (Bld)                               PATIENT: NICK CLOUD MRN:   
[id number] LOCATION: [facility]#: [id number] :   
53 AGE: SEX: M ORDER#:   
[id number] ORDERED BY: VEDA IRELAND: Blood   
COLLECTED: 21   
19:31ANTIBIOTICS AT EVELIO.:   
RECEIVED : 21 23:04SITE:   
R E S U L T S BLOOD CULTURE,   
BACTERIAL PRELIM 21   
23:42 No Growth at 1 days No   
Growth at 2 days No Growth at   
3 days No Growth at 4 days                                         [facility] [address] [facility]  
Work Phone:   
[phone]  
   
                                                    Comment on   
above:                                  Ordering Provider: MANN MARCELINO 6821  
9   
   
                                                    Hematologyon 2021   
   
                                                    ABO group Nom   
(Bld)           A                                               [place] [address] DO  
Work Phone:   
[phone]  
[phone]  
   
                                                    Comment on   
above:                                  Ordering Provider: DARRIUS GANDHI 65  
939   
   
                                                    Blood group   
antibody screen   
Ql              Negative                                        [place] [address] DO  
Work Phone:   
[phone]  
[phone]  
   
                                                    Comment on   
above:                                  Ordering Provider: DARRIUS GANDHI 65  
939   
   
                                                    Rh immune   
globulin screen   
(Bld) [Interp]  Positive                                        [place] [address] DO  
Work Phone:   
[phone]  
[phone]  
   
                                                    Comment on   
above:                                  Ordering Provider: DARRIUS GANDHI 65  
939   
   
                                                    aPTT Coag (PPP)   
[Time]          29 {sec}                        25 - 35         [place] [address] DO  
Work Phone:   
[phone]  
[phone]  
   
                                                    Comment on   
above:                                  THE APTT IS NO LONGER USED FOR MONITORIN  
G UNFRACTIONATED HEPARIN   
THERAPY. FOR MONITORING HEPARIN THERAPY, USE THE HEPARIN ASSAY.   
   
                                                            Ordering Provider: CYNTHIA GANDHI [address]   
   
                                                    INR Coag (PPP)   
[Relative time]           1.7 {INR}                 above high   
threshold                 0.9 - 1.1                 [place] [address] DO  
Work Phone:   
[phone]  
[phone]  
   
                                                    Comment on   
above:                                  Ordering Provider: DARRIUS GANDHI 65  
939   
   
                                                    PT Coag (PPP)   
[Time]                    20.5 {sec}                above high   
threshold                 See Below                 [facility] [address] [facility]  
Work Phone:   
[phone]  
   
                                                    Comment on   
above:                                  Reference Range: 10.1 - 13.3   
   
                                                            Ordering Provider: CYNTHIA GANDHI [address]4827   
   
                                                    Lactate, Levelon 2021   
   
                                                    Lactate   
[Moles/Vol]               3.4 mmol/L                above high   
threshold                 0.4 - 2.0                 [place] [address] DO  
Work Phone:   
[phone]  
   
                                                    Comment on   
above:                                  Venipuncture immediately after or during  
 the administration of   
Metamizole may lead to falsely low results. Testing should be   
performed immediately prior to Metamizole dosing.   
   
                                                            Ordering Provider: LEONARD MARCELINO [address]   
   
                                                    Lactate   
[Moles/Vol]               2.2 mmol/L                above high   
threshold                 0.4 - 2.0                 [place] [address] DO  
Work Phone:   
[phone]  
   
                                                    Comment on   
above:                                  Venipuncture immediately after or during  
 the administration of   
Metamizole may lead to falsely low results. Testing should be   
performed immediately prior to Metamizole dosing.   
   
                                                            Ordering Provider: LEONARD MARCELINO [address]   
   
                                                    Metabolic Panelon 2021  
   
   
                                                    Glucose   
[Mass/Vol]                214 mg/dL                 above high   
threshold                 74 - 99                   [place] [address] DO  
Work Phone:   
[phone]  
[phone]  
   
                                                    Comment on   
above:                                  Ordering Provider: MANN MARCELINO 6821  
9   
   
                                                    Anion gap   
[Moles/Vol]     15 mmol/L                       10 - 20         [place] [address] DO  
Work Phone:   
[phone]  
[phone]  
   
                                                    Comment on   
above:                                  Ordering Provider: DARRIUS GANDHI 65  
939   
   
                                                    Calcium   
[Mass/Vol]      8.8 mg/dL                       8.6 - 10.3      [place] [address] DO  
Work Phone:   
[phone]  
   
                                                    Comment on   
above:                                  Ordering Provider: DARRIUS GANDHI 65  
939   
   
                                                    Chloride   
[Moles/Vol]     107 mmol/L                      98 - 107        [place] [address] DO  
Work Phone:   
[phone]  
51  
   
                                                    Comment on   
above:                                  Ordering Provider: DARRIUS GANDHI 65  
939   
   
                                CO2 [Moles/Vol] 19 mmol/L       below low   
threshold                 21 - 32                   [facility] [address] [facility]  
Work Phone:   
[phone]  
[phone]  
   
                                                    Comment on   
above:                                  Ordering Provider: DARRIUS GANDHI 65  
939   
   
                                                    Creatinine   
[Mass/Vol]      1.08 mg/dL                      See Below       [facility] [address] [facility]  
Work Phone:   
[phone]  
[phone]  
   
                                                    Comment on   
above:                                  Reference Range: 0.50 - 1.30   
   
                                                            Ordering Provider: CYNTHIA GANDHI 86619   
   
                                                    Glucose   
[Mass/Vol]                194 mg/dL                 above high   
threshold                 74 - 99                   [place] [address] DO  
Work Phone:   
[phone]  
   
                                                    Comment on   
above:                                  Ordering Provider: DARRIUS GANDHI 65  
939   
   
                                                    Potassium   
[Moles/Vol]     3.8 mmol/L                      3.5 - 5.3       [facility] [address] [facility]  
Work Phone:   
[phone]  
   
                                                    Comment on   
above:                                  Ordering Provider: DARRIUS GANDHI 65  
939   
   
                                                    Sodium   
[Moles/Vol]     137 mmol/L                      136 - 145       [facility] [place] [address] [facility]  
Work Phone:   
[phone]  
[phone]  
   
                                                    Comment on   
above:                                  Ordering Provider: DARRIUS GANDHI 65  
939   
   
                                                    Urea nitrogen   
[Mass/Vol]      23 mg/dL                        6 - 23          [facility] [place] [address] [facility]  
Work Phone:   
[phone]  
[phone]  
   
                                                    Comment on   
above:                                  Ordering Provider: DARRIUS GANDHI 65  
939   
   
                                                    Otheron 2021   
   
                                 >60                   >60        [facility] [place] [address] [facility]  
Work Phone:   
[phone]  
   
                                                    Comment on   
above:                                  CALCULATIONS OF ESTIMATED GFR ARE PERFOR  
MED USING THE MDRD STUDY   
EQUATION FOR THE IDMS-TRACEABLE CREATININE METHODS. CLIN CHEM   
2007;53:766-72   
   
                                                            Ordering Provider: CYNTHIA GANDHI [address]113   
   
                                                    Plasmaon 03-   
   
                      Plasma     ORDER RECD                       [place] [address] DO  
Work Phone:   
[phone]  
   
                                                    Comment on   
above:                                  Ordering Provider: DARRIUS GANDHI 65  
709   
   
                                                    Metabolic Panelon 2021  
   
   
                                                    Glucose   
[Mass/Vol]                116 mg/dL                 above high   
threshold                 74 - 99                   [place] [address] DO  
Work Phone:   
[phone]  
   
                                                    Otheron 2021   
   
                                        XR Abdomen AP       Interpreted by: KOSM  
AS21   
22:22MRN: [id number]   
Name: NICK CLOUD   
STUDY:ABDOMEN AP VIEW;   
3/16/2021 12:17 pm   
INDICATION:FLANK PAIN.   
ACCESSION NUMBER(S):[id number]   
ORDERING CLINICIAN:RL ROSEN   
FINDINGS:Abdomen, two views   
There is an 11 mm   
calcification of the left   
renal shadow. No   
abnormalcalcification of the   
right renal shadow.   
Phleboliths over the pelvis.No   
bowel obstruction seen   
IMPRESSION:11 mm left renal   
calculusElectronically signed   
by: BALAJI 21 22:22 Normal                                  [place] [place] [address] DO  
Work Phone:   
[phone]  
   
                                                    Coronavirus 2019 RNA by PCR,  
 Screening Asymptomticon 2021   
   
                                                    Coronavirus 2019   
RNA by PCR,   
Screening   
Asymptomtic     NOT DETECTED    Normal          See Below       [place] [address] DO  
Work Phone:   
[phone]  
   
                                                    Comment on   
above:                                  SOURCE: Nasal, NasopharyngealReference R  
niki: Not Detected.This assay   
is designed to detect the N, ORF1ab and/or S genes of SARS-CoV-2 via   
nucleic acid amplification. A Negative (NOT DETECTED) result does not   
preclude 2019-nCoV infection since the adequacy of sample collection   
and/or low viral burden may result in presence of viral nucleic acids   
below the clinical sensitivity of this test method. Negative (NOT   
DETECTED) result should not be used as the sole basis for treatment   
or other patient management decisions. Rather negative results should   
be combined with clinical observations, patient history, and   
epidemiological information to make patient management decisions.Fact   
sheet for providers: https://www.fda.gov/media/417620/downloadFact   
sheet for patients: https://www.fda.gov/media/211592/downloadThis   
test has received FDA Emergency Use Authorization (EUA) and has been   
verified by [facility] ([facility]).   
This test is only authorized for the duration of time that   
circumstances exist to justify the authorization of the emergency use   
of in vitro diagnostic tests for the detection of SARS-CoV-2 virus   
and/or diagnosis of COVID-19 infection under section 564(b)(1) of the   
Act, 21 U.S.C. 360bbb-3(b)(1), unless the authorization is terminated   
or revoked sooner. [facility] is   
certified under CLIA-88 as qualified to perform high complexity   
testing. Testing is performed in the [facility] laboratories located at   
[address] [place] [address].   
   
                                                    Metabolic Panelon 2021  
   
   
                                                    Glucose   
[Mass/Vol]                100 mg/dL                 above high   
threshold                 74 - 99                   MP-Nephrolog  
[facility]   
Mike 3 DO  
Work Phone:   
[phone]  
   
                                                    Otheron 2021   
   
                                                            MRN: [id number]  
 Name   
NICK CLOUDAccession #:   
[id number] Procedure:   
3/2/2021 Date Reported:   
3/5/2021Date Received:   
3/2/2021Date of Birth / Sex   
1955 (Age: 65) / MRace:   
Submitting Physician: AHSAN LOFTON IIttending   
Physician: Other External #   
FINAL CYTOLOGICAL   
INTERPRETATIONA. LEFT URETERAL   
WASHINGS: CLUSTERS OF   
UROTHELIAL CELLS LACKING   
SIGNIFICANT CYTOLOGIC ATYPIA;   
ORIGINFROM A NON-NEOPLASTIC   
PROCESS IS FAVORED.This case   
signed out at:[facility]   
[address] [place] [id number](s)   
initially screened by a   
Cytotechnologist at [place] [address]Electronically Signed Out   
By CAROLINE GARCÍA MDBy the signature on this   
report, the individual or   
group listed as making   
theFinal   
Interpretation/Diagnosis   
certifies that they have   
reviewed this case.Slide(s)   
initially screened by a   
Cytotechnologist at [facility] Clinical   
HistoryPhysician Contact   
Number: [phone] Diagnosis   
History R31.9 Hematuria,   
unspecified Source of   
SpecimenA: LEFT URETRAL   
WASHINGS Specimen Submitted   
as:A: LEFT URETRAL WASHINGS   
Pap non-gyn ThinPrep   
slideGross DescriptionRECEIVED   
10cc OF PALE YELLOW CLOUDY   
FLUID.30cc OF CYTOLYT   
ADDED.40cc TOTALLY SUBMITTED.   
[facility]   
[address] [place] [address]                                         MP-Nephrolog  
[facility]   
Mike 3 DO  
Work Phone:   
[phone]  
  
   
                                                            Please click on the   
link to   
view the study images Normal                                  -Nephrolog  
[facility] 3 DO  
Work Phone:   
[phone]  
   
                                                    CT Urogramon 2021   
   
                                        CT Urogram          Please click on the   
link to   
view the study images Normal                                  GW-Zynxwpv-E  
[facility]  
Work Phone:   
[phone]  
00  
   
                                                    IO UA (automated w/o microsc  
opy)on 2021   
   
                                                    Protein (U)   
[Mass/Vol]      Trace                                           FA-Ycvlwjo-E  
[facility]  
Work Phone:   
[phone]  
00  
   
                                                    IO UA (automated   
w/o microscopy) Negative                                        HT-Eymzmsh-T  
[facility]  
Work Phone:   
[phone]  
00  
   
                                                    IO UA (automated   
w/o microscopy) 1.020                                           WN-Cpzwjqf-S  
[facility]  
Work Phone:   
[phone]  
[phone]  
   
                                                    IO UA (automated   
w/o microscopy) Yellow                                          EN-Cxvfzlu-F  
[facility]  
Work Phone:   
[phone]  
00  
   
                                                    IO UA (automated   
w/o microscopy) 6.5                                             FA-Xomybom-B  
[facility]  
Work Phone:   
[phone]  
00  
   
                                                    IO UA (automated   
w/o microscopy) (+++)large - 80                                 IW-Tydqdgl-W  
[facility]  
Work Phone:   
[phone]  
00  
   
                                                    IO UA (automated   
w/o microscopy) Normal (0.2-1.0 mg/dl)                                 MP-Urolog  
y-A  
[facility]  
Work Phone:   
[phone]  
00  
   
                                                    IO UA (automated   
w/o microscopy) Cloudy                                          JH-Ceynwxa-I  
[facility]  
Work Phone:   
[phone]  
00  
   
                                                    ECHOCARDIOGRAM 2D COMPLETEon  
 2020   
   
                                                    Aortic valve   
area            3.49742                         cm              [facility]  
   
                      AV mean gradient 2.12495               mmHg       [facility]  
th  
   
                      AV peak gradient 4.31699               mmHg       [facility]  
   
                      EF         54.8261 %                        [facility]  
   
                                                            Patient Info Name: KLAUDIA CLOUD Age: 65 years :   
1955 Gender: Male MRN:   
[id number] Accession #:   
[id number] Ht: 175 cm Wt:   
113 kg BSA: 2.40 m2 HR: 92 bpm   
BP: 181 / 85 mmHg Heart   
Rhythm: Atrial Fibrillation   
Technical Quality: Poor,   
Technically difficult Exam   
Date: 2020 6:49 AM   
Patient Status: Outpatient   
Sonographer: Zenaida Dias RN, RCS Exam Type:   
ECHOCARDIOGRAM COMPLETE W   
CONTRAST Study Info   
Indications - Afib/Aflutter   
Referring Physician: Jason Bonilla; [id number] BMI: 36.92   
kg/m2 Summary 1. Left   
ventricular systolic function   
is low normal, with ejection   
fraction estimated at 50 +/-   
5%. 2. The anteroseptal wall,   
apical septum, apical inferior   
wall, and apical cap are   
hypokinetic. 3. Moderate left   
ventricular concentric   
hypertrophy. 4. There is mild   
to moderate mitral valve   
regurgitation. History/Risk   
Factors Hypertension: Yes   
Dyslipidemia: Yes Myocardial   
Infarction (MI): Yes Coronary   
Artery Disease (CAD) Yes Date   
of Prior MI: 2014   
Diabetes Mellitus: Yes Tobacco   
Use: Former Family History:   
Coronary Artery Disease   
History/Risk Factors Atrial   
fibrillation/flutter.   
Procedure(s): Complete   
two-dimensional, color flow   
and Doppler transthoracic   
echocardiogram is performed   
with contrast. Definity   
explained to patient. Patient   
verbalizes understanding and   
agrees to proceed. Definity   
1.3ml/8.7ml normal sterile   
saline 2 ml total given IV   
over 30-60 seconds. Left   
Ventricle The left ventricular   
endocardium is poorly   
visualized. Left ventricular   
chamber dimension is mildly   
enlarged. Left ventricular   
systolic function is low   
normal, with ejection fraction   
estimated at 50 +/- 5%.   
Moderate left ventricular   
concentric hypertrophy. Left   
ventricular segmental wall   
motion is abnormal. There is   
normal diastolic function. The   
anteroseptal wall, apical   
septum, apical inferior wall,   
and apical cap are   
hypokinetic. All other walls   
appear normal. Right Ventricle   
Right ventricular chamber   
dimension is normal. Right   
ventricular systolic function   
is normal. Left Atria Left   
atrial chamber dimension is   
severely enlarged. Right Atria   
Right atrial chamber dimension   
is normal. Aortic Valve The   
aortic valve is trileaflet.   
There is no aortic valve   
sclerosis. There is no aortic   
valve stenosis with a peak   
velocity of 1.1 m/s, mean   
gradient of 2 mmHg, and aortic   
valve area of 3.23 cm2. There   
is no aortic valve   
regurgitation. Pulmonic Valve   
The pulmonic valve is normal.   
There is no pulmonic valve   
stenosis. There is trace   
pulmonic regurgitation. Mitral   
Valve The mitral valve has   
normal leaflets. There is no   
mitral valve stenosis. There   
is mild to moderate mitral   
valve regurgitation. Tricuspid   
Valve The tricuspid valve   
leaflets are normal. There is   
no significant tricuspid valve   
stenosis. There is trace   
tricuspid valve regurgitation.   
Pericardium/Pleural The   
pericardium appears normal.   
There is no pericardial   
effusion. Inferior Vena Cava   
Normal inferior vena cava with   
>50% collapse upon inspiration   
consistent with normal right   
atrial pressure. Aorta The   
aortic measurements are   
indexed to age and body   
surface area. The aortic root   
is normal measuring 3.49 cm   
with an index of 1.46 cm/m2.   
The proximal ascending aorta   
is normal measuring 3.4 cm   
with an index of 1.40 cm/m2.   
Wall Motion Scoring Wall   
Motion Scoring Index: 1.35   
Left Ventricular Outflow Tract   
------------------------------  
------------------------------  
---------- Name Value Normal   
------------------------------  
------------------------------  
---------- LVOT 2D   
------------------------------  
------------------------------  
---------- LVOT Diameter 2.43   
cm LVOT Doppler   
------------------------------  
------------------------------  
---------- LVOT Peak Velocity    
0.69 m/s LVOT Peak Gradient 2   
mmHg LVOT Mean Gradient 1 mmHg   
LVOT VTI 13.24 cm LVOT VTI/AV   
VTI Ratio 0.69 LVOT Stroke   
Volume 61.26 ml LVOT Stroke   
Index 26.99 ml/m2 Pulmonic   
Valve   
------------------------------  
------------------------------  
---------- Name Value Normal   
------------------------------  
------------------------------  
---------- PV 2D   
------------------------------  
------------------------------  
---------- RVOT Diameter (2D)   
2.88 cm 1.70-2.70 RVOT Doppler   
------------------------------  
------------------------------  
---------- RVOT Peak Velocity   
69.93 cm/s RVOT Peak Gradient   
2 mmHg RVOT Mean Gradient 1   
mmHg RVOT VTI 12.24 cm  PV   
Doppler   
------------------------------  
------------------------------  
---------- PV Peak Velocity   
0.84 m/s PV Peak Gradient 3   
mmHg  PV Mean Gradient 1 mmHg   
PV VTI 18.78 cm PV Area (Cont   
Eq VTI) 4.24 cm2 PV Area Index   
(Cont Eq VTI) 1.77 cm2/m2 PV   
Area (Cont Eq Adebayo) 5.43 cm2 PV   
Area Index (Cont Eq Adebayo) 2.27   
cm2/m2 Mitral Valve   
------------------------------  
------------------------------  
---------- Name Value Normal   
------------------------------  
------------------------------  
---------- MV Doppler   
------------------------------  
------------------------------  
---------- MV Peak Velocity   
0.91 m/s MV Peak Gradient 3   
mmHg MV Mean Gradient 1 mmHg    
MV VTI 15.07 cm MV Area (Cont   
Eq VTI) 4.06 cm2 MV Area Index   
(Cont Eq VTI) 1.70 cm2/m2 MV   
Diastolic Function   
------------------------------  
------------------------------  
---------- MV E Peak Velocity   
0.75 m/s MV Annular TDI   
------------------------------  
------------------------------  
---------- MV Septal e'   
Velocity 6.92 cm/s >=8.00 MV   
E/e' (Septal) 10.81 <=8.00 MV   
Lateral e' Velocity 6.63 cm/s   
>=10.00 MV E/e' (Lateral)   
11.29 <=8.00 MV e' Average   
6.78 MV E/e' (Average) 11.05   
Tricuspid Valve   
------------------------------  
------------------------------  
---------- Name Value Normal   
------------------------------  
------------------------------  
---------- Estimated PAP/RSVP   
------------------------------  
------------------------------  
---------- RA Pressure 8 mmHg   
<=5 Aorta   
------------------------------  
------------------------------  
---------- Name  Value Normal   
------------------------------  
------------------------------  
---------- Ascending Aorta   
------------------------------  
------------------------------  
---------- Ao Root Diameter   
(2D) 3.49 cm 3.10-3.70 Ao Root   
Diam Index (2D) 1.46 cm/m2   
1.50-1.90 Prox Asc Ao Diameter   
3.4 cm 2.6-3.4 Prox Asc Ao   
Diameter Index 1.40 cm/m2   
1.30-1.70 Venous   
------------------------------  
------------------------------  
---------- Name Value Normal   
------------------------------  
------------------------------  
---------- IVC/SVC   
------------------------------  
------------------------------  
---------- IVC Diameter (Exp   
2D) 2.27 cm <=2.10 Aortic   
Valve   
------------------------------  
------------------------------  
---------- Name Value Normal   
------------------------------  
------------------------------  
---------- AV Doppler   
------------------------------  
------------------------------  
---------- AV Peak Velocity   
1.1 m/s AV Peak Gradient 5   
mmHg AV Mean Gradient 2 mmHg   
AV VTI 19.10 cm AV Area (Cont   
Eq VTI) 3.23 cm2 AV Area Index   
(Cont Eq VTI) 1 cm2/m2  AV   
Area (Cont Eq Adebayo) 2.90 cm2 AV   
Area Index (Cont Eq Adebayo) 1   
cm2/m2 LVOT Vmax/AV Vmax 0.63   
LVOT VTI/AV VTI Ratio 0.69 AV   
Regurgitation 2D   
------------------------------  
------------------------------  
---------- LVOT Area 4.63 cm2   
Ventricles   
------------------------------  
------------------------------  
---------- Name Value Normal   
------------------------------  
------------------------------  
---------- LV Dimensions 2D/MM   
------------------------------  
------------------------------  
---------- IVS Diastolic   
Thickness (2D) 1.53 cm   
0.60-1.00 LVID Diastole (2D)   
5.15 cm 4.20-5.80 LVIW   
Diastolic Thickness (2D) 1.50   
cm 0.60-1.00 LVID Systole (2D)   
3.68 cm 2.50-4.00 LVOT   
Diameter 2.43 cm LV Mass (2D   
Cubed) 342 g  LV Mass   
Index (2D Cubed) 143 g/m2   
 Relative Wall Thickness   
(2D) 0.58 <=0.42 LV Fractional   
Shortening/Ejection Fraction   
2D/MM   
------------------------------  
------------------------------  
---------- LV Fractional   
Shortening (2D) 28 % 25-43 LV   
EF (2D Teicholz)  55 % 52-72   
LV Diastolic Volume (4C MOD)   
193 ml LV Systolic Volume (4C   
MOD) 88 ml LV EF (4C MOD) 54 %   
LV Diastolic Volume (2C MOD)   
178 ml LV Systolic Volume (2C   
MOD) 79 ml LV EF (2C MOD) 56 %   
LV Diastolic Volume (BP MOD)   
185.24 ml 62..00 LV   
Diastolic Volume Index (BP   
MOD) 77.34 ml/m2 34.00-74.00   
LV Systolic Volume (BP MOD)   
83.68 ml 21.00-61.00 LV   
Systolic Volume Index (BP MOD)   
 34.94 ml/m2 11.00-31.00 LV EF   
(BP MOD) 55 % 55-70 LV   
Diastolic Length (4C) 8.93 cm   
LV Systolic Length (4C) 7.75   
cm LV Stroke Volume (4C MOD)   
104.87 ml RV Dimensions 2D/MM   
------------------------------  
------------------------------  
---------- TAPSE 2.45 cm   
>=1.70 RV Systolic Function   
------------------------------  
------------------------------  
---------- RV s' Velocity 2.29   
m/s 0.10-0.19 Atria   
------------------------------  
------------------------------  
---------- Name Value Normal   
------------------------------  
------------------------------  
---------- LA Dimensions   
------------------------------  
------------------------------  
---------- LA Minor Axis Width   
6.94 cm LA Area (4C) 36.77 cm2   
LA Area (2C)  34.51 cm2 LA   
Length (2C) 6.78 cm LA Volume   
(4C MOD) 158.17 ml LA Volume   
(2C MOD) 144.63 ml  LA Volume   
(4C A-L) 165.21 ml LA Volume   
(2C A-L) 149.28 ml LA Volume   
(BP A-L) 158.93 ml LA Volume   
Index (BP A-L) 66.35 ml/m2   
<=34.00 LA Volume (BP MOD)   
152.92 ml LA Volume Index (BP   
MOD) 63.85 ml/m2 16.00-34.00   
Report Signatures Finalized by   
Kraig Gilbert MD on   
2020 10:06 AM                                         [facility]  
   
                                                            Interface, Rad In   
artlab   
Xper Echopacs - 2020   
10:07 AM EST Patient Info  
Name: NICK CLOUD  
Age: 65 years  
: 1955  
Gender: Male  
MRN: [id number]  
Accession #: [id number]  
Ht: 175 cm  
Wt: 113 kg  
BSA: 2.40 m2  
HR: 92 bpm  
BP: 181  
/ 85 mmHg  
Heart Rhythm: Atrial   
Fibrillation  
Technical Quality: Poor,   
Technically difficult  
Exam Date: 2020 6:49 AM  
Patient Status: Outpatient  
  
Sonographer: Zenaida Dias,   
MYLENE, RCS  
  
Exam Type: ECHOCARDIOGRAM   
COMPLETE W CONTRAST  
  
Study Info  
Indications  
- Afib/Aflutter  
  
Referring Physician: Jason Bonilla;  
  
[id number]  
  
BMI: 36.92 kg/m2  
  
  
Summary  
1. Left ventricular systolic   
function is low normal, with   
ejection fraction  
estimated at 50 +/- 5%.  
2. The anteroseptal wall,   
apical septum, apical inferior   
wall, and apical  
cap are hypokinetic.  
3. Moderate left ventricular   
concentric hypertrophy.  
4. There is mild to moderate   
mitral valve regurgitation.  
  
  
History/Risk Factors  
Hypertension: Yes  
Dyslipidemia: Yes  
Myocardial Infarction (MI):   
Yes  
Coronary Artery Disease (CAD)   
Yes  
Date of Prior MI: 2014  
Diabetes Mellitus: Yes  
Tobacco Use: Former  
Family History: Coronary   
Artery Disease  
  
History/Risk Factors  
Atrial fibrillation/flutter.  
  
  
Procedure(s):  
Complete two-dimensional,   
color flow and Doppler   
transthoracic  
echocardiogram is performed   
with contrast.  
Definity explained to patient.   
Patient verbalizes   
understanding and agrees  
to proceed. Definity   
1.3ml/8.7ml normal sterile   
saline 2 ml total given IV  
over 30-60 seconds.  
  
Left Ventricle  
The left ventricular   
endocardium is poorly   
visualized.  
Left ventricular chamber   
dimension is mildly enlarged.  
Left ventricular systolic   
function is low normal, with   
ejection fraction  
estimated at 50 +/- 5%.  
Moderate left ventricular   
concentric hypertrophy.  
Left ventricular segmental   
wall motion is abnormal.  
There is normal diastolic   
function.  
The anteroseptal wall, apical   
septum, apical inferior wall,   
and apical cap  
are hypokinetic.  
All other walls appear normal.  
  
Right Ventricle  
Right ventricular chamber   
dimension is normal.  
Right ventricular systolic   
function is normal.  
  
  
Left Atria  
Left atrial chamber dimension   
is severely enlarged.  
  
Right Atria  
Right atrial chamber dimension   
is normal.  
  
  
Aortic Valve  
The aortic valve is   
trileaflet.  
There is no aortic valve   
sclerosis.  
There is no aortic valve   
stenosis with a peak velocity   
of 1.1 m/s, mean  
gradient of 2 mmHg, and aortic   
valve area of 3.23 cm2.  
There is no aortic valve   
regurgitation.  
  
Pulmonic Valve  
The pulmonic valve is normal.  
There is no pulmonic valve   
stenosis.  
There is trace pulmonic   
regurgitation.  
  
Mitral Valve  
The mitral valve has normal   
leaflets.  
There is no mitral valve   
stenosis.  
There is mild to moderate   
mitral valve regurgitation.  
  
Tricuspid Valve  
The tricuspid valve leaflets   
are normal.  
There is no significant   
tricuspid valve stenosis.  
There is trace tricuspid valve   
regurgitation.  
  
  
Pericardium/Pleural  
The pericardium appears   
normal.  
There is no pericardial   
effusion.  
  
Inferior Vena Cava  
Normal inferior vena cava with   
>50% collapse upon inspiration   
consistent  
with normal right atrial   
pressure.  
  
Aorta  
The aortic measurements are   
indexed to age and body   
surface area.  
The aortic root is normal   
measuring 3.49 cm with an   
index of 1.46 cm/m2.  
The proximal ascending aorta   
is normal measuring 3.4 cm   
with an index of  
1.40 cm/m2.  
  
  
Wall Motion Scoring  
Wall Motion Scoring Index:   
1.35  
  
  
Left Ventricular Outflow Tract  
------------------------------  
------------------------------  
----------  
Name Value Normal  
------------------------------  
------------------------------  
----------  
  
LVOT 2D  
------------------------------  
------------------------------  
----------  
LVOT Diameter 2.43 cm  
  
LVOT Doppler  
------------------------------  
------------------------------  
----------  
LVOT Peak Velocity 0.69 m/s  
LVOT Peak Gradient 2 mmHg  
LVOT Mean Gradient 1 mmHg  
LVOT VTI 13.24 cm  
LVOT VTI/AV VTI Ratio 0.69  
LVOT Stroke Volume 61.26 ml  
LVOT Stroke Index 26.99 ml/m2  
  
Pulmonic Valve  
------------------------------  
------------------------------  
----------  
Name Value Normal  
------------------------------  
------------------------------  
----------  
  
PV 2D  
------------------------------  
------------------------------  
----------  
RVOT Diameter (2D) 2.88 cm   
1.70-2.70  
  
RVOT Doppler  
------------------------------  
------------------------------  
----------  
RVOT Peak Velocity 69.93 cm/s  
RVOT Peak Gradient 2 mmHg  
RVOT Mean Gradient 1 mmHg  
RVOT VTI 12.24 cm  
  
PV Doppler  
------------------------------  
------------------------------  
----------  
PV Peak Velocity 0.84 m/s  
PV Peak Gradient 3 mmHg  
PV Mean Gradient 1 mmHg  
PV VTI 18.78 cm  
PV Area (Cont Eq VTI) 4.24 cm2  
PV Area Index (Cont Eq VTI)   
1.77 cm2/m2  
PV Area (Cont Eq Adebayo) 5.43 cm2  
PV Area Index (Cont Eq Adebayo)   
2.27 cm2/m2  
  
Mitral Valve  
------------------------------  
------------------------------  
----------  
Name Value Normal  
------------------------------  
------------------------------  
----------  
  
MV Doppler  
------------------------------  
------------------------------  
----------  
MV Peak Velocity 0.91 m/s  
MV Peak Gradient 3 mmHg  
MV Mean Gradient 1 mmHg  
MV VTI 15.07 cm  
MV Area (Cont Eq VTI) 4.06 cm2  
MV Area Index (Cont Eq VTI)   
1.70 cm2/m2  
  
MV Diastolic Function  
------------------------------  
------------------------------  
----------  
MV E Peak Velocity 0.75 m/s  
  
MV Annular TDI  
------------------------------  
------------------------------  
----------  
MV Septal e' Velocity 6.92   
cm/s >=8.00  
MV E/e' (Septal) 10.81 <=8.00  
MV Lateral e' Velocity 6.63   
cm/s >=10.00  
MV E/e' (Lateral) 11.29 <=8.00  
MV e' Average 6.78  
MV E/e' (Average) 11.05  
  
Tricuspid Valve  
------------------------------  
------------------------------  
----------  
Name Value Normal  
------------------------------  
------------------------------  
----------  
  
Estimated PAP/RSVP  
------------------------------  
------------------------------  
----------  
RA Pressure 8 mmHg <=5  
  
Aorta  
------------------------------  
------------------------------  
----------  
Name Value Normal  
------------------------------  
------------------------------  
----------  
  
Ascending Aorta  
------------------------------  
------------------------------  
----------  
Ao Root Diameter (2D) 3.49 cm   
3.10-3.70  
Ao Root Diam Index (2D) 1.46   
cm/m2 1.50-1.90  
Prox Asc Ao Diameter 3.4 cm   
2.6-3.4  
Prox Asc Ao Diameter Index   
1.40 cm/m2 1.30-1.70  
  
Venous  
------------------------------  
------------------------------  
----------  
Name Value Normal  
------------------------------  
------------------------------  
----------  
  
IVC/SVC  
------------------------------  
------------------------------  
----------  
IVC Diameter (Exp 2D) 2.27 cm   
<=2.10  
  
Aortic Valve  
------------------------------  
------------------------------  
----------  
Name Value Normal  
------------------------------  
------------------------------  
----------  
  
AV Doppler  
------------------------------  
------------------------------  
----------  
AV Peak Velocity 1.1 m/s  
AV Peak Gradient 5 mmHg  
AV Mean Gradient 2 mmHg  
AV VTI 19.10 cm  
AV Area (Cont Eq VTI) 3.23 cm2  
AV Area Index (Cont Eq VTI) 1   
cm2/m2  
AV Area (Cont Eq Adebayo) 2.90 cm2  
AV Area Index (Cont Eq Adebayo) 1   
cm2/m2  
LVOT Vmax/AV Vmax 0.63  
LVOT VTI/AV VTI Ratio 0.69  
  
AV Regurgitation 2D  
------------------------------  
------------------------------  
----------  
LVOT Area 4.63 cm2  
  
Ventricles  
------------------------------  
------------------------------  
----------  
Name Value Normal  
------------------------------  
------------------------------  
----------  
  
LV Dimensions 2D/MM  
------------------------------  
------------------------------  
----------  
IVS Diastolic Thickness (2D)   
1.53 cm 0.60-1.00  
LVID Diastole (2D) 5.15 cm   
4.20-5.80  
LVIW Diastolic Thickness  
(2D) 1.50 cm 0.60-1.00  
LVID Systole (2D) 3.68 cm   
2.50-4.00  
LVOT Diameter 2.43 cm  
LV Mass (2D Cubed) 342 g   
  
LV Mass Index (2D Cubed) 143   
g/m2   
Relative Wall Thickness (2D)   
0.58 <=0.42  
  
LV Fractional   
Shortening/Ejection Fraction   
2D/MM  
------------------------------  
------------------------------  
----------  
LV Fractional Shortening  
(2D) 28 % 25-43  
LV EF (2D Teicholz) 55 % 52-72  
LV Diastolic Volume (4C MOD)   
193 ml  
LV Systolic Volume (4C MOD) 88   
ml  
LV EF (4C MOD) 54 %  
LV Diastolic Volume (2C MOD)   
178 ml  
LV Systolic Volume (2C MOD) 79   
ml  
LV EF (2C MOD) 56 %  
LV Diastolic Volume (BP MOD)   
185.24 ml 62..00  
LV Diastolic Volume Index  
(BP MOD) 77.34 ml/m2   
34.00-74.00  
LV Systolic Volume (BP MOD)   
83.68 ml 21.00-61.00  
LV Systolic Volume Index (BP  
MOD) 34.94 ml/m2 11.00-31.00  
LV EF (BP MOD) 55 % 55-70  
LV Diastolic Length (4C) 8.93   
cm  
LV Systolic Length (4C) 7.75   
cm  
LV Stroke Volume (4C MOD)   
104.87 ml  
  
RV Dimensions 2D/MM  
------------------------------  
------------------------------  
----------  
TAPSE 2.45 cm >=1.70  
  
RV Systolic Function  
------------------------------  
------------------------------  
----------  
RV s' Velocity 2.29 m/s   
0.10-0.19  
  
Atria  
------------------------------  
------------------------------  
----------  
Name Value Normal  
------------------------------  
------------------------------  
----------  
  
LA Dimensions  
------------------------------  
------------------------------  
----------  
LA Minor Axis Width 6.94 cm  
LA Area (4C) 36.77 cm2  
LA Area (2C) 34.51 cm2  
LA Length (2C) 6.78 cm  
LA Volume (4C MOD) 158.17 ml  
LA Volume (2C MOD) 144.63 ml  
LA Volume (4C A-L) 165.21 ml  
LA Volume (2C A-L) 149.28 ml  
LA Volume (BP A-L) 158.93 ml  
LA Volume Index (BP A-L) 66.35   
ml/m2 <=34.00  
LA Volume (BP MOD) 152.92 ml  
LA Volume Index (BP MOD) 63.85   
ml/m2 16.00-34.00  
  
  
Report Signatures  
Finalized by Kraig Gilbert MD on 2020 10:06 AM                                         [facility]  
   
                                                    Otheron 2020   
   
                                                    Stress Nuc   
Stress EF       41 %                                            [facility]  
   
                                                            Patient Info Name: KLAUDIA CLOUD Age: 65 years :   
1955 Gender: Male MRN:   
[id number] Accession #:   
[id number] Ht: 175 cm Wt:   
113 kg BSA: 2.39 m2 HR: 93 bpm   
BP: 157 / 95 mmHg Heart   
Rhythm: Atrial Fibrillation   
Exam Date: 2020 8:00 AM   
Patient Status: Outpatient   
Nuclear Technologist: Katherine Benjamin, RT(N), RANJANA, RENÉE,   
Oz Benjamin RT(N), NCT Exam   
Type: NM MYOCARDIAL PERFUSION   
MULTI SPECT Study Info   
Indications I48.91 -   
Unspecified atrial   
fibrillation Referring   
Physician: DR. BONILLA;   
[id number] Primary Nurse:   
Alessandra Frias RN Supervising   
Stress Physician: Simeon Lewis MD, RPVI BMI: 37.03 kg/m2   
Summary 1. Abnormal treadmill   
stress myocardial perfusion   
study. There is a large   
anterior, apical, septal   
infarct without demonstration   
of significant ischemia. 2. A   
large sized, fixed, moderate   
to absent of uptake perfusion   
defect in the anterior,   
anteroseptal, septal,   
inferior, lateral, and apex   
walls. 3. Resting left   
ventricular ejection fraction   
is mildly reduced , 42 %. 4.   
Septal, apical, inferior wall   
motion abnormalities. 5. All   
other walls appear normal at   
rest. 6. Poor exercise   
tolerance for age. 7. Atrial   
fibrillation throughout study.   
8. No chest discomfort. No ST   
segment changes with exercise.   
1 ventricular triplet.   
History/Risk Factors   
Hypertension: Yes   
Dyslipidemia: Yes Myocardial   
Infarction (MI): Yes Coronary   
Artery Disease (CAD) Yes Date   
of Prior MI: 2014   
Diabetes Mellitus: Yes Tobacco   
Use: Former Family History:   
Coronary Artery Disease Prior   
Interventions EP study 3/7/17,   
CVR 16. Stress ECG   
Details Protocol: MUNDO Total   
METS: 4.6 Rest HR: 93 bpm Peak   
HR: 155 bpm Rest Sys BP: 157   
mmHg Peak Sys BP: 186 mmHg Max   
Pred HR: 154 bpm % Max Pred   
HR: 101 % Target HR: 131 bpm   
Max RPP: 28,830 bpm*mmHg   
Target HR Summary: Patient's   
target heart rate was achieved   
BP Response: Normal blood   
pressure response Termination   
Reason: Fatigue Cardiac   
Symptoms: None Total Time: 2   
min : 21 sec Rest Gold BP: 95   
mmHg Peak Gold BP: 102 mmHg   
Resting ECG Atrial   
fibrillation. Stress ECG No   
abnormal ST/T wave changes   
with exercise. Arrhythmias   
Occasional PVCs. 1 ventricular   
triplet with exercise. Stress   
Summary Poor exercise   
tolerance for age. No chest   
discomfort. No ST segment   
changes with exercise. 1   
ventricular triplet. Atrial   
fibrillation throughout study.   
Radiopharmaceutical: Tc-99m   
Tetrofosmin Administration   
Site: IV - right antecubital   
Administered By: Katherine Benjamin RT(N), CNMT, [facility]   
Camera Used: [employer]   
D-SPECT Radiopharmaceutical:   
Tc-99m Tetrofosmin   
Administration Site: IV -   
right antecubital Administered   
By: Oz Benjamin RT(N), [facility]   
Camera Used: [employer]   
D-SPECT Image Protocol   
Protocol: Stress/Rest 1 Day   
Rest Radiopharmaceutical Dose:   
32.0 mCi Imaging Date & Time:   
2020 9:15 AM Patient   
Position: supine Stress   
Radiopharmaceutical Dose: 10.7   
mCi Imaging Date & Time:    
2020 8:15 AM Patient   
Position: upright and supine   
Injection to Imaging Time: 45   
min Injection to Imaging Time:   
30 min Total Radiation Dose:   
11 mSv Injection Date & Time:    
2020 8:30 AM Injection   
Date & Time: 2020 7:45   
AM Procedure(s): Gated SPECT   
images acquired upright and   
supine post Tetrofosmin   
injection at peak stress.   
Gated SPECT images acquired   
supine post Tetrofosmin   
injection at rest. [facility] [employer]/QI-Pulse application was   
utilized for processing and   
interpretation. SPECT Results   
Perfusion Findings A large   
sized, fixed, moderate to   
absent of uptake perfusion   
defect in the anterior,   
anteroseptal, septal,   
inferior, lateral, and apex   
walls. Summed Difference   
Score: 0 Summed Stress Score:   
22 Summed Rest Score: 23   
Functional Results   
------------------------------  
------------------------------  
---------- Name Value Normal   
------------------------------  
------------------------------  
---------- Stress   
------------------------------  
------------------------------  
---------- Stress LV Ejection   
Fraction 41 % 55-70 Stress LV   
End Diastolic Volume Index   
75.00 ml/m2 Stress LV End   
Systolic Volume Index 39.30   
ml/m2 Nuclear Stress   
Myocardial Mass 196.00 g   
Stress LV End Diastolic Volume   
137.00 ml Stress LV End   
Systolic Volume 80.00 ml   
Transient Ischemic Dilatation    
0.93 Functional Results   
------------------------------  
------------------------------  
---------- Name Value Normal   
------------------------------  
------------------------------  
---------- Rest   
------------------------------  
------------------------------  
---------- Resting LV Ejection   
Fraction 42 % 55-70 Resting LV   
End Diastolic Volume Index    
79.90 ml/m2 Resting LV End   
Systolic Volume Index 43.30   
ml/m2 Resting LV End Diastolic   
Volume 155.00 ml  Resting LV   
End Systolic Volume 90.00 ml   
Nuclear Rest Myocardial Mass   
202.00 g Functional Findings   
The basal anterior, mid   
anterior, and basal   
inferoseptal have mildly   
reduced wall thickening with   
stress. The basal inferior,   
mid inferior, mid   
inferoseptal, and basal   
anteroseptal have moderately   
reduced wall thickening with   
stress. The apical inferior   
has severely reduced wall   
thickening with stress. All   
other walls appear normal with   
stress. The lateral wall,   
inferolateral wall, basal   
anterior, basal inferoseptal,   
mid anterolateral, and basal   
anteroseptal have mildly   
reduced wall thickening at   
rest. The basal inferior, mid   
anterior, apical anterior, and   
mid inferoseptal have   
moderately reduced wall   
thickening at rest. The apex,   
septal wall, mid inferior,   
apical inferior, and mid   
anteroseptal have severely   
reduced wall thickening at   
rest. All other walls appear   
normal at rest. Resting left   
ventricular ejection fraction   
is mildly reduced , 42 %. Peak   
stress left ventricular   
ejection fraction is mildly   
reduced, 41 %. Septal, apical,   
inferior wall motion   
abnormalities. Report   
Signatures MPI SPECT Finalized   
by Simeon Lewis MD on 2020   
03:45 PM Stress Finalized by   
Smieon Lewis MD on 2020   
03:45 PM                                                    [facility], Rad In   
[employer]   
Xper Echopa[facility] - 2020   
3:47 PM EST Patient Info  
Name: NICK CLOUD  
Age: 65 years  
: 1955  
Gender: Male  
MRN: [id number]  
Accession #: [id number]  
Ht: 175 cm  
Wt: 113 kg  
BSA: 2.39 m2  
HR: 93 bpm  
BP: 157  
/ 95 mmHg  
Heart Rhythm: Atrial   
Fibrillation  
Exam Date: 2020 8:00 AM  
Patient Status: Outpatient  
  
Nuclear Technologist: Katherine Benjamin, RT(N), CNMT, RCS,   
Oz Benjamin RT(N), NCT  
  
Exam Type: NM MYOCARDIAL   
PERFUSION MULTI SPECT  
  
Study Info  
Indications  
I48.91 - Unspecified atrial   
fibrillation  
  
Referring Physician: DR. BONILLA;  
  
[id number]  
  
Primary Nurse: Alessandra Frias RN  
  
Supervising Stress Physician:   
Simeon Lewis MD, RPVI  
  
BMI: 37.03 kg/m2  
  
  
Summary  
1. Abnormal treadmill stress   
myocardial perfusion study.   
There is a large  
anterior, apical, septal   
infarct without demonstration   
of significant  
ischemia.  
2. A large sized, fixed,   
moderate to absent of uptake   
perfusion defect in  
the anterior, anteroseptal,   
septal, inferior, lateral, and   
apex walls.  
3. Resting left ventricular   
ejection fraction is mildly   
reduced , 42 %.  
4. Septal, apical, inferior   
wall motion abnormalities.  
5. All other walls appear   
normal at rest.  
6. Poor exercise tolerance for   
age.  
7. Atrial fibrillation   
throughout study.  
8. No chest discomfort. No ST   
segment changes with exercise.   
1 ventricular  
triplet.  
  
  
History/Risk Factors  
Hypertension: Yes  
Dyslipidemia: Yes  
Myocardial Infarction (MI):   
Yes  
Coronary Artery Disease (CAD)   
Yes  
Date of Prior MI: 2014  
Diabetes Mellitus: Yes  
Tobacco Use: Former  
Family History: Coronary   
Artery Disease  
  
  
Prior Interventions  
EP study 3/7/17, CVR 16.  
  
  
Stress ECG Details  
Protocol: MUNDO  
Total METS: 4.6  
Rest HR: 93 bpm  
Peak HR: 155 bpm  
Rest Sys BP: 157 mmHg  
Peak Sys BP: 186 mmHg  
Max Pred HR: 154 bpm  
% Max Pred HR: 101 %  
Target HR: 131 bpm  
Max RPP: 28,830 bpm*mmHg  
Target HR Summary: Patient's   
target heart rate was achieved  
BP Response: Normal blood   
pressure response  
Termination Reason: Fatigue  
Cardiac Symptoms: None  
Total Time: 2 min : 21 sec  
Rest Gold BP: 95 mmHg  
Peak Gold BP: 102 mmHg  
Resting ECG  
Atrial fibrillation.  
Stress ECG  
No abnormal ST/T wave changes   
with exercise.  
Arrhythmias  
Occasional PVCs.  
1 ventricular triplet with   
exercise.  
  
Stress Summary  
Poor exercise tolerance for   
age.  
No chest discomfort. No ST   
segment changes with exercise.   
1 ventricular  
triplet.  
Atrial fibrillation throughout   
study.  
  
  
Radiopharmaceutical: Tc-99m   
Tetrofosmin  
  
Administration Site: IV -   
right antecubital  
  
Administered By: Katherine Benjamin RT(N), CNMT, [facility]  
  
Camera Used: [employer]   
D-SPECT  
  
Radiopharmaceutical: Tc-99m   
Tetrofosmin  
  
Administration Site: IV -   
right antecubital  
  
Administered By: Oz Benjamin RT(N), [facility]  
  
Camera Used: [employer]   
D-SPECT  
  
Image Protocol  
Protocol: Stress/Rest 1 Day  
Rest  
Radiopharmaceutical Dose: 32.0   
mCi  
Imaging Date & Time:   
2020 9:15 AM  
Patient Position: supine  
Stress  
Radiopharmaceutical Dose: 10.7   
mCi  
Imaging Date & Time:   
2020 8:15 AM  
Patient Position: upright and   
supine  
  
Injection to Imaging Time: 45   
min  
  
Injection to Imaging Time: 30   
min  
  
Total Radiation Dose: 11 mSv  
  
Injection Date & Time:   
2020 8:30 AM  
  
Injection Date & Time:   
2020 7:45 AM  
  
  
Procedure(s):  
Gated SPECT images acquired   
upright and supine post   
Tetrofosmin injection at  
peak stress. Gated SPECT   
images acquired supine post   
Tetrofosmin injection at  
rest.  
[facility] [employer]/QPS   
application was utilized for   
processing and  
interpretation.  
  
  
SPECT Results  
Perfusion Findings  
A large sized, fixed, moderate   
to absent of uptake perfusion   
defect in the  
anterior, anteroseptal,   
septal, inferior, lateral, and   
apex walls.  
Summed Difference Score: 0  
Summed Stress Score: 22  
Summed Rest Score: 23  
  
  
Functional Results  
------------------------------  
------------------------------  
----------  
Name Value Normal  
------------------------------  
------------------------------  
----------  
  
Stress  
------------------------------  
------------------------------  
----------  
Stress LV Ejection Fraction 41   
% 55-70  
Stress LV End Diastolic  
Volume Index 75.00 ml/m2  
Stress LV End Systolic  
Volume Index 39.30 ml/m2  
Nuclear Stress Myocardial  
Mass 196.00 g  
Stress LV End Diastolic  
Volume 137.00 ml  
Stress LV End Systolic  
Volume 80.00 ml  
Transient Ischemic  
Dilatation 0.93  
Functional Results  
------------------------------  
------------------------------  
----------  
Name Value Normal  
------------------------------  
------------------------------  
----------  
  
Rest  
------------------------------  
------------------------------  
----------  
Resting LV Ejection Fraction   
42 % 55-70  
Resting LV End Diastolic  
Volume Index 79.90 ml/m2  
Resting LV End Systolic  
Volume Index 43.30 ml/m2  
Resting LV End Diastolic  
Volume 155.00 ml  
Resting LV End Systolic  
Volume 90.00 ml  
Nuclear Rest Myocardial Mass   
202.00 g  
Functional Findings  
The basal anterior, mid   
anterior, and basal   
inferoseptal have mildly   
reduced  
wall thickening with stress.  
The basal inferior, mid   
inferior, mid inferoseptal,   
and basal anteroseptal  
have moderately reduced wall   
thickening with stress.  
The apical inferior has   
severely reduced wall   
thickening with stress.  
All other walls appear normal   
with stress.  
The lateral wall,   
inferolateral wall, basal   
anterior, basal inferoseptal,  
mid anterolateral, and basal   
anteroseptal have mildly   
reduced wall thickening  
at rest.  
The basal inferior, mid   
anterior, apical anterior, and   
mid inferoseptal have  
moderately reduced wall   
thickening at rest.  
The apex, septal wall, mid   
inferior, apical inferior, and   
mid anteroseptal  
have severely reduced wall   
thickening at rest.  
All other walls appear normal   
at rest.  
Resting left ventricular   
ejection fraction is mildly   
reduced , 42 %.  
Peak stress left ventricular   
ejection fraction is mildly   
reduced, 41 %.  
Septal, apical, inferior wall   
motion abnormalities.  
  
  
Report Signatures  
MPI SPECT Finalized by Simeon Lewis MD on 2020 03:45   
PM  
Stress Finalized by Simeon Lewis MD on 2020 03:45 PM                                         [facility]  
   
                                                    ECG 12-LEADon 2020   
   
                      Atrial Rate                                  [facility]  
   
                      P Axis                                      [facility]  
   
                      P-R Interval                                  [facility]  
   
                      Q-T Interval                                  [facility]  
   
                                                    Q-T Interval   
(corrected)                                                     [facility]  
   
                      QRS Duration                                  [facility]  
   
                                                    QTC Calculation   
(Bezet)                                                         [facility]  
   
                      R Axis                                      [facility]  
   
                      T Axis                                      [facility]  
   
                      Ventricular Rate                                  [facility]  
   
                                                    CT Abdomen and Pelvis withou  
t Contraston 2020   
   
                                                    CT Abdomen and   
Pelvis WO   
contrast                                Interpreted by: LISETTE20   
07:12MRN: [id number]   
Name: NICK CLOUD STUDY:CT   
ABDOMEN AND PELVIS WO   
CONTRAST; 2020 6:55 am   
INDICATION:R flank pain.   
COMPARISON:None. ACCESSION   
NUMBER(S):[id number] ORDERING   
CLINICIAN:YVES JOHNSON   
TECHNIQUE:CT of the abdomen   
and pelvis was performed   
without contrast.Standard   
contiguous axial images were   
obtained at 3 mm   
slicethickness through the   
abdomen and pelvis. Coronal   
and sagittalreconstructions at   
3 mm slice thickness were   
performed. FINDINGS:LOWER   
CHEST:The visualized lung base   
is unremarkable. The heart is   
normal in sizewithout   
pericardial effusion. No   
pleural effusion is   
present.Moderate size hiatal   
hernia. ABDOMEN: Limited   
evaluation due to lack of IV   
contrast of all thesolid   
visceral organ, of vascular   
structure, lymph node and   
bowelwall. LIVER:The liver is   
normal in size and attenuation   
no obvious focal lesion. BILE   
DUCTS:The intrahepatic and   
extrahepatic ducts are not   
dilated. GALLBLADDER:No   
calcified gall stone,no   
pericholecystic fluid.   
PANCREAS:The pancreas appears   
unremarkable without evidence   
of ductaldilatation or masses.   
SPLEEN:The spleen is normal in   
size. There is a small   
splenule present. ADRENAL   
GLANDS:Bilateral adrenal   
glands appear normal. KIDNEYS   
AND URETERS:The kidneys are   
normal in size. Mild   
right-sidedhydroureteronephros  
is with a 5 mm stone in the   
right distal ureterjust   
proximal to use the junction.   
There is mild to   
moderateright-sided   
perinephric stranding. 9 mm   
nonobstructing stone in the   
inferior pole adwoa and 3   
mmnonobstructing stone in the   
interpolar region of the left   
kidney. Noleft-sided   
hydroureteronephrosis.5.1 cm   
cyst, in the inferior pole of   
the left kidney. 4.4 cm cyst   
inthe upper pole and 3.1 cm in   
the interpolar region of the   
rightkidney.PELVIS: BLADDER:   
Diffuse thickening of the in a   
bladder wall, may   
likelyrelated to   
underdistention, however   
cystitis cannot be   
excluded,please correlate with   
the urinalysis. REPRODUCTIVE   
ORGANS:No pelvic mass BOWEL:No   
abnormally dilated small or   
large bowel loop. Multiple   
colonicdiverticula in the   
descending colon and sigmoid   
colon no focalchanges to   
suggest diverticulitis. Mild   
stool burden. Appendix   
notidentified with certainty.   
No focal inflammatory changes   
to suggestappendicitis.   
VESSELS: Mild atherosclerotic   
calcified plaque in the   
infrarenalaorta.There is no   
aneurysmal dilatation of the   
abdominal aorta. The   
IVCappears normal.   
PERITONEUM/RETROPERITONEUM/LYM  
PH NODES:No ascites or free   
air, no fluid collection. No   
abdominopelviclymphadenopathy   
is present. BONES AND   
ABDOMINAL WALL: Multilevel   
osteophytic degenerative   
changesNo suspicious osseous   
lesions are identified. Small   
fat containingumbilical   
hernia. IMPRESSION:5 mm   
obstructing stone, in the   
right distal ureter just   
proximal tothe right UV   
junction. Bilateral   
nephrolithiasis. Colonic   
diverticula. Moderate-sized   
hiatal hernia. Appendix is   
within normal limits. No bowel   
obstruction. Multiplecolonic   
diverticula in the ascending   
colon. No evidence   
ofdiverticulitis.Electronicall  
y signed by: LISETTE 20   
07:12               Normal                                  [employer]   
[facility]  
Work Phone:   
[phone]  
21  
   
                                                    Comment on   
above:                                  Ordering Provider: YVES JOHNSON [id number]   
   
                                                    Complete Blood Count + Claudine sevilla 2020   
   
                                                    Erythrocyte   
distribution   
width (RBC)   
[Ratio]         13.4 %                          See Below       [facility] [employer]   
[facility]  
Work Phone:   
[phone]  
21  
   
                                                    Comment on   
above:                                  Reference Range: 11.5 - 14.5   
   
                                                            Ordering Provider: ELIZABETH JOHNSON [id number]   
   
                                                    Hematocrit (Bld)   
[Volume   
fraction]       48.8 %                          See Below       [facility] [employer]   
[facility]  
Work Phone:   
[phone]  
21  
   
                                                    Comment on   
above:                                  Reference Range: 41.0 - 52.0   
   
                                                            Ordering Provider: ELIZABETH  
HAD ALEX Medrano   
   
                                                    Hemoglobin (Bld)   
[Mass/Vol]      17.0 g/dL                       See Below       [facility] [employer]   
[facility]  
Work Phone:   
[phone]  
  
   
                                                    Comment on   
above:                                  Reference Range: 13.5 - 17.5   
   
                                                            Ordering Provider: ELIZABETH  
HAD ALEX Medrano   
   
                                                    MCHC (RBC)   
[Mass/Vol]      34.9 g/dL                       See Below       [facility]   
[facility]  
Work Phone:   
[phone]  
  
   
                                                    Comment on   
above:                                  Reference Range: 32.0 - 36.0   
   
                                                            Ordering Provider: ELIZABEHT  
HAD ALEX Medrano   
   
                                                    MCV (RBC)   
[Entitic vol]   99 fL                           80 - 100        [facility]   
[facility]  
Work Phone:   
[phone]  
  
   
                                                    Comment on   
above:                                  Ordering Provider: YVES Medrano   
   
                                                    Platelets (Bld)   
[#/Vol]         169 {x10E9/L}                   150 - 450       [facility]   
[facility]  
Work Phone:   
[phone]  
  
   
                                                    Comment on   
above:                                  Ordering Provider: YVES Medrano   
   
                                                    RBC (Bld)   
[#/Vol]         4.92 {x10E12/L}                 See Below       [facility]   
[facility]  
Work Phone:   
[phone]  
  
   
                                                    Comment on   
above:                                  Reference Range: 4.50 - 5.90   
   
                                                            Ordering Provider: ELIZABETH  
HAD ALEX Medrano   
   
                                                    WBC (Bld)   
[#/Vol]         9.5 {x10E9/L}                   4.4 - 11.3      [facility]   
[facility]  
Work Phone:   
[phone]  
  
   
                                                    Comment on   
above:                                  Ordering Provider: YVES Medrano   
   
                                                    WBC (Bld)   
[#/Vol]         0.1 {/100_WBC}                                  [facility]   
[facility]  
Work Phone:   
[phone]  
  
   
                                                    Comment on   
above:                                  Ordering Provider: YVES Medrano   
   
                                                    Complete Blood   
Count +   
Differential    SEE MANUAL DIFF                                 [facility]   
[facility]  
Work Phone:   
[phone]  
  
   
                                                    Comment on   
above:                                  Ordering Provider: YVES Medrano   
   
                                                    Hematologyon 2020   
   
                                                    Basophils (Bld)   
[#/Vol]         0.00 {x10E9/L}                  See Below       [facility]   
[facility]  
Work Phone:   
[phone]  
21  
   
                                                    Comment on   
above:                                  Reference Range: 0.00 - 0.10   
   
                                                            Ordering Provider: ELIZABETH  
HAD ALEX [id number]   
   
                                                    Basophils/100   
WBC (Bld)       0.0 %                           0.0 - 2.0       [employer]   
[facility]  
Work Phone:   
[phone]  
  
   
                                                    Comment on   
above:                                  Ordering Provider: YVES ALEX [id number]   
   
                                                    Eosinophils   
(Bld) [#/Vol]   0.00 {x10E9/L}                  See Below       [employer]   
[facility]  
Work Phone:   
[phone]  
  
   
                                                    Comment on   
above:                                  Reference Range: 0.00 - 0.70   
   
                                                            Ordering Provider: ELIZABETH  
HAD ALEX [id number]   
   
                                                    Eosinophils/100   
WBC (Bld)       0.0 %                           0.0 - 6.0       [employer]   
[facility]  
Work Phone:   
[phone]  
  
   
                                                    Comment on   
above:                                  Ordering Provider: YVES MCCULLOUGHMER [address]   
   
                                                    Lymphocytes   
(Bld) [#/Vol]             0.57 {x10E9/L}            below low   
threshold                 See Below                 [employer]   
[facility]  
Work Phone:   
[phone]  
  
   
                                                    Comment on   
above:                                  Reference Range: 1.20 - 4.80   
   
                                                            Ordering Provider: ELIZABETH  
HAD ALEX [id number]   
   
                                                    Lymphocytes/100   
WBC (Bld)       6.0 %                           See Below       [employer]   
[facility]  
Work Phone:   
[phone]  
  
   
                                                    Comment on   
above:                                  Reference Range: 13.0 - 44.0   
   
                                                            Ordering Provider: ELIZABETH  
HAD ALEX [id number]   
   
                                                    Monocytes (Bld)   
[#/Vol]         0.57 {x10E9/L}                  See Below       [employer]   
[facility]  
Work Phone:   
[phone]  
  
   
                                                    Comment on   
above:                                  Reference Range: 0.10 - 1.00   
   
                                                            Ordering Provider: ELIZABETH  
HAD ALEX [id number]   
   
                                                    Monocytes/100   
WBC (Bld)       6.0 %                           2.0 - 10.0      [employer]   
[facility]  
Work Phone:   
[phone]  
  
   
                                                    Comment on   
above:                                  Ordering Provider: YVES Medrano   
   
                                                    Hepatic Function Panelon    
   
                                                    Albumin BCP dye   
[Mass/Vol]      4.2 g/dL                        3.4 - 5.0       [facility]   
[facility]  
Work Phone:   
[phone]  
  
   
                                                    Comment on   
above:                                  Ordering Provider: YVES Medrano   
   
                                                    ALP [Catalytic   
activity/Vol]   63 U/L                          33 - 136        [facility] [place] [place]  
Work Phone:   
[phone]  
  
   
                                                    Comment on   
above:                                  Ordering Provider: YVES Medrano   
   
                                                    ALT With P-5'-P   
[Catalytic   
activity/Vol]   45 U/L                          10 - 52         [facility] [place] [place]  
Work Phone:   
[phone]  
  
   
                                                    Comment on   
above:                                  Patients treated with Sulfasalazine may   
generate falsely decreased   
results for ALT.   
   
                                                            Ordering Provider: C  
HAD ALEX Medrano   
   
                                                    AST With P-5'-P   
[Catalytic   
activity/Vol]   28 U/L                          9 - 39          [facility] [place] [place]  
Work Phone:   
[phone]  
  
   
                                                    Comment on   
above:                                  Ordering Provider: YVES Medrano   
   
                                                    Bilirubin   
[Mass/Vol]                1.6 mg/dL                 above high   
threshold                 0.0 - 1.2                 [facility] [place] [place]  
Work Phone:   
[phone]  
  
   
                                                    Comment on   
above:                                  Ordering Provider: YVES Medrano   
   
                                                    Bilirubin.direct   
[Mass/Vol]      0.3 mg/dL                       0.0 - 0.3       [facility] [place] [place]  
Work Phone:   
[phone]  
  
   
                                                    Comment on   
above:                                  Ordering Provider: YVES Medrano   
   
                                                    Protein   
[Mass/Vol]      6.9 g/dL                        6.4 - 8.2       [facility] [place] [place]  
Work Phone:   
[phone]  
  
   
                                                    Comment on   
above:                                  Ordering Provider: YVES Medrano   
   
                                                    Lipase, Serumon 2020   
   
                                                    Lipase   
[Catalytic   
activity/Vol]   20 U/L                          9 - 82          [facility] [place] [place]  
Work Phone:   
[phone]  
  
   
                                                    Comment on   
above:                                  Venipuncture immediately after or during  
 the administration of   
Metamizole may lead to falsely low results. Testing should be   
performed immediately prior to Metamizole dosing.   
N-acetyl-p-benzoquinone imine (metabolite of Acetaminophen) will   
generate erroneously low results in samples for patients that have   
taken toxic doses of acetaminophen.   
   
                                                            Ordering Provider: C  
HAD ALEX Medrano   
   
                                                    Metabolic Panelon 2020  
   
   
                                                    Anion gap   
[Moles/Vol]     12 mmol/L                       10 - 20         [facility] [place] [place]  
Work Phone:   
[phone]  
  
   
                                                    Comment on   
above:                                  Ordering Provider: YVES Medrano   
   
                                                    Calcium   
[Mass/Vol]      9.8 mg/dL                       8.6 - 10.3      [facility]   
[facility]  
Work Phone:   
[phone]  
  
   
                                                    Comment on   
above:                                  Ordering Provider: YVES Medrano   
   
                                                    Chloride   
[Moles/Vol]     105 mmol/L                      98 - 107        [facility] [place] [place]  
Work Phone:   
[phone]  
  
   
                                                    Comment on   
above:                                  Ordering Provider: YVES Medrano   
   
                      CO2 [Moles/Vol] 24 mmol/L             21 - 32    [facility]  
Work Phone:   
[phone]  
  
   
                                                    Comment on   
above:                                  Ordering Provider: YVES Medrano   
   
                                                    Creatinine   
[Mass/Vol]      1.30 mg/dL                      See Below       [facility] [place] [place]  
Work Phone:   
[phone]  
  
   
                                                    Comment on   
above:                                  Reference Range: 0.50 - 1.30   
   
                                                            Ordering Provider: ELIZABETH Medrano   
   
                                                    Glucose   
[Mass/Vol]                209 mg/dL                 above high   
threshold                 74 - 99                   [facility] [place] [place]  
Work Phone:   
[phone]  
  
   
                                                    Comment on   
above:                                  Ordering Provider: YVES Medrano   
   
                                                    Potassium   
[Moles/Vol]     4.2 mmol/L                      3.5 - 5.3       [facility] [place] [place]  
Work Phone:   
[phone]  
  
   
                                                    Comment on   
above:                                  Ordering Provider: YVES Medrano   
   
                                                    Sodium   
[Moles/Vol]     137 mmol/L                      136 - 145       [facility] [place] [place]  
Work Phone:   
[phone]  
  
   
                                                    Comment on   
above:                                  Ordering Provider: YVES Medrano   
   
                                                    Urea nitrogen   
[Mass/Vol]      21 mg/dL                        6 - 23          [facility] [place] [place]  
Work Phone:   
[phone]  
  
   
                                                    Comment on   
above:                                  Ordering Provider: YVES Medrano   
   
                                                    Otheron 2020   
   
                                                    Segmented   
neutrophils/100   
WBC (Bld)       87.0 %                          See Below       [facility] [place] [place]  
Work Phone:   
[phone]  
  
   
                                                    Comment on   
above:                                  Reference Range: 40.0 - 80.0 Percent dif  
ferential counts (%) should   
be interpreted in the context of the absolute cell counts (cells/L).   
   
                                                            Ordering Provider: ELIZABETH Medrano   
   
                                 67 {mL/min/1.73m2}            >60        [facility]  
Work Phone:   
[phone]  
  
   
                                                    Comment on   
above:                                  CALCULATIONS OF ESTIMATED GFR ARE PERFOR  
MED USING THE MDRD STUDY   
EQUATION FOR THE IDMS-TRACEABLE CREATININE METHODS. CLIN CHEM   
2007;53:766-72   
   
                                                            Ordering Provider: ELIZABETH Medrano   
   
                                 NORMAL                           [facility] [place] [place]  
Work Phone:   
[phone]  
  
   
                                                    Comment on   
above:                                  Ordering Provider: YVES Medrano   
   
                                 55 {mL/min/1.73m2} Abnormal   >60        [facility] [place]  
Work Phone:   
[phone]  
  
   
                                                    Comment on   
above:                                  Ordering Provider: YVES Medrano   
   
                                 1.0 %                 0.0 - 2.0  [facility] [place] [place]  
Work Phone:   
[phone]  
  
   
                                                    Comment on   
above:                                  Ordering Provider: YVES Medrano   
   
                                                8.27 {x10E9/L}  above high   
threshold                 See Below                 [facility] [place] [place]  
Work Phone:   
[phone]  
  
   
                                                    Comment on   
above:                                  Reference Range: 1.20 - 7.70   
   
                                                            Ordering Provider: ELIZABETH Medrano   
   
                                                            Reference Range: 1.2  
0 - 7.00   
   
                                 0.10 {x10E9/L}            See Below  [facility] [place] [place]  
Work Phone:   
[phone]  
  
   
                                                    Comment on   
above:                                  Reference Range: 0.00 - 0.50   
   
                                                            Ordering Provider: ELIZABETH Medrano   
   
                                                    Urinalysison 2020   
   
                      Appearance (U) CLEAR                 CLEAR      [facility] [place] [place]  
Work Phone:   
[phone]  
  
   
                                                    Comment on   
above:                                  Ordering Provider: YVES Medrano   
   
                      Color (U)  Yellow                See Below  [facility] [place] [place]  
Work Phone:   
[phone]  
21  
   
                                                    Comment on   
above:                                  Reference Range: STRAW,YELLOW   
   
                                                            Ordering Provider: ELIZABETH Medrano   
   
                      Glucose Ql (U) 50(TRACE)  Abnormal   NEGATIVE   [facility] [place] [place]  
Work Phone:   
[phone]  
  
   
                                                    Comment on   
above:                                  Ordering Provider: YVES Medrano   
   
                      Ketones Ql (U) Negative              NEGATIVE   [facility] [place] [place]  
Work Phone:   
[phone]  
21  
   
                                                    Comment on   
above:                                  Ordering Provider: YVES Medrano   
   
                                                    Leukocyte   
esterase Test   
strip Ql (U)    Negative                        NEGATIVE        [facility] [place] [place]  
Work Phone:   
[phone]  
21  
   
                                                    Comment on   
above:                                  Ordering Provider: YVES Medrano   
   
                      pH (U)     7.0 [pH]              5.0 - 8.0  [facility] [place] [place]  
Work Phone:   
[phone]  
  
   
                                                    Comment on   
above:                                  Ordering Provider: YVES Medrano   
   
                                                    Protein (U)   
[Mass/Vol]      30(1+)          Abnormal        NEGATIVE        [facility] [place] [place]  
Work Phone:   
[phone]  
  
   
                                                    Comment on   
above:                                  Ordering Provider: YVES Medrano   
   
                      RBC (U) [#/Vol] SMALL(1+)  Abnormal   NEGATIVE   [facility]   
[employer]   
[facility]  
Work Phone:   
[phone]  
  
   
                                                    Comment on   
above:                                  Ordering Provider: YVES Medrano   
   
                                                    Specific gravity   
(U) [Rel   
density]        1.021 1                         See Below       [facility] [place] [place]  
Work Phone:   
[phone]  
  
   
                                                    Comment on   
above:                                  Reference Range: 1.005 - 1.035   
   
                                                            Ordering Provider: ELIZABETH IBARRA ALEX [address]   
   
                      Urinalysis Negative              NEGATIVE   [facility] [place] [place]  
Work Phone:   
[phone]  
  
   
                                                    Comment on   
above:                                  Ordering Provider: YVES Medrano   
   
                                Urinalysis      4.0 mg/dL       above high   
threshold                 0.0 - 1.9                 [facility] [place] [place]  
Work Phone:   
[phone]  
  
   
                                                    Comment on   
above:                                  SOME PIGMENTS AND MEDICATIONS MAY CAUSE   
AFALSE POSITIVE UROBILINOGEN   
   
                                                            Ordering Provider: ELIZABETH IBARRA ALEX [address]   
   
                                                    Urinalysis, Microscopicon    
   
                                                    Urinalysis,   
Microscopic     1+                                              [facility] [place] [place]  
Work Phone:   
[phone]  
  
   
                                                    Comment on   
above:                                  Ordering Provider: YVES JOHNSON [id number]   
   
                                                    Urinalysis,   
Microscopic     <1                                              [facility] [place] [place]  
Work Phone:   
[phone]  
21  
   
                                                    Comment on   
above:                                  Ordering Provider: YVES MCCULLOUGHMER [address]   
   
                                                    Urinalysis,   
Microscopic     7 {/HPF}        Abnormal        0-5             [facility] [place] [place]  
Work Phone:   
[phone]  
21  
   
                                                    Comment on   
above:                                  Ordering Provider: YVES JOHNSON [address]   
   
                                                    Urinalysis,   
Microscopic     1 {/HPF}                        0-5             [facility] [place] [place]  
Work Phone:   
[phone]  
21  
   
                                                    Comment on   
above:                                  Ordering Provider: YVES JOHNSON [address]   
   
                                                    Auto Diffon 2018   
   
                                                    Basophils #/vol   
(Bld)           0.1 E3/mcL      Normal          0.0-0.2         [facility]  
   
                                                    Comment on   
above:                                  Order Comment: Order Added by Discern Ex  
pert.   
   
                                                            Performed By: #### 2  
[id number] ####  
KIERA RemHemo  
[address] [place] [address]   
   
                                                    Basophils/100   
WBC (Bld)       1.0 %           Normal          0.0-2.0         [facility]  
   
                                                    Comment on   
above:                                  Order Comment: Order Added by Discern Ex  
pert.   
   
                                                            Performed By: #### 2  
[id number] ####  
KIERA RemHemo  
[address] [place] [address]   
   
                      Eos Absolute 0.2 E3/mcL Normal     0.0-0.7    [facility]  
   
                                                    Comment on   
above:                                  Order Comment: Order Added by Discern Ex  
pert.   
   
                                                            Performed By: #### 2  
[id number] ####  
KIERA RemHemo  
[address] [place] [address]   
   
                                                    Eosinophils/100   
WBC (Bld)       3.1 %           Normal          0.0-11.0        [facility]  
   
                                                    Comment on   
above:                                  Order Comment: Order Added by Discern Ex  
pert.   
   
                                                            Performed By: #### 2  
[id number] ####  
KIERA RemHemo  
[address] [place] [address]   
   
                                                    Lymphocytes   
#/vol (Bld)     1.6 E3/mcL      Normal          1.2-3.4         [facility]  
   
                                                    Comment on   
above:                                  Order Comment: Order Added by Discern Ex  
pert.   
   
                                                            Performed By: #### 2  
[id number] ####  
KIERA RemHemo  
[address] [place] [address]   
   
                                                    Lymphocytes/100   
WBC (Bld)       23.5 %          Normal          20.0-55.0       [facility]  
   
                                                    Comment on   
above:                                  Order Comment: Order Added by Discern Ex  
pert.   
   
                                                            Performed By: #### 2  
[id number] ####  
KIERA RemHemo  
[address] [place] [address]   
   
                      Mono Absolute 0.6 E3/mcL Normal     0.0-0.7    [facility]  
   
                                                    Comment on   
above:                                  Order Comment: Order Added by Discern Ex  
pert.   
   
                                                            Performed By: #### 2  
[id number] ####  
KIERA RemHemo  
[address] [place] [address]   
   
                                                    Monocytes/100   
WBC (Bld)       9.3 %           Normal          0.0-10.0        [facility]  
   
                                                    Comment on   
above:                                  Order Comment: Order Added by Discern Ex  
pert.   
   
                                                            Performed By: #### 2  
[id number] ####  
KIERA ScalesHemo  
[address] [place] [address]   
   
                      Neutro Absolute 4.2 E3/mcL Normal     1.4-6.5    [facility]  
   
                                                    Comment on   
above:                                  Order Comment: Order Added by Discern Ex  
pert.   
   
                                                            Performed By: #### 2  
[id number] ####  
KIERA ScalesHemo  
[address] [place] [id number]   
   
                      Neutro Auto 63.1 %     Normal     37.0-75.0  [facility]  
   
                                                    Comment on   
above:                                  Order Comment: Order Added by Discern Ex  
pert.   
   
                                                            Performed By: #### 2  
[id number] ####  
KIERA ScalesHemo  
[facility] [place] [address]   
   
                                                    CBC w/ Auto Diffon   
8   
   
                                                    Erythrocyte   
distribution   
width Ratio   
(RBC)           13.7 %          Normal          11.5-14.5       [facility]  
   
                                                    Comment on   
above:                                  Performed By: #### [id number] ####  
KIERA RemHemo  
[address] [place] [id number]   
   
                                                    Hematocrit   
Volume Fraction   
(Bld)           46.1 %          Normal          42.0-52.0       [facility]  
   
                                                    Comment on   
above:                                  Performed By: #### [id number] ####  
KIERA ScalesHemo  
[facility] [place] [address]   
   
                                                    Hemoglobin mass   
conc (Bld)      16.2 g/dL       Normal          13.5-18.0       [facility]  
   
                                                    Comment on   
above:                                  Performed By: #### [id number] ####  
KIERA RemHemo  
[address] [place] [address]   
   
                                                    MCH Entitic mass   
(RBC)           34.3 pg         High            27.0-31.0       [facility]  
   
                                                    Comment on   
above:                                  Performed By: #### [id number] ####  
KIERA RemHemo  
[address] [place] [address]   
   
                                                    MCHC mass conc   
(RBC)           35.2 g/dL       Normal          33.0-37.0       [facility]  
   
                                                    Comment on   
above:                                  Performed By: #### [id number] ####  
KIERA RemHemo  
[address] [place] [id number]   
   
                                                    MCV Entitic   
volume (RBC)    97.5 fL         Normal          78.0-100.0      [facility]  
   
                                                    Comment on   
above:                                  Performed By: #### [id number] ####  
KIERA Collazoo  
[facility] [place] [id number]   
   
                                                    Platelet mean   
volume Entitic   
volume (Bld)    7.9 fL          Normal          7.4-11.0        [facility]  
   
                                                    Comment on   
above:                                  Performed By: #### [id number] ####  
KIERA Collazoo  
[facility] [place] [id number]   
   
                                                    Platelets #/vol   
(Bld)           192 E3/mcL      Normal          130-400         [facility]  
   
                                                    Comment on   
above:                                  Performed By: #### [id number] ####  
KIERA ScalesHemo  
[facility] [place] [id number]   
   
                      RBC #/vol (Bld) 4.72 E6/mcL Normal     3.90-6.10  [facility]  
   
                                                    Comment on   
above:                                  Performed By: #### [id number] ####  
KIERA Collazoo  
[address] [place], [place] [address]   
   
                      WBC #/vol (Bld) 6.6 E3/mcL Normal     3.6-11.0   [facility]  
   
                                                    Comment on   
above:                                  Performed By: #### [id number] ####  
KIERA Collazoo  
[address] [place], [place] [address]   
   
                                                    CMPon 2018   
   
                                                    Albumin mass   
conc            4.2 g/dL        Normal          3.2-5.0         [facility]  
   
                                                    Comment on   
above:                                  Performed By: #### [id number] ####  
KIERA Fernandez  
[address] [place], [place] [address]   
   
                                                    Albumin/Globulin   
mass ratio      1.4 {ratio}     Normal          1.1-1.9         [facility]  
   
                                                    Comment on   
above:                                  Performed By: #### [id number] ####  
KIERA ScaelsAlanna  
[facility] [place] [address]   
   
                      Alk Phos   65 Int._Unit/L Normal          [facility]  
   
                                                    Comment on   
above:                                  Performed By: #### [id number] ####  
KIERA ScalesAlanna  
[facility] [place] [id number]   
   
                                                    ALT enzyme   
act/vol         52 Int._Unit/L  High            10-40           [facility]  
   
                                                    Comment on   
above:                                  Performed By: #### [id number] ####  
[address] [place] [address]   
   
                                                    AST enzyme   
act/vol         33 Int._Unit/L  Normal          10-42           [facility]  
   
                                                    Comment on   
above:                                  Performed By: #### [id number] ####  
KIERA Scales[address] [address]   
   
                      Bili Total 1.4 mg/dL  High       0.2-1.0    [facility]  
   
                                                    Comment on   
above:                                  Performed By: #### [id number] ####  
KIERABE Scales[address] [address]   
   
                                                    Calcium mass   
conc            9.4 mg/dL       Normal          8.4-10.2        [facility]  
   
                                                    Comment on   
above:                                  Performed By: #### [id number] ####  
[facility] [address]   
   
                                                    Chloride molar   
conc            109 mmol/L      High                      [facility]  
   
                                                    Comment on   
above:                                  Performed By: #### [id number] ####  
KIERABE Scales[address] [address]   
   
                      CO2 molar conc 28.3 mmol/L Normal     24.0-30.0  [facility]  
   
                                                    Comment on   
above:                                  Performed By: #### [id number] ####  
KIERABE Scales[address] [address]   
   
                                                    Creatinine mass   
conc            1.0 mg/dL       Normal          0.6-1.3         [facility]  
   
                                                    Comment on   
above:                                  Performed By: #### [id number] ####  
KIERABE Scales[address] [address]   
   
                                                    Globulin mass   
conc (S)        3.1 g/dL        Normal          2.0-4.0         [facility]  
   
                                                    Comment on   
above:                                  Performed By: #### [id number] ####  
[facility]ABE [employer]  
[address] [place] [address]   
   
                                                    Glucose mass   
conc            105 mg/dL       High            70-99           [facility]  
   
                                                    Comment on   
above:                                  Performed By: #### [id number] ####  
[facility]  
[address] [place] [address]   
   
                                                    Potassium molar   
conc            4.3 mmol/L      Normal          3.5-5.1         [facility]  
   
                                                    Comment on   
above:                                  Performed By: #### [id number] ####  
KIERA [facility]  
[address] [place] [address]   
   
                                                    Protein mass   
conc            7.3 g/dL        Normal          6.4-8.3         [facility]  
   
                                                    Comment on   
above:                                  Performed By: #### [id number] ####  
KIERA [facility]  
[facility] [place] [address]   
   
                                                    Sodium molar   
conc            143 mmol/L      Normal          136-145         [facility]  
   
                                                    Comment on   
above:                                  Performed By: #### [id number] ####  
[facility]ERA [facility]  
[facility] [place] [address]   
   
                                                    Urea nitrogen   
mass conc       14 mg/dL        Normal          7-18            [facility]  
   
                                                    Comment on   
above:                                  Performed By: #### [id number] ####  
[facility]ERA [facility]  
[facility] [place] [address]   
   
                                                    Urea   
nitrogen/Creatin  
ine mass ratio  14.0 ratio      Normal          5.4-30.0        [facility]  
   
                                                    Comment on   
above:                                  Performed By: #### [id number] ####  
KIERA [facility]  
[address] [place], [place] [address]   
   
                                                    [id number] 2018   
   
                                                    Hemoglobin   
A1c/Hemoglobin.t  
otal mass   
fraction (Bld)  5.2 %           Normal          4.0-6.3         [facility]  
   
                                                    Comment on   
above:                                  Performed By: #### [id number] ####  
KIERA [facility]  
[address] [place], [place] [address]   
   
                                                    eGFRon 2018   
   
                                                    GFR/1.73 sq M   
predicted among   
non-blacks MDRD   
vol rate/area   
(S/P/Bld)       mL/min/{1.73_m2} Normal                          [facility]  
   
                                                    Comment on   
above:                                  Order Comment: Order added by Discern Ex  
pert.   
   
                                                            Performed By: #### 1  
[id number] ####  
[facility]ERA [facility]  
[address] [place] [address]   
  
  
  
Vital Signs  
  
  
                          Date Time    Vital Sign   Value        Performing   
Clinician                               Facility  
   
                                                    2024   
09:          Body height         176.5 cm            Jason Bonilla MD  
Work Phone:   
[phone]                          [facility]  
   
                                                    2024   
09:                              Body mass index   
(BMI) [Ratio]             37.9 kg/m2                Jason Bonilla MD  
Work Phone:   
[phone]                          [facility]  
   
                                                    2024   
09:          Body weight         118.12 kg           Jason Bonilla MD  
Work Phone:   
[phone]                          [facility]  
   
                                                    2024   
09:                              Diastolic blood   
pressure                  74 mm[Hg]                 Jason Bonilla MD  
Work Phone:   
[phone]                          [facility]  
   
                                                    2024   
09:          Heart rate          84 /min             Jason Bonilla MD  
Work Phone:   
[phone]                          [facility]  
   
                                                    2024   
09:                              SaO2% (BldA) [Mass   
fraction]                 95 %                      Jason Bonilla MD  
Work Phone:   
[phone] [place] [address]  
   
                                                    2024   
09:                              Systolic blood   
pressure                  128 mm[Hg]                Jason Bonilla MD  
Work Phone:   
[phone] [place] [address]  
   
                                                    10-   
10:          Body height         176.5 cm            Jason Bonilla MD  
Work Phone:   
[phone] [place] [address]  
   
                                                    10-   
10:                              Body mass index   
(BMI) [Ratio]             37.61 kg/m2               Jason Bonilla MD  
Work Phone:   
[phone] [place] [address]  
   
                                                    10-   
10:          Body weight         117.21 kg           Jason Bonilla MD  
Work Phone:   
[phone] [place] [address]  
   
                                                    10-   
10:                              Diastolic blood   
pressure                  80 mm[Hg]                 Jason Bonilla MD  
Work Phone:   
[phone] [place] [address]  
   
                                                    10-   
10:          Heart rate          77 /min             Jason Bonilla MD  
Work Phone:   
[phone]                          [facility]  
   
                                                    10-   
10:                              SaO2% (BldA) [Mass   
fraction]                 96 %                      Jason Bonilla MD  
Work Phone:   
[phone]                          [facility]  
   
                                                    10-   
10:                              Systolic blood   
pressure                  138 mm[Hg]                Jason Bonilla MD  
Work Phone:   
[phone] [place] [address]  
   
                                                    2024   
08:          Body height         175.3 cm            Mundo Cortez MD  
Work Phone:   
[phone]                          [facility]  
   
                                                    2024   
08:                              Body mass index   
(BMI) [Ratio]             36.18 kg/m2               Mundo Cortez MD  
Work Phone:   
[phone]                          [facility]  
   
                                                    2024   
08:          Body weight         111.13 kg           Mundo Cortez MD  
Work Phone:   
[phone]                          [facility]  
   
                                                    2024   
08:                              Diastolic blood   
pressure                  102 mm[Hg]                Mundo Cortez MD  
Work Phone:   
[phone]                          [facility]  
   
                                                    2024   
08:          Heart rate          73 /min             Mundo Cortez MD  
Work Phone:   
[phone]                          [facility]  
   
                                                    2024   
08:                              Systolic blood   
pressure                  144 mm[Hg]                Mundo Cortez MD  
Work Phone:   
[phone]                          [facility]  
   
                                                    2023   
09:                              Diastolic blood   
pressure                  90 mm[Hg]                 Kenton Hinojosa Jr. DPM  
Work Phone:   
[phone]                          [facility]  
   
                                                    2023   
09:          Heart rate          85 /min             Kenton Hinojosa Jr., DPM  
Work Phone:   
[phone]                          [facility]  
   
                                                    2023   
09:                              Systolic blood   
pressure                  135 mm[Hg]                Kenton Hinojosa Jr., DPM  
Work Phone:   
[phone]                          [facility]  
   
                                                    2023   
09:          Body temperature    97.3 [degF]         Kenton Hinojosa Jr., DPM  
Work Phone:   
[phone]                          [facility]  
   
                                                    2023   
15:          Body height         180.3 cm            Jason Bonilla MD  
Work Phone:   
[phone]                          [facility]  
   
                                                    2023   
15:                              Body mass index   
(BMI) [Ratio]             35.22 kg/m2               Jason Bonilla MD  
Work Phone:   
[phone]                          [facility]  
   
                                                    2023   
15:          Body weight         114.53 kg           Jason Bonilla MD  
Work Phone:   
[phone]                          [facility]  
   
                                                    2023   
15:                              Diastolic blood   
pressure                  62 mm[Hg]                 Jason Bonilla MD  
Work Phone:   
[phone]                          [facility]  
   
                                                    2023   
15:          Heart rate          81 /min             Jason Bonilla MD  
Work Phone:   
[phone]                          [facility]  
   
                                                    2023   
15:                              SaO2% (BldA) [Mass   
fraction]                 96 %                      Jason Bonilla MD  
Work Phone:   
[phone]                          [facility]  
   
                                                    2023   
15:                              Systolic blood   
pressure                  138 mm[Hg]                Jason Bonilla MD  
Work Phone:   
[phone]                          [facility]  
   
                                                    2023   
14:          Body height         175.3 cm            Rj Raines MD  
Work Phone:   
[phone]                          [facility]  
   
                                                    2023   
15:          Body height         180.3 cm            Jason Bonilla MD  
Work Phone:   
[phone]                          [facility]  
   
                                                    2023   
15:                              Body mass index   
(BMI) [Ratio]             36.56 kg/m2               Jason Bonilla MD  
Work Phone:   
[phone]                          [facility]  
   
                                                    2023   
15:          Body weight         118.89 kg           Jason Bonilla MD  
Work Phone:   
[phone]                          [facility]  
   
                                                    2023   
15:                              Diastolic blood   
pressure                  78 mm[Hg]                 Jason Bonilla MD  
Work Phone:   
[phone]                          [facility]  
   
                                                    2023   
15:          Heart rate          64 /min             Jason Bonilla MD  
Work Phone:   
[phone]                          [facility]  
   
                                                    2023   
15:                              SaO2% (BldA) [Mass   
fraction]                 96 %                      Jason Bonilla MD  
Work Phone:   
[phone]                          [facility]  
   
                                                    2023   
15:                              Systolic blood   
pressure                  128 mm[Hg]                Jason Bonilla MD  
Work Phone:   
[phone]                          [facility]  
   
                                                    2023   
15:          Body height         175.3 cm            Rj Raines MD  
Work Phone:   
[phone]                          [facility]  
   
                                                    2023   
15:                              Body mass index   
(BMI) [Ratio]             39.43 kg/m2               Rj Raines MD  
Work Phone:   
[phone]                          [facility]  
   
                                                    2023   
15:          Body weight         121.11 kg           Rj Raines MD  
Work Phone:   
[phone]                          [facility]  
   
                                                    2023   
16:          Body temperature    98.4 [degF]         Kenton Ashish   
Jr., DPM  
Work Phone:   
[phone]                          [facility]  
   
                                                    2023   
16:                              Diastolic blood   
pressure                  75 mm[Hg]                 Kenton Ashish   
Jr., DPM  
Work Phone:   
[phone]                          [facility]  
   
                                                    2023   
16:          Heart rate          72 /min             Kenton Ashish   
Jr., DPM  
Work Phone:   
[phone]                          [facility]  
   
                                                    2023   
16:                              Systolic blood   
pressure                  110 mm[Hg]                Kenton Ashish   
Jr., DPM  
Work Phone:   
[phone]                          [facility]  
   
                                                    2023   
16:          Body temperature    98.2 [degF]         Kenton Ashish   
Jr., DPM  
Work Phone:   
[phone]                          [facility]  
   
                                                    2023   
16:                              Diastolic blood   
pressure                  85 mm[Hg]                 Kenton Ashish   
Jr., DPM  
Work Phone:   
[phone]                          [facility]  
   
                                                    2023   
16:          Heart rate          91 /min             Kenton Ashish   
Jr., DPM  
Work Phone:   
[phone]                          [facility]  
   
                                                    2023   
16:                              Systolic blood   
pressure                  124 mm[Hg]                Kenton Ashish   
Jr., DPM  
Work Phone:   
[phone]                          [facility]  
   
                                                    2023   
16:          Body height         180.34 cm           Jason Bonilla  
Work Phone:   
[phone]                          [facility]   
[facility]   
[facility]  
Work Phone:   
[phone]  
   
                                                    2023   
16:                              Body mass index   
(BMI) [Ratio]             36.59 kg/m2               Jason Bonilla  
Work Phone:   
[phone]                          [facility]   
[facility]  
Work Phone:   
[phone]  
   
                                                    2023   
16:                              Body surface area   
Derived from formula      2.37 m2                   Jason Bonilla  
Work Phone:   
[phone]                          [employer]   
[facility]  
Work Phone:   
[phone]  
   
                                                    2023   
16:          Body weight         119.02 kg           Jason Bonilla  
Work Phone:   
[phone]                          [employer]   
[facility]  
Work Phone:   
[phone]  
   
                                                    2023   
16:                              Diastolic blood   
pressure                  70 mm[Hg]                 Jason Bonilla  
Work Phone:   
[phone]                          [employer]   
[facility]  
Work Phone:   
[phone]  
   
                                                    2023   
16:          Heart rate          74 /min             Jason Bonilla  
Work Phone:   
[phone]                          [employer] [facility]  
Work Phone:   
[phone]  
   
                                                    2023   
16:                              SaO2% (BldA) [Mass   
fraction]                 98 %                      Jason Bonilla  
Work Phone:   
[phone]                          [facility]  
Work Phone:   
[phone]  
   
                                                    2023   
16:                              Systolic blood   
pressure                  110 mm[Hg]                Jason Bonilla  
Work Phone:   
[phone]                          [employer]   
[facility]   
[facility]  
Work Phone:   
[phone]  
   
                                                    2023   
14:          Body height         175.3 cm            Mundo Cortez MD  
Work Phone:   
[phone]                          [facility]  
   
                                                    2023   
14:                              Body mass index   
(BMI) [Ratio]             39.43 kg/m2               Mundo Cortez MD  
Work Phone:   
[phone]                          [facility]  
   
                                                    2023   
14:          Body weight         121.11 kg           Mundo Cortez MD  
Work Phone:   
[phone]                          [facility]  
   
                                                    2023   
14:                              Diastolic blood   
pressure                  76 mm[Hg]                 Mundo Cortez MD  
Work Phone:   
[phone]                          [facility]  
   
                                                    2023   
14:          Heart rate          78 /min             Mundo Cortez MD  
Work Phone:   
[phone]                          [facility]  
   
                                                    2023   
14:                              Systolic blood   
pressure                  114 mm[Hg]                Mundo Cortez MD  
Work Phone:   
[phone]                          [facility]  
   
                                                    2022   
16:          Body height         180.34 cm           Jason Bonilla  
Work Phone:   
[phone]                          [facility]   
[facility]   
[facility]  
Work Phone:   
[phone]  
   
                                                    2022   
16:                              Body mass index   
(BMI) [Ratio]             36.06 kg/m2               Jason Bonilla  
Work Phone:   
[phone]                          [facility]   
[facility]  
Work Phone:   
[phone]  
   
                                                    2022   
16:                              Body surface area   
Derived from formula      2.35 m2                   Jason Bonilla  
Work Phone:   
[phone]                          [facility]   
[facility]  
Work Phone:   
[phone]  
   
                                                    2022   
16:          Body weight         117.28 kg           Jason Bonilla  
Work Phone:   
[phone]                          [facility]   
[facility]  
Work Phone:   
[phone]  
   
                                                    2022   
16:                              Diastolic blood   
pressure                  66 mm[Hg]                 Jason Bonilla  
Work Phone:   
[phone]                          [facility]   
[facility]   
[facility]  
Work Phone:   
[phone]  
   
                                                    2022   
16:          Heart rate          74 /min             Jason Bonilla  
Work Phone:   
[phone]                          [facility]   
[facility]  
Work Phone:   
[phone]  
   
                                                    2022   
16:                              SaO2% (BldA) [Mass   
fraction]                 97 %                      Jason Bonilla  
Work Phone:   
[phone]                          [facility]   
[facility]   
[facility]  
Work Phone:   
[phone]  
   
                                                    2022   
16:                              Systolic blood   
pressure                  112 mm[Hg]                Jason Bonilla  
Work Phone:   
[phone]                          [facility]   
[facility]   
[facility]  
Work Phone:   
[phone]  
   
                                                    2022   
16:          Body height         180.34 cm           Jason Bonilla  
Work Phone:   
[phone]                          [facility]   
[facility]   
[facility]  
Work Phone:   
[phone]  
   
                                                    2022   
16:                              Body mass index   
(BMI) [Ratio]             36.12 kg/m2               Jason Bonilla  
Work Phone:   
[phone]                          [facility]   
[facility]   
[facility]  
Work Phone:   
[phone]  
   
                                                    2022   
16:                              Body surface area   
Derived from formula      2.35 m2                   Jason Bonilla  
Work Phone:   
[phone]                          [facility]   
[facility]   
[facility]  
Work Phone:   
[phone]  
   
                                                    2022   
16:          Body temperature    95.5 [degF]         Jason Bonilla  
Work Phone:   
[phone]                          [facility]   
[facility]   
[facility]  
Work Phone:   
[phone]  
   
                                                    2022   
16:          Body weight         117.48 kg           Jason Bonilla  
Work Phone:   
[phone]                          [facility]   
[facility]   
[facility]  
Work Phone:   
[phone]  
   
                                                    2022   
16:                              Diastolic blood   
pressure                  64 mm[Hg]                 Jason Bonilla  
Work Phone:   
[phone]                          [facility]   
[facility]   
[facility]  
Work Phone:   
[phone]  
   
                                                    2022   
16:          Heart rate          71 /min             Jason Bonilla  
Work Phone:   
[phone]                          [facility]   
[facility]   
[facility]  
Work Phone:   
[phone]  
   
                                                    2022   
16:                              SaO2% (BldA) [Mass   
fraction]                 98 %                      Jason Bonilla  
Work Phone:   
[phone]                          [facility]   
[facility]  
Work Phone:   
[phone]  
   
                                                    2022   
16:                              Systolic blood   
pressure                  124 mm[Hg]                Jason Bonilla  
Work Phone:   
[phone]                          [facility]   
[facility]  
Work Phone:   
[phone]  
   
                                                    2021   
14:                              Diastolic blood   
pressure                  107 mm[Hg]                Mundo Cortez MD  
Work Phone:   
[phone]                          [facility]  
   
                                                    2021   
14:                              Systolic blood   
pressure                  169 mm[Hg]                Mundo Cortez MD  
Work Phone:   
[phone]                          [facility]  
   
                                                    2021   
14:          Body height         175.3 cm            Mundo Cortez MD  
Work Phone:   
[phone]                          [facility]  
   
                                                    2021   
14:                              Body mass index   
(BMI) [Ratio]             38.4 kg/m2                Mundo Cortez MD  
Work Phone:   
[phone]                          [facility]  
   
                                                    2021   
14:          Body weight         117.94 kg           Mundo Cortez MD  
Work Phone:   
[phone]                          [facility]  
   
                                                    2021   
14:          Heart rate          90 /min             Mundo Cortez MD  
Work Phone:   
[phone]                          [facility]  
   
                                                    2021   
14:                              SaO2% (BldA) [Mass   
fraction]                 98 %                      Mundo Cortez MD  
Work Phone:   
[phone]                          [facility]  
   
                                                    2021   
15:          Body height         180.34 cm           Jason Bonilla  
Work Phone:   
[phone]                          [facility]   
[facility]  
Work Phone:   
[phone]  
   
                                                    2021   
15:                              Body mass index   
(BMI) [Ratio]             34.6 kg/m2                Jason Bonilla  
Work Phone:   
[phone]                          [facility]   
[facility]  
Work Phone:   
[phone]  
   
                                                    2021   
15:                              Body surface area   
Derived from formula      2.31 m2                   Jason Bonilla  
Work Phone:   
[phone]                          -Medical   
[facility]  
Work Phone:   
[phone]  
   
                                                    2021   
15:          Body temperature    97.5 [degF]         Jason Bonilla  
Work Phone:   
[phone]                          [facility]   
[facility]  
Work Phone:   
[phone]  
   
                                                    2021   
15:          Body weight         112.52 kg           Jason Bonilla  
Work Phone:   
[phone]                          [facility]   
[facility]  
Work Phone:   
[phone]  
   
                                                    2021   
15:                              Diastolic blood   
pressure                  68 mm[Hg]                 Jason Bonilla  
Work Phone:   
[phone]                          [facility]  
Work Phone:   
[phone]  
   
                                                    2021   
15:          Heart rate          103 /min            Jason Bonilla  
Work Phone:   
[phone]                          -Medical   
[facility]  
Work Phone:   
[phone]  
   
                                                    2021   
15:                              SaO2% (BldA) [Mass   
fraction]                 98 %                      Jason Bonilla  
Work Phone:   
[phone]                          [facility]  
Work Phone:   
[phone]  
   
                                                    2021   
15:                              Systolic blood   
pressure                  110 mm[Hg]                Jason Bonilla  
Work Phone:   
[phone]                          -Medical   
[facility]  
Work Phone:   
[phone]  
   
                                                    2021   
15:                              BMI (Body Mass   
Index)              34.87 kg/m2         Jason Bonilla     [facility]   
Mike 3 DO  
Work Phone:   
[phone]  
   
                                                    2021   
15:      Body Temperature 97.1 [degF]     Jason Bonilla [facility]   
Mike 3 DO  
Work Phone:   
[phone]  
   
                                                    2021   
15:      Body weight     113.4 kg        Jason Bonilla [facility]   
Mike 3 DO  
Work Phone:   
[phone]   
15:      BP Diastolic    75 mm[Hg]       Jason Bonilla RE-Jkfqtpljxa-LI  
C   
[facility]   
Mike 3 DO  
Work Phone:   
[phone]   
15:      BP Systolic     127 mm[Hg]      Jason Bonilla [facility]   
Mike 3 DO  
Work Phone:   
[phone]   
15:                              BSA (Body Surface   
Area)               2.32 m2             Jason Bonilla     [facility]   
Mike 3 DO  
Work Phone:   
[phone]   
15:      Height          180.34 cm       Jason Bonilla EW-Dpqfsfoqzl[facility]   
Mike 3 DO  
Work Phone:   
[phone]   
15:      Pulse (Heart Rate) 100 /min        Jason Bonilla MP-Nephrology  
[facility]   
Mike 3 DO  
Work Phone:   
[phone]  
   
                                                    Comment on   
above:                                  Quality: Irregular   
   
                                                    2021   
09:                              BMI (Body Mass   
Index)                    30.48 kg/m2               [id number]   
[facility]   
PROCEDURE RM                            WC-Zmehfdf-Cqeyplc  
Work Phone:   
[phone]  
   
                                                    2021   
09:          Body weight         99.14 kg            [id number]   
[facility]   
PROCEDURE RM                            MV-Ljlhmkt-Jtnhwhj  
Work Phone:   
[phone]  
   
                                                    2021   
09:                              BSA (Body Surface   
Area)                     2.19 m2                   [id number]   
[facility]   
PROCEDURE RM                            OU-Bdfwrwu-Iuccwzs  
Work Phone:   
[phone]  
   
                                                    2021   
09:          Height              180.34 cm           [id number]   
[facility]   
PROCEDURE RM                            YJ-Xvkieub-Nwtydxr  
Work Phone:   
[phone]  
   
                                                    2021   
09:                              BMI (Body Mass   
Index)                    37.66 kg/m2               [id number]   
[facility]   
PROCEDURE RM                            GR-Cqxnojb-Nqqrkpy  
Work Phone:   
[phone]   
09:          Body weight         115.68 kg           [id number]   
[facility]   
PROCEDURE RM                            VT-Jyjbwyx-Gvuolkc  
Work Phone:   
[phone]  
   
                                                    2021   
09:          BP Diastolic        91 mm[Hg]           [id number]   
[facility]   
PROCEDURE RM                            EY-Ffsfuhz-Aikoznt  
Work Phone:   
[phone]  
   
                                                    2021   
09:          BP Systolic         154 mm[Hg]          [id number]   
[facility]   
PROCEDURE RM                            AM-Kclkxjc-Lxxseop  
Work Phone:   
[phone]   
09:                              BSA (Body Surface   
Area)                     2.29 m2                   [id number]   
[facility]   
PROCEDURE RM                            SU-Wgbjyyl-Iaomcgf  
Work Phone:   
[phone]   
09:          Height              175.26 cm           [id number]   
[facility]   
PROCEDURE RM                            [facility]  
Work Phone:   
[phone]  
   
                                                    2021   
09:          Pulse (Heart Rate)  91 /min             [id number]   
[facility]   
PROCEDURE RM                            MH-Uarpmju-Hojonrg  
Work Phone:   
[phone]  
   
                                                    2020   
15:                              Diastolic blood   
pressure            137 ml              Mundo Aguilarishman     [facility]  
   
                                                    2020   
15:                              Systolic blood   
pressure            80 ml               [facility]  
   
                                                    2020   
07:                              BMI (Body Mass   
Index)              36.9 kg/m2          Mundo Cortez     [facility]  
   
                                                    2020   
07:      Body weight     113.4 kg        Mundo Cortez [facility]  
   
                                                    2020   
07:      BP Diastolic    95 mm[Hg]       Mundo Cortez [facility]  
   
                                                    2020   
07:      BP Systolic     157 mm[Hg]      Mundo Cortez [facility]  
   
                                                    2020   
07:      Height          175.3 cm        Mundo Cortez [facility]  
   
                                                    2020   
07:      Pulse (Heart Rate) 93 /min         [facility]  
   
                                                    2020   
08:                              BMI (Body Mass   
Index)              36.92 kg/m2         [facility]  
   
                                                    2020   
08:      Body weight     113.4 kg        [facility]  
   
                                                    2020   
08:      BP Diastolic    89 mm[Hg]       [facility]  
   
                                                    2020   
08:      BP Systolic     132 mm[Hg]      [facility]  
   
                                                    2020   
08:      Height          175.3 cm        [facility]  
   
                                                    2020   
08:      Pulse (Heart Rate) 90 /min         [facility]  
   
                                                    2020   
08:      Pulse Oximetry  94 %            [facility]  
   
                                                    10-   
18:                              BMI (Body Mass   
Index)              39.63 kg/m2         Jason Zurdomala     -Medical   
[facility]  
Work Phone:   
[phone]  
   
                                                    10-   
18:      Body Temperature 97.7 [degF]     Jason Zurdomala -Medical   
[facility]  
Work Phone:   
[phone]  
   
                                                    10-   
18:      Body weight     121.73 kg       Jason Zurdomala [facility]  
Work Phone:   
[phone]  
   
                                                    10-   
18:      BP Diastolic    80 mm[Hg]       Jason Zurdomala -Medical   
[facility]  
Work Phone:   
[phone]  
   
                                                    10-   
18:      BP Systolic     120 mm[Hg]      Jason Bonilla -Medical   
[facility]  
Work Phone:   
[phone]  
   
                                                    10-   
18:                              BSA (Body Surface   
Area)               2.34 m2             Jason Bonilla     -Medical   
[facility]  
Work Phone:   
[phone]  
   
                                                    10-   
18:      Height          175.26 cm       [facility]  
Work Phone:   
[phone]  
   
                                                    10-   
17:                              BMI (Body Mass   
Index)              39.28 kg/m2         Jason Bonilla     -Medical   
[facility]  
Work Phone:   
[phone]  
   
                                                    10-   
17:      Body Temperature 96.9 [degF]     Jason Bonilla -Medical   
[facility]  
Work Phone:   
[phone]  
   
                                                    10-   
17:      Body weight     120.67 kg       [facility]   
[facility]  
Work Phone:   
[phone]  
   
                                                    10-   
17:      BP Diastolic    68 mm[Hg]       Jason Bonilla -Medical   
[facility]  
Work Phone:   
[phone]  
   
                                                    10-   
17:      BP Systolic     118 mm[Hg]      [facility]   
[facility]  
Work Phone:   
[phone]  
   
                                                    10-   
17:                              BSA (Body Surface   
Area)               2.33 m2             Jason Bonilla     [facility]   
[facility]  
Work Phone:   
[phone]  
   
                                                    10-   
17:      Height          175.26 cm       Jason Bon[facility]   
[facility]  
Work Phone:   
[phone]  
   
                                                    10-   
17:      Pulse (Heart Rate) 87 /min         Jason Bon[facility]   
[facility]  
Work Phone:   
[phone]  
   
                                                    10-   
17:      Pulse Oximetry  97 %            Jason Bon[facility]   
[facility]  
Work Phone:   
[phone]  
   
                                                    2019   
13:                              BMI (Body Mass   
Index)              39.52 kg/m2         [facility]  
   
                                                    2019   
13:      BP Diastolic    93 mm[Hg]       Mundo [facility]  
   
                                                    2019   
13:      BP Systolic     147 mm[Hg]      Mundo [facility]ilar[facility]man [facility]  
   
                                                    2019   
13:      Height          175.3 cm        Mundo Aguilarishman [facility]  
   
                                                    2019   
13:      Pulse (Heart Rate) 69 /min         Mundo Aguilarishman [facility]  
   
                                                    2019   
13:      Pulse Oximetry  93 %            Mundo Aguilarishman [facility]  
   
                                                    2019   
13:      Weight          121.38 kg       Mundo Cortez [facility]  
  
  
  
Encounters  
  
  
                          Encounter Date Encounter Type Care Provider Facility  
   
                                                    Start: 2024  
End: 2024                         Office outpatient visit   
25 minutes                              Jason Bonilla MD  
Work Phone:   
[phone]                          [facility]  
   
                                        Comment on above:   Controlled type 2 di  
abetes mellitus without complication,   
without   
long-term current use of insulin (Multi) (Primary Dx);  
Hypertension, essential;  
Iron deficiency anemia due to chronic blood loss   
   
                                                    Start: 2024  
End: 2024     ambulatory          [facility]  
   
Ambulatory  
   
                                                    Start: 2024  
End: 2024     ambulatory          [facility]  
   
                                                    Start: 10-  
End: 10-     ambulatory          [facility]  
   
                                                    Start: 10-  
End: 10-                         Office outpatient visit   
25 minutes                              Jason Bonilla MD  
Work Phone:   
[phone]                          [facility]  
   
                                        Comment on above:   Controlled type 2 di  
abetes mellitus without complication,   
without   
long-term current use of insulin (Multi) (Primary Dx);  
ED (erectile dysfunction) of organic origin;  
Hypertension, essential;  
Iron deficiency anemia due to chronic blood loss   
   
                                                    Start: 10-  
End: 10-     ambulatory          [facility]  
   
Ambulatory  
   
                                                    Start: 10-  
End: 10-     ambulatory          MUNDO CORTEZ  [facility]  
   
                                                    Start: 10-  
End: 10-     ambulatory          [facility]  
   
                                                    Start: 2024  
End: 2024     Orders Only         Katie Tsang RN [facility]  
   
                                        Comment on above:   Chronic atrial fibri  
llation (HCC) (Primary Dx);  
ANAYA (dyspnea on exertion);  
Coronary artery disease involving native coronary artery of native   
heart without angina pectoris;  
Permanent atrial fibrillation (HCC);  
Shortness of breath   
   
                                                    Start: 2024  
End: 2024           Clinical Support          Mundo Cortez MD  
Work Phone:   
[phone]                          [facility]  
   
                                        Comment on above:   Chronic atrial fibri  
llation (HCC);  
ANAYA (dyspnea on exertion);  
Coronary artery disease involving native coronary artery of native   
heart without angina pectoris   
   
                                                    Start: 2024  
End: 2024           ambulatory                JASON LUJAN   
[facility]  
   
                          Start: 2024 ambulatory   [facility]  
   
                                                    Start: 2024  
End: 2024     ambulatory          [facility]  
   
Ambulatory  
   
                                                    Start: 2024  
End: 2024     ambulatory          MUNDO CORTEZ  [facility]  
   
                                                    Start: 2024  
End: 2024     ambulatory          [facility]  
   
Ambulatory  
   
                                                    Start: 2024  
End: 2024     ambulatory          [facility]  
   
Ambulatory  
   
                                                    Start: 2024  
End: 2024     ambulatory          DONNA SANDOVAL DO     Facility:[id number]  
   
                                                    Start: 2024  
End: 2024     ambulatory          [facility]  
   
                                                    Start: 2024  
End: 2024           Clinical Support          Rj Raines MD  
Work Phone:   
[phone]                          [facility]  
   
                                        Comment on above:   Lumbar degenerative   
disc disease (Primary Dx)   
   
                                                    Start: 2024  
End: 2024     ambulatory          [facility]  
ry  
   
                                                    Start: 2024  
End: 2024     ambulatory          [facility]  
   
Ambulatory  
   
                                                    Start: 2024  
End: 2024     ambulatory          [facility]  
   
                                                    Start: 2024  
End: 2024     Orders Only         Katie Tsang RN [facility]  
   
                                        Comment on above:   Benign essential hyp  
ertension (Primary Dx);  
Permanent atrial fibrillation (HCC);  
Coronary artery disease involving native coronary artery of native   
heart without angina pectoris   
   
                                                    Start: 2024  
End: 2024                         Phys/qhp telephone   
evaluation 11-20 min                    Mundo Cortez MD  
Work Phone:   
[phone]                          [facility]  
   
                                        Comment on above:   Coronary artery dise  
ase involving native coronary artery of   
native   
heart without angina pectoris (Primary Dx);  
Typical atrial flutter (HCC);  
Chronic atrial fibrillation (HCC)   
   
                                                    Start: 2023  
End: 2023                         Office outpatient visit   
15 minutes                              Kenton Hinojosa DPM  
Work Phone:   
[phone]                          [facility]  
   
                                        Comment on above:   Plantar keratosis, a  
cquired (Primary Dx)   
   
                                                    Start: 2023  
End: 2023           ambulatory                JASON BONILLA                                 [facility]  
   
                                Start: 2023 Refill          Mundo centeno MD  
Work Phone:   
[phone]                          [facility]  
   
                                        Comment on above:   Medication Refill   
   
                                                    Start: 2023  
End: 2023                         Office outpatient visit   
15 minutes                              Jason Bonilla MD  
Work Phone:   
[phone]                           [facility]  
   
                                        Comment on above:   Change in voice (Emily  
simeon Dx)   
   
                                                    Start: 2023  
End: 2023           Clinical Support          Rj Raines MD  
Work Phone:   
[phone]                          [facility]  
   
                                        Comment on above:   Lumbar degenerative   
disc disease (Primary Dx);  
Spinal stenosis of lumbar region with neurogenic claudication   
   
                                                    Start: 2023  
End: 2023                         Office outpatient visit   
15 minutes                              Rj Raines MD  
Work Phone:   
[phone]                          [facility]  
   
                                        Comment on above:   Left-sided back pain  
, unspecified back location, unspecified   
chronicity (Primary Dx);  
Lumbar degenerative disc disease;  
Spinal stenosis of lumbar region with neurogenic claudication   
   
                                        Start: 2023   Admission to [facility]                          Rj Raines MD  
Work Phone:   
[phone]                          [facility]  
   
                                        Comment on above:   Spinal stenosis of l  
umbar region with neurogenic claudication   
(Primary Dx);  
Left-sided back pain, unspecified back location, unspecified   
chronicity;  
Lumbar degenerative disc disease   
   
                                                    Start: 2023  
End: 2023                         Patient encounter   
procedure                               Rj Raines MD  
Work Phone:   
[phone]                          [facility]   
[facility]   
[facility]  
   
                                        Comment on above:   Left-sided back pain  
, unspecified back location, unspecified   
chronicity   
   
                                                    Start: 2023  
End: 2023                         Office outpatient visit   
25 minutes                              Jason Bonilla MD  
Work Phone:   
[phone]                           [facility]  
   
                                        Comment on above:   Controlled type 2 di  
abetes mellitus without complication,   
without   
long-term current use of insulin (CMS/HCC) (Primary Dx);  
Hypertension, essential;  
Paroxysmal atrial fibrillation (CMS/HCC);  
Mixed hyperlipidemia;  
ED (erectile dysfunction) of organic origin   
   
                          Start: 2023 Refill       April [facility]  
   
                                        Comment on above:   Medication Refill   
   
                                                    Start: 2023  
End: 2023           ambulatory                PROVIDER NOT IN   
SYSTEM                                  [facility]  
   
                                                    Start: 2023  
End: 2023                         Office outpatient visit   
15 minutes                              Rj Raines MD  
Work Phone:   
[phone]                          [facility]  
   
                                        Comment on above:   Lumbar degenerative   
disc disease (Primary Dx)   
   
                                Start: 2023 Orders Only     Rj Raines MD  
Work Phone:   
[phone]                          [facility]  
   
                                        Comment on above:   Lumbar degenerative   
disc disease (Primary Dx);  
Left-sided back pain, unspecified back location, unspecified   
chronicity   
   
                                                    Start: 2023  
End: 2023                         Office outpatient visit   
15 minutes                              Kenton MAHMOODM  
Work Phone:   
[phone]                          [facility]  
   
                                        Comment on above:   Left-sided back pain  
, unspecified back location, unspecified   
chronicity (Primary Dx)   
   
                                                    Start: 2023  
End: 2023                         Office outpatient new 30   
minutes                                 Kenton Hinojosa DPM  
Work Phone:   
[phone]                          [facility]  
   
                                        Comment on above:   Left foot pain (Prim  
jack Dx);  
Left-sided back pain, unspecified back location, unspecified   
chronicity   
   
                                Start: 2023 Refill          Mundo centeno MD  
Work Phone:   
[phone]                          [facility]  
   
                                        Comment on above:   Medication Refill   
   
                                Start: 2023 Refill          Mundo centeno MD  
Work Phone:   
[phone]                          [facility]  
   
                                        Comment on above:   Medication Refill   
   
                                        Start: 2023   Office outpatient vi  
sit   
25 minutes                              Jason Bonilla  
Work Phone:   
[phone]                          [facility]   
[facility]  
Work Phone:   
[phone]  
   
                          Start: 2023 ambulatory   JASON Peck  
ity:[id number]  
   
                                Start: 2023 Chart Update    Jason MCCULLOUGH Zurdo medeiros  
Work Phone:   
[phone]                          [facility]   
[facility]  
Work Phone:   
[phone]  
   
                                Start: 2023 Refill          Mundo centeno MD  
Work Phone:   
[phone]                          [facility]  
   
                                        Comment on above:   Medication Refill   
   
                                                    Start: 2023  
End: 2023                         Office outpatient visit   
25 minutes                              Mundo Cortez MD  
Work Phone:   
[phone]                          [facility]  
   
                                        Comment on above:   Permanent atrial fib  
rillation (HCC);  
Coronary artery disease involving native coronary artery of native   
heart without angina pectoris;  
ANAYA (dyspnea on exertion)   
   
                                                    Start: 2023  
End: 2023           Orders Only               Mundo Cortez MD  
Work Phone:   
[phone]                          [facility]  
   
                                        Comment on above:   Benign essential hyp  
ertension (Primary Dx)   
   
                                Start: 2022 Refill          Mundo centeno MD  
Work Phone:   
[phone]                          [facility]  
   
                                        Comment on above:   Medication Refill   
   
                                Start: 2022 Refill          Mundo centeno MD  
Work Phone:   
[phone]                          [facility]  
   
                                        Comment on above:   Medication Refill   
   
                                Start: 2022 AUDIT           Jason medeiros  
Work Phone:   
[phone]                          [facility]   
[facility]  
Work Phone:   
[phone]  
   
                                                    Start: 2022  
End: 2022     ambulatory          Joel Adame       Facility:[id number]  
   
                                Start: 10- Refill          Mundo centeno MD  
Work Phone:   
[phone]                          [facility]  
   
                                        Comment on above:   Medication Refill   
   
                                                    Start: 2022  
End: 2022                         Patient encounter   
procedure                               Mundo Cortez MD  
Work Phone:   
[phone]                          [facility]  
   
                                        Comment on above:   ANAYA (dyspnea on exer  
tion)   
   
                                                    Start: 2022  
End: 2022           Physical examination      Robin Ríos MD  
Work Phone:   
[phone]                          [facility]  
   
                                        Start: 2022   Adv care pln/ no alt  
   
dcsn mkr docd or refusal                Jason T Furness  
Work Phone:   
[phone]                          [facility]   
[facility]  
Work Phone:   
[phone]  
   
                          Start: 2022 ambulatory   JASON T FURNESS Facil  
ity:[id number]  
   
                                Start: 2022 Chart Update    Jason T Furn  
ess  
Work Phone:   
[phone]                          [facility]   
[facility]  
Work Phone:   
[phone]  
   
                          Start: 2022 Telephone encounter Faviola Conner RN  
 [facility]  
   
                                        Comment on above:   Patient Concern   
   
                                Start: 04- Refill          Mundo centeno MD  
Work Phone:   
[phone]                          [facility]  
   
                                        Comment on above:   Medication Refill   
   
                                Start: 2022 Refill          Mundo centeno MD  
Work Phone:   
[phone]                          [facility]  
   
                                        Comment on above:   Medication Refill   
   
                                        Start: 2022   Office outpatient vi  
sit   
25 minutes                              Jason T Furness  
Work Phone:   
[phone]                          [facility]   
[facility]  
Work Phone:   
[phone]  
   
                                Start: 2021 Refill          Mundo centeno MD  
Work Phone:   
[phone]                          [facility]  
   
                                        Comment on above:   Medication Refill   
   
                                                    Start: 2021  
End: 2021                         Office outpatient visit   
25 minutes                              Mundo Cortez MD  
Work Phone:   
[phone]                          [facility]  
   
                                        Comment on above:   Typical atrial flutt  
er (HCC);  
Coronary artery disease involving native coronary artery of native   
heart without angina pectoris;  
ANAYA (dyspnea on exertion);  
Permanent atrial fibrillation (HCC)   
   
                          Start: 2021 Orders Only  Milagro Moseley RN [facility]   
[facility]  
   
                                        Comment on above:   Typical atrial flutt  
er (HCC) (Primary Dx)   
   
                                Start: 10- Refill          Mundo centeno MD  
Work Phone:   
[phone]                          [facility]  
   
                                        Comment on above:   Medication Refill (a  
torvastatin (LIPITOR) 40 MG tablet)   
   
                                Start: 10- Refill          Maira santoro MA                                      [facility]  
   
                                        Comment on above:   Medication Refill   
   
                                        Start: 2021   Office outpatient vi  
sit   
25 minutes                              Jason T Furness  
Work Phone:   
[phone]                          [facility]   
[facility]  
Work Phone:   
[phone]  
   
                                                    Start: 2021  
End: 2021           Orders Only               Sherlyn Lopezerick Keenan  
Work Phone:   
[phone]                          [facility]   
[facility]   
[facility]  
   
                                        Start: 2021   Patient encounter   
procedure                               [id number] [facility]   
UROLOGY PROCEDURE RM                    [facility]  
Work Phone:   
[phone]  
   
                                        Start: 2021   Office outpatient vi  
sit   
25 minutes                              Jason T Furness  
Work Phone:   
[phone]                          [facility]  
Work Phone:   
[phone]  
   
                                        Start: 2021   Patient encounter   
procedure                               [id number] [facility]   
UROLOGY PROCEDURE RM                    [facility]  
Work Phone:   
[phone]  
   
                                        Start: 2021   Patient encounter   
procedure                               [id number] [facility]   
UROLOGY PROCEDURE RM                    [facility]  
Work Phone:   
[phone]  
   
                                                    Start: 2020  
End: 2020                         Subsequent hospital   
visit by physician                      Mundo Cortez  
Work Phone:   
[phone]                          [facility]  
   
                                        Comment on above:   Arrived   
   
                                                            Chronic atrial fibri  
llation   
   
                                                    Start: 2020  
End: 2020                         Office outpatient visit   
25 minutes                              Mundo Cortez  
Work Phone:   
[phone]                          [facility]   
Office  
   
                                        Comment on above:   Coronary artery dise  
ase involving native coronary artery of   
native   
heart without angina pectoris;  
COVID-19;  
Chronic atrial fibrillation;  
Erectile dysfunction due to arterial insufficiency   
   
                                        Start: 10-   Patient encounter   
procedure                               [id number] [facility]-day   
UROLOGY PROCEDURE RM                    [facility]  
Work Phone:   
[phone]  
   
                                        Start: 10-   Patient encounter   
procedure                 Jason Rennermala           [facility]  
Work Phone:   
[phone]  
   
                                        Start: 10-   Patient encounter   
procedure                 Jason Bonilla           -[facility]  
Work Phone:   
[phone]  
   
                                                    Start: 2019  
End: 2019                         Patient encounter   
procedure                 Rl Rosen               [facility]  
   
                                                    Start: 2019  
End: 2019                         Patient encounter   
procedure                 Rl Rosen               Facility:[facility]  
   
                                                    Start: 2019  
End: 2019                         Office outpatient visit   
15 minutes                              Mundo Cortez  
Work Phone:   
[phone]                          [facility]   
Office  
   
                                        Comment on above:   Coronary artery dise  
ase involving native coronary artery of   
native   
heart without angina pectoris; Typical atrial flutter (HCC)   
   
                                                    Start: 05-  
End: 05-                         Patient encounter   
procedure                 Soledad Merlos           Facility:[facility]  
   
                                                    Start: 2018  
End: 2018                         Patient encounter   
procedure                 Soledad Merlos           Facility:[facility]  
   
                                                    Start: 2018  
End: 2018                         Patient encounter   
procedure                 Soledad Merlos           Facility:[facility]  
   
                                                            Patient encounter   
procedure                               Jason MCCULLOUGH Zurdomala  
Work Phone:   
[phone]                          [facility]  
Work Phone:   
[phone]  
  
  
  
Procedures  
  
  
                          Date         Procedure    Procedure Detail Performing   
Clinician  
   
                                        Start: 2024   Ecg routine ecg w/le  
ast 12   
lds w/i&r                                           Mundo Cortez MD  
Work Phone:   
[phone]  
   
                          Start: 2024 Inject spine lumbar/sacral            
    Rj Raines MD  
Work Phone:   
[phone]  
   
                          Start: 2024 FOLLOW UP IN FAMILY MEDICINE          
      JASON BONILLA  
   
                                        Start: 2024   CBC panel - Blood by  
   
Automated count                                     JASON BONILLA  
   
                                        Start: 2024   Comprehensive metabo  
lic 2000   
panel - Serum or Plasma                             JASON FURNESS  
   
                                        Start: 2024   Hemoglobin   
A1c/Hemoglobin.total in   
Blood                                               JASON BONILLA  
   
                          Start: 2023 Inject spine lumbar/sacral            
    Rj Raines MD  
Work Phone:   
[phone]  
   
                                        Start: 2023   Lipid 1996 panel - S  
giovany or   
Plasma                                              Jason Bonilla MD  
Work Phone:   
[phone]  
   
                                        Start: 2023   Radex foot complete   
minimum   
3 views                                             Kenton Hinojosa DPM  
Work Phone:   
[phone]  
   
                                                    Start: 2022  
End: 2022     Colonoscopy                             Jason T Irene  
Work Phone:   
[phone]  
   
                          Start: 2022 Colonoscopy               Jason T   
Irene  
Work Phone:   
[phone]  
   
                                        Comment on above:   wendi, 3 years;   
   
                                        Start: 2022   Ecg routine ecg w/le  
ast 12   
lds w/i&r                                           Mundo Cortez MD  
Work Phone:   
[phone]  
   
                                        Start: 2021   Ecg routine ecg w/le  
ast 12   
lds w/i&r                                           Mundo Cortez MD  
Work Phone:   
[phone]  
   
                                        Start: 2021   Basic metabolic 1998  
 panel -   
Serum or Plasma                                     Jason Bonilla  
   
                                        Start: 2021   CBC W Auto Different  
ial   
panel - Blood                                       Jason Bonilla  
   
                                        Start: 2021   Ct abdomen & pelvis   
w/o   
contrst 1/> body re                                 Jason Bonilla  
   
                                        Start: 2021   Assay of prostate sp  
ecific   
antigen total                                       [id number] [facility]   
UROLOGY PROCEDURE RM  
   
                                        Start: 2021   Assay of urea nitrog  
en   
quantitative                                        [id number] [facility]   
UROLOGY PROCEDURE RM  
   
                          Start: 2021 Creatinine blood              [id number]  
[id number] [facility]-day   
UROLOGY PROCEDURE RM  
   
                                        Start: 2021   CBC W Auto Different  
ial   
panel - Blood                                       [id number] [facility]   
UROLOGY PROCEDURE RM  
   
                                        Start: 2021   Comprehensive metabo  
lic 2000   
panel                                               [id number] [facility]   
UROLOGY PROCEDURE RM  
   
                          Start: 2021 Hemoglobin glycosylated a1c           
     [id number] [facility]   
UROLOGY PROCEDURE RM  
   
                                        Start: 2020   Radionuclide myocard  
ial   
perfusion study                                     Mundo Cortez  
Work Phone:   
[phone]  
   
                          Start: 2020 Contrast echocardiography             
   Mundo Cortez  
Work Phone:   
[phone]  
   
                          Start: 2020 12 lead ECG               Mundo jones  
Work Phone:   
[phone]  
   
                                        Start: 10-   Comprehensive metabo  
lic 2000   
panel                                               Jason Furnmala  
   
                          Start: 10- Hemoglobin glycosylated a1c           
     Jason Furness  
   
                                        Start: 10-   Assay of prostate sp  
ecific   
antigen total                                       Jason Furness  
   
                                        Start: 10-   Assay of thyroid sti  
mulating   
hormone tsh                                         Jason Furness  
   
                                        Start: 10-   Basic metabolic 1998  
 panel -   
Serum or Plasma                                     Jason Furnmala  
   
                          Start: 10- Hemoglobin glycosylated a1c           
     Jason Furness  
   
                          Start: 10- Lipid panel               Jason Fu  
rness  
   
                          Start: 2019 Colonoscopy               Robin Fagan MD  
Work Phone:   
[phone]  
   
                          Start: 2019 Colonoscopy               Jason T   
Irene  
Work Phone:   
[phone]  
   
                                        Comment on above:   wendi, 3 years;   
   
                                       Appendectomy              [id number] KIERA  
[facility]AN   
UROLOGY PROCEDURE RM  
   
                                       Operative procedure on foot                
[id number] [facility]   
UROLOGY PROCEDURE RM  
   
                                       Tonsillectomy              [id number] ASIA  
ARITAN   
UROLOGY PROCEDURE RM  
  
  
  
Plan of Treatment  
  
  
                          Date         Care Activity Detail       Author  
   
                                        Start: 2032   Screening for malign  
ant   
neoplasm of colon                                   [facility]  
   
                                        Start: 2029   Screening for malign  
ant   
neoplasm of colon                                   [facility]  
   
                                        Start: 2025   Hemoglobin A1c   
measurement               Diabetes: Hemoglobin A1C  [facility]  
   
                                        Start: 2025   Hemoglobin A1c   
measurement               A1C                       [facility]  
   
                                                    Start: 01-  
End: 01-                         Patient encounter   
procedure                               01/10/2025 1:45 PM EST   
Office Visit [facility]   
[facility]   
Physicians [address]   
[place]   
[address] [phone]   
Mundo Cortez MD   
[address]   
[address] [place] [address]   
[phone] (Work)   
[phone] (Fax)                      [facility]   
[facility]  
   
                                                    Start: 2025  
End: 2025                         Patient encounter   
procedure                               2025 10:20 AM EST   
Office Visit [facility] [address]   
[address] [phone]   
Jason Bonilla MD   
[address] [address]   
[phone] (Work)   
[phone] (Fax)                      [facility]  
   
                                                    Start: 2024  
End: 10-                         Basic metabolic 2000   
panel - Serum or Plasma                 Basic Metabolic Panel   
Lab Routine Controlled   
type 2 diabetes mellitus   
without complication,   
without long-term   
current use of insulin   
(Multi) Expected:   
2024   
(Approximate), Expires:   
10/07/2025                              [facility]  
Work Phone:   
[phone]  
   
                                        Comment on above:   Expected: 2024  
 (Approximate), Expires: 10/07/2025   
   
                                                    Start: 2024  
End: 10-                         CBC panel - Blood by   
Automated count                         CBC Lab Routine Iron   
deficiency anemia due to   
chronic blood loss   
Expected: 2024   
(Approximate), Expires:   
10/07/2025                              [facility]  
Work Phone:   
[phone]  
   
                                        Comment on above:   Expected: 2024  
 (Approximate), Expires: 10/07/2025   
   
                                                    Start: 2024  
End: 10-                         Ferritin [Mass/volume]   
in Serum or Plasma                      Ferritin Lab Routine   
Iron deficiency anemia   
due to chronic blood   
loss Expected:   
2024   
(Approximate), Expires:   
10/07/2025                              [facility]  
Work Phone:   
[phone]  
   
                                        Comment on above:   Expected: 2024  
 (Approximate), Expires: 10/07/2025   
   
                                                    Start: 2024  
End: 10-                         Hemoglobin   
A1c/Hemoglobin.total in   
Blood                                   Hemoglobin A1C Lab   
Routine Controlled type   
2 diabetes mellitus   
without complication,   
without long-term   
current use of insulin   
(Multi) Expected:   
2024   
(Approximate), Expires:   
10/07/2025                              [facility]  
Work Phone:   
[phone]  
   
                                        Comment on above:   Expected: 2024  
 (Approximate), Expires: 10/07/2025   
   
                                                    Start: 2024  
End: 10-                         Iron and Iron binding   
capacity panel - Serum   
or Plasma                               Iron and TIBC Lab   
Routine Iron deficiency   
anemia due to chronic   
blood loss Expected:   
2024   
(Approximate), Expires:   
10/07/2025                              [facility]  
Work Phone:   
[phone]  
   
                                        Comment on above:   Expected: 2024  
 (Approximate), Expires: 10/07/2025   
   
                                                    Start: 2024  
End: 2024                         Patient encounter   
procedure                               2024 9:40 AM EST   
Office Visit [place] [address]   
[address] [phone]   
Jason Bonilla MD   
[address] [address]   
[phone] (Work)   
[phone] (Fax)                      [facility]  
   
                                        Start: 10-   Hemoglobin A1c   
measurement               Diabetes: Hemoglobin A1C  [facility]  
   
                                                    Start: 10-  
End: 10-                         Patient encounter   
procedure                                           [facility]   
[facility]  
   
                                        Start: 2024   COVID-19 Vaccine (3   
-   
2023-24 season)                         COVID-19 Vaccine (3 -   
2023-24 season)                         [facility]  
   
                                        Start: 2024   COVID-19 Vaccine (3   
-   
2024-25 season)                         COVID-19 Vaccine (3 -   
2024-25 season)                         [facility]  
   
                                        Start: 2024   COVID-19 Vaccine ( season)                         COVID-19 Vaccine ( season)                         [facility]  
   
                          Start: 2024 Influenza vaccination Influenza Vacc  
ine (#1) [facility]  
   
                          Start: 2024 Lipid panel  Lipid Panel  [facility]  
   
                                        Start: 2024   Hemoglobin A1c   
measurement               A1C                       [facility]  
   
                                                    Start: 2024  
End: 2024                         Patient encounter   
procedure                               2024 3:30 PM EDT   
Office Visit [facility]   
Physician Group Podiatry   
[address] [place] [address]   
[phone] Kenton Hinojosa Jr., DPM [address]   
[place] [address] [phone]   
(Work) [phone]   
(Fax)                                   [facility]   
Physician Group   
Podiatry  
   
                                                    Start: 2024  
End: 2024                         Patient encounter   
procedure                               2024 3:20 PM EST   
Office Visit [facility]   
 [place] [address] [phone] [place]    
[place] [address] [phone]   
(Work) [phone]   
(Fax)                                   [facility]  
   
                                        Start: 2024   Administration of he  
rpes   
zoster vaccine            Zoster Vaccines (2 of 2)  [facility]  
   
                                        Start: 2024   Hemoglobin A1c   
measurement               A1C                       [facility]  
   
                                                    Start: 2024  
End: 2024                         Patient encounter   
procedure                               2024 11:30 AM EST   
Office Visit [facility]   
Heart [facility] Vascular   
Physicians [address]   
[facility] [address]   
[place]   
[address] [phone]   
Mundo Cortez MD   
[address]   
[address] [place] [address]   
[phone] (Work)   
[phone] (Fax)                      [facility]   
[facility]  
   
                                        Start: 10-   Hemoglobin A1c   
measurement               Diabetes: Hemoglobin A1C  [facility]  
   
                                                    Start: 2023  
End: 2023                         Patient encounter   
procedure                               2023 4:00 PM EDT   
Office Visit [facility]   
Orthopedic and Sports   
Medicine [address] [place] [address] Medical [place]   
[place]   
[address] [phone]   
Rj Raines MD   
[address] [place] [address]   
[phone] (Work)   
[phone] (Fax)                      [facility]   
Orthopedic and   
Sports Medicine  
   
                                                    Start: 2023  
End: 2023           Clinical Support          2023 9:30 AM EDT   
Clinical Support   
[facility]   
[facility] [address] [place] [address] Medical   
[place] [address] [phone] Rj Raines MD [address] [place] [address] [phone]   
(Work) [phone]   
(Fax)                                   [facility]   
Orthopedic and   
Sports Medicine  
   
                                        Start: 2023   COVID-19 Vaccine ( season)                         COVID-19 Vaccine ( season)                         [facility]  
   
                          Start: 2023 Influenza vaccination              [facility]  
   
                                                    Start: 2023  
End: 2023                         Patient encounter   
procedure                               2023 2:45 PM EDT   
Office Visit [facility]   
Orthopedic and Sports   
Medicine [address] [facility] [place]   
[address] [phone]   
Rj Raines MD   
[address] [place] [address]   
[phone] (Work)   
[phone] (Fax)                      [facility]   
Orthopedic and   
Sports Medicine  
   
                                        Start: 2023   EPV, Provider:   
Jason Bonilla, Status:   
Pen, Time: 3:40 PM                      EPV, Provider:   
aJson Bonilla, Status:   
Pen, Time: 3:40 PM                      [facility]  
Work Phone:   
[phone]  
   
                                                    Start: 2023  
End: 2024                         CBC panel - Blood by   
Automated count                         CBC Lab Routine   
Controlled type 2   
diabetes mellitus   
without complication,   
without long-term   
current use of insulin   
(CMS/HCC) Hypertension,   
essential Expected:   
2023   
(Approximate), Expires:   
2024                              [facility]  
Work Phone:   
[phone]  
   
                                        Comment on above:   Expected: 2023  
 (Approximate), Expires: 2024   
   
                                                    Start: 2023  
End: 2024                         Comprehensive metabolic   
2000 panel - Serum or   
Plasma                                  Comprehensive Metabolic   
Panel Lab Routine   
Controlled type 2   
diabetes mellitus   
without complication,   
without long-term   
current use of insulin   
(CMS/HCC) Hypertension,   
essential Expected:   
2023   
(Approximate), Expires:   
2024                              [facility]  
Work Phone:   
[phone]  
   
                                        Comment on above:   Expected: 2023  
 (Approximate), Expires: 2024   
   
                                                    Start: 2023  
End: 2024                         Hemoglobin   
A1c/Hemoglobin.total in   
Blood                                   Hemoglobin A1C Lab   
Routine Controlled type   
2 diabetes mellitus   
without complication,   
without long-term   
current use of insulin   
(CMS/[facility]) Expected:   
2023   
(Approximate), Expires:   
2024                              [facility]  
Work Phone:   
[phone]  
   
                                        Comment on above:   Expected: 2023  
 (Approximate), Expires: 2024   
   
                                        Start: 2023   COVID-19 Vaccine (3   
-   
Booster for Maico   
series)                                 COVID-19 Vaccine (3 -   
Booster for Maico   
series)                                 [facility]  
   
                                        Start: 2023   EPV, Provider:   
Jason Bonilla, Status:   
Pen, Time: 4:00 PM                      EPV, Provider:   
Jason Bonilla, Status:   
Pen, Time: 4:00 PM                      [facility]  
Work Phone:   
[phone]  
   
                                        Start: 2023   EPV, Provider:   
Jason Bonilla, Status:   
Pen, Time: 4:00 PM                      EPV, Provider:   
Jason Bonilla, Status:   
Pen, Time: 4:00 PM                      [facility]  
Work Phone:   
[phone]  
   
                          Start: 2022 Influenza vaccination              [facility]  
[facility]  
   
                                                    Start: 2022  
End: 2022                         Patient encounter   
procedure                                           [facility]   
[facility]  
   
                                        Start: 2022   EPV, Provider:   
Jason Bonilla, Status:   
Pen, Time: 4:00 PM                      EPV, Provider:   
Jason Bonilla, Status:   
Pen, Time: 4:00 PM                      [facility]  
Work Phone:   
[phone]  
   
                                                    Start: 2022  
End: 2023                         Basic metabolic 2000   
panel - Serum or Plasma                 Basic metabolic panel   
Lab Routine ANAYA (dyspnea   
on exertion) Expected:   
2022, Expires:   
2023                              [facility]  
   
                                        Comment on above:   Expected: 2022  
, Expires: 2023   
   
                                        Start: 2022   COVID-19 Vaccine (3   
-   
Booster for [place]   
series)                                 COVID-19 Vaccine (3 -   
Booster for [place]   
series)                                 [facility]  
   
                                        Start: 2022   EPV, Provider:   
Jason Bonilla, Status:   
Pen, Time: 4:00 PM                      EPV, Provider:   
Jason Bonilla, Status:   
Pen, Time: 4:00 PM                      [facility]   
[facility]  
Work Phone:   
[phone]  
   
                                        Start: 2021   COVID-19 Vaccine (3   
-   
Booster for [place]   
series)                                 COVID-19 Vaccine (3 -   
Booster for [place]   
series)                                 [facility]  
   
                                                    Start: 2021  
End: 2021                         Patient encounter   
procedure                               2021 Office Visit   
Cardiology Mundo Cortez MD [id number]   
[place] [address]   
[phone] (Work)   
[phone] (Fax)                      [facility]   
[facility]  
   
                                Start: 2021 Influenza vaccination Sequenti  
al Influenza   
Vaccine (#1)                            [facility]  
   
                                        Start: 2021   Hemoglobin A1c   
measurement               A1C                       [facility]  
   
                                                    Start: 2020  
End: 2020           Appointment               2020 Appointment   
Cardiology Mundo Cortez MD [address]   
[place] [address]   
[phone] (Fax)                      [facility]   
[facility]  
   
                                        Start: 2020   Influenza vaccinatio  
n   
given                                   Sequential Influenza   
Vaccine (#1)                            [facility]  
   
                          Start: 2019 Fall risk assessment Falls Risk Asse  
ssment [facility]  
   
                                Start: 2019 Pneumococcal vaccination Pneum  
ococcal Vaccine Age   
65+ (1 of 2 - PCV13)                    [facility]  
   
                                        Start: 2019   Pneumococcal Vaccine  
:   
Age 65+ (1 of 1 -   
PPSV23)                                 Pneumococcal Vaccine:   
Age 65+ (1 of 1 -   
PPSV23)                                 [facility]  
   
                                        Start: 2018   Influenza vaccinatio  
n   
given                                   SEQUENTIAL INFLUENZA   
VACCINE (#1)                            [facility]  
   
                                        Start: 2014   RSV High Risk: (Elde  
rly   
(60+) or Pregnant   
Population) (1 - Risk   
60-74 years 1-dose   
series)                                 RSV High Risk: (Elderly   
(60+) or Pregnant   
Population) (1 - Risk   
60-74 years 1-dose   
series)                                 [facility]  
   
                                        Start: 2014   RSV Pregnant patient  
s   
and/or patients aged 60+   
years (1 - 1-dose 60+   
series)                                 RSV Pregnant patients   
and/or patients aged 60+   
years (1 - 1-dose 60+   
series)                                 [facility]  
   
                                        Start: 2004   Administration of he  
rpes   
zoster vaccine            ZOSTER VACCINES (1 of 2)  [facility]  
   
                                        Start: 2004   Screening for malign  
ant   
neoplasm of colon                                   [facility]  
   
                          Start: 2004 Zoster Vaccines (1 of 2) Zoster Vacc  
inder (1 of 2) [facility]  
   
                                        Start: 1976   DTaP/Tdap/Td Vaccine  
s (1   
- Tdap)                                 DTaP/Tdap/Td Vaccines (1   
- Tdap)                                 [facility]  
   
                                        Start: 1973   Urine screening for   
protein                                 Diabetes: Urine Protein   
Screening                               [facility]  
   
                                        Start: 1972   Hepatitis C antibody  
,   
confirmatory test         Hepatitis C Screening     [facility]  
   
                          Start: 1972 Hepatitis C screening Hepatitis C Sc  
reening [facility]  
   
                                        Start: 1970   COVID-19 Vaccine (1   
of   
2)                                      COVID-19 Vaccine (1 of   
2)                                      [facility]  
   
                                        Start: 1966   Adolescent depressio  
n   
screening assessment                    Depression Screening   
(PHQ9)                                  [facility]  
   
                          Start: 1966 COVID-19 Vaccine (1) COVID-19 Vaccin  
e (1) [facility]  
   
                                        Start: 1966   Depression screening  
   
using PHQ-9 (Patient   
Health Questionnaire 9)   
score                                               [facility]  
   
                                        Start: 1964   Diabetic foot   
examination                                         [facility]  
   
                          Start: 1964 Glaucoma screening              [facility]  
   
                                        Start: 1964   Microalbumin   
measurement, urine,   
quantitative              Urine Microalbumin        [facility]  
   
                                        Start: 1964   Ophthalmic examinati  
on   
and evaluation            Ophthalmology Exam        [facility]  
   
                                        Start: 1964   Urine screening for   
protein                   Urine Microalbumin        [facility]  
   
                                        Start: 1960   Pneumococcal Vaccine  
:   
65+ Years (1 - PCV)                     Pneumococcal Vaccine:   
65+ Years (1 - PCV)                     [facility]  
   
                                        Start: 1960   Pneumococcal Vaccine  
:   
Age 65+ (1 - PCV)                       Pneumococcal Vaccine:   
Age 65+ (1 - PCV)                       [facility]  
   
                                        Start: 1960   Pneumococcal Vaccine  
:   
Age 65+ (1 of 2 -   
PPSV23)                                 Pneumococcal Vaccine:   
Age 65+ (1 of 2 -   
PPSV23)                                 [facility]  
   
                                        Start: 1957   History and physical  
   
examination, annual for   
health maintenance        Wellness Visit            [facility]  
   
                                        Start: 1954   Abdominal aortic   
aneurysm screening                      Abdominal Aortic   
Ultrasound                              [facility]  
   
                          Start: 1954 Fall risk assessment Falls Risk Asse  
ssment [facility]  
   
                                        Start: 1954   Prostate specific   
antigen measurement       PSA Level                 [facility]  
   
                                        Start: 1954   Screening for malign  
ant   
neoplasm of colon                                   [facility]  
   
                                        Start: 1954   Screening for malign  
ant   
neoplasm of lung                        Low-dose CT Lung Cancer   
Screen                                  [facility]  
   
                                        Start: 1954   US scan of abdominal  
   
aorta                                   Abdominal Aortic   
Ultrasound                              [facility]  
   
                          Start: 1954 Yearly Adult Physical Yearly Adult P  
[facility]  
   
                                        Start: 1954   Hepatitis C antibody  
,   
confirmatory test         HEPATITIS C SCREENING     [facility]  
   
                          Start: 1954 Protein mass conc              [facility]  
[facility]  
   
                                                    Start: 1954  
End: 1954     Tetanus vaccination                     [facility]  
   
                                                      
End: 2022           12 lead ECG               ECG 12 Lead ECG Routine   
Typical atrial flutter   
(HCC) 2 Occurrences   
starting 2021   
until 2022                        [facility]  
Work Phone:   
[phone]  
   
                                        Comment on above:   2 Occurrences starti  
ng 2021 until 2022   
   
                                                      
End: 2023           12 lead ECG               ECG 12 lead ECG Routine   
ANAYA (dyspnea on   
exertion) 1 Occurrences   
starting 2022   
until 2023                        [facility]  
   
                                        Comment on above:   1 Occurrences starti  
ng 2022 until 2023   
   
                                                      
End: 2024           12 lead ECG               ECG 12 Lead ECG Routine   
Benign essential   
hypertension 3   
Occurrences starting   
2023 until   
2024                              [facility]  
Work Phone:   
[phone]  
   
                                        Comment on above:   3 Occurrences starti  
ng 2023 until 2024   
   
                                                      
End: 2029           12 lead ECG               ECG 12 lead ECG Routine   
Benign essential   
hypertension Permanent   
atrial fibrillation   
(HCC) Coronary artery   
disease involving native   
coronary artery of   
native heart without   
angina pectoris 10   
Occurrences starting   
2024 until   
2029                              [facility]  
Work Phone:   
[phone]  
   
                                        Comment on above:   10 Occurrences start  
ing 2024 until 2029   
   
                                                12 lead ECG     ECG 12 lead ECG   
Routine   
Chronic atrial   
fibrillation (HCC) ANAYA   
(dyspnea on exertion)   
Coronary artery disease   
involving native   
coronary artery of   
native heart without   
angina pectoris   
2024 10:48 AM EDT                 [facility]  
Work Phone:   
[phone]  
   
                                                      
End: 12-           24 Hour ECG               Holter monitor - 24 hour   
Cardiac Services Routine   
Chronic atrial   
fibrillation (HCC) 1   
Occurrences starting   
2020 until   
12/15/2021                              [facility]  
   
                                        Comment on above:   1 Occurrences starti  
ng 2020 until 12/15/2021   
   
                                                      
End: 2020           24 Hour ECG               Holter monitor - 24 hour   
Cardiac Services Routine   
Chronic atrial   
fibrillation (HCC) Once   
for 1 Occurrences   
starting 2020   
until 2020                        [facility]  
   
                                        Comment on above:   Once for 1 Occurrenc  
es starting 2020 until 2020   
   
                                                      
End: 2024                         Basic metabolic 2000   
panel - Serum or Plasma                 Basic metabolic panel   
Lab Routine ANAYA (dyspnea   
on exertion) 1   
Occurrences starting   
2023 until   
2024                              [facility]  
Work Phone:   
[phone]  
   
                                        Comment on above:   1 Occurrences starti  
ng 2023 until 2024   
   
                                                            Basic metabolic 2000  
   
panel - Serum or Plasma                 Basic metabolic panel   
Lab Routine ANAYA (dyspnea   
on exertion) 2023   
2:57 PM EST                             [facility]  
   
                                                      
End: 12-           Echocardiography          Echocardiogram complete   
Echocardiography Routine   
Chronic atrial   
fibrillation (HCC) 1   
Occurrences starting   
2020 until   
12/15/2021                              [facility]  
   
                                        Comment on above:   1 Occurrences starti  
ng 2020 until 12/15/2021   
   
                                                      
End: 2023           Echocardiography          Echocardiogram complete   
Echocardiography Routine   
ANAYA (dyspnea on   
exertion) 1 Occurrences   
starting 2022   
until 2023                        [facility]  
Work Phone:   
[phone]  
   
                                        Comment on above:   1 Occurrences starti  
ng 2022 until 2023   
   
                                                      
End: 2024                         Lipid 1996 panel - Serum   
or Plasma                               Lipid panel Lab Routine   
ANAYA (dyspnea on   
exertion) 1 Occurrences   
starting 2023   
until 2024                        [facility]  
   
                                        Comment on above:   1 Occurrences starti  
ng 2023 until 2024   
   
                                                            Lipid 1996 panel - S  
giovany   
or Plasma                               Lipid panel Lab Routine   
ANAYA (dyspnea on   
exertion) 2023   
2:57 PM EST                             [facility]  
   
                                                      
End: 2024                         MRI of lumbar spine   
without contrast                        MR Lumbar Spine Without   
Contrast Imaging Routine   
Left-sided back pain,   
unspecified back   
location, unspecified   
chronicity Lumbar   
degenerative disc   
disease 1 Occurrences   
starting 2023   
until 2024                        [facility]  
Work Phone:   
[phone]  
   
                                        Comment on above:   1 Occurrences starti  
ng 2023 until 2024   
   
                                                      
End: 12-                         Radionuclide myocardial   
perfusion study                         NM Myocardial Perfusion   
Multiple SPECT Imaging   
Routine Chronic atrial   
fibrillation (HCC) 1   
Occurrences starting   
2020 until   
12/15/2021                              [facility]  
   
                                        Comment on above:   1 Occurrences starti  
ng 2020 until 12/15/2021   
   
                                                      
End: 2025           SPECT Heart perfusion     NM Myocardial Perfusion   
Multiple SPECT Imaging   
Routine Chronic atrial   
fibrillation (HCC) ANAYA   
(dyspnea on exertion)   
Coronary artery disease   
involving native   
coronary artery of   
native heart without   
angina pectoris   
Permanent atrial   
fibrillation (HCC)   
Shortness of breath 1   
Occurrences starting   
2024 until   
2025                              [facility]  
Work Phone:   
[phone]  
   
                                        Comment on above:   1 Occurrences starti  
ng 2024 until 2025   
   
                                                      
End: 2024                         XR Lumbar Spine 2-3   
Views (Standard)                        XR Lumbar Spine 2-3   
Views (Standard) Imaging   
Routine Left-sided back   
pain, unspecified back   
location, unspecified   
chronicity 1 Occurrences   
starting 2023   
until 2024                        [facility]  
Work Phone:   
[phone]  
   
                                        Comment on above:   1 Occurrences starti  
ng 2023 until 2024   
   
                                                    NEGATED:   
Highlighted row has   
been ruled out!                                     Planned Goals not   
documented                              [facility]   
[facility]  
Work Phone:   
[phone]  
  
  
  
Immunizations  
  
  
                      Immunization Date Immunization Notes      Care Provider Elisa mallory  
   
                                        2024          zoster vaccine   
recombinant                                         Jason Bonilla MD  
Work Phone:   
[phone]                          [facility]  
Work Phone:   
[phone]  
   
                                        2023          Flu vaccine,   
quadrivalent,   
high-dose, preservative   
free, age 65y+   
(FLUZONE)                                           Jason Bonilla MD  
Work Phone:   
[phone]                          [facility]  
Work Phone:   
[phone]  
   
                                        2023          zoster vaccine   
recombinant                                         Jason Bonilla MD  
Work Phone:   
[phone]                          [facility]  
Work Phone:   
[phone]  
   
                                        2023          influenza virus   
vaccine, unspecified   
formulation                                         Carolee Jauregui   
[facility]  
   
                                        2023          Pneumococcal conjuga  
te   
vaccine, 20-valent   
(PREVNAR 20)                                        Jason Bonilla MD  
Work Phone:   
[phone]                          [facility]  
   
                                        2023          Moderna COVID-19   
vaccine, bivalent, blue   
cap/gray label *Check   
age/dose*                                           Jason Bonilla MD  
Work Phone:   
[phone]                          [facility]  
Work Phone:   
[phone]  
   
                                        10-          Fluzone High-Dose   
Quadrivalent 0.7 ML   
Intramuscular   
Suspension Prefilled   
Syringe                                             Jason Bonilla  
Work Phone:   
[phone]                          -Medical   
[facility]  
Work Phone:   
[phone]  
   
                                        10-          influenza virus   
vaccine, unspecified   
formulation                                         Jason Bonilla MD  
Work Phone:   
[phone]                          [facility]  
Work Phone:   
[phone]  
   
                                        2021          Moderna COVID-19   
Vaccine 100 MCG/0.5ML   
Intramuscular   
Suspension                                          Jason Bonilla  
Work Phone:   
[phone]                          -Medical   
[facility]  
Work Phone:   
[phone]  
   
                                        2021          [place] COVID-19   
Vaccine 0.5 ML   
Intramuscular   
Suspension                                          Jasonandra Bonilla  
Work Phone:   
[phone]                          [facility]  
Work Phone:   
[phone]  
  
  
  
Payers  
  
  
                          Date         Payer Category Payer        Policy ID  
   
                                2023      [facility]   
Member Subscriber Plan /   
Payer (Effective   
2023-Present) Name:   
Nick Cloud Member ID:   
[id number] Relation to   
Subscriber: Self Name:   
Nick Cloud Subscriber   
ID: [id number] Payer ID:   
Not on file Group ID:   
[id number] Type: Not on file   
Address: [address]   
[place] [address]                    [id number]  
[id number]  
   
                          2023   Private Health Insurance              [id number]  
[id number]  
[id number]  
   
                          2023   Private Health Insurance              [facility]  
[id number]  
   
                          2015   Unknown                     
   
                          2015   Unknown                   [id number]   
[id number]  
[id number]  
   
                          2015   Unknown                   [id number]  
   
                          1955   Unknown                   [id number]   
[id number]  
[id number]  
   
                          1955   Unknown                   [id number]   
[id number]  
[id number]  
   
                          1955   Unknown                   [id number]   
[id number]  
[id number]  
   
                          1955   Unknown                   [id number]   
[id number]  
[id number]  
   
                          1955   Unknown                   [id number]   
[id number]  
[id number]  
   
                          1955   Unknown                   [id number]   
[id number]  
[id number]  
   
                          1954   Unknown                   [id number]   
[id number]  
[id number]  
   
                          1954   Unknown                   [id number]   
[id number]  
[id number]  
   
                          1954   Unknown                   [id number]   
[id number]  
[id number]  
   
                          1954   Unknown                   [id number]   
[id number]  
[id number]  
   
                          1954   Unknown                   [id number]   
[id number]  
.1954   Unknown                   [id number]   
[id number]  
.1954   Unknown                   [id number]   
[id number]  
.1954   Unknown                   [id number]   
[id number]  
.1954   Unknown                   [id number]   
[id number]  
.1954   Unknown                   [id number]   
[id number]  
.1954   Unknown                   [id number]   
[id number]  
.1954   Unknown                   [id number]   
[id number]  
.1954   Unknown                   [id number]   
[id number]  
.1954   Unknown                   [id number]   
[id number]  
.1954   Unknown                   [id number]   
[id number]  
.1954   Unknown                   [id number]   
[id number]  
.1955   Unknown                   [id number]   
[id number]  
.1955   Unknown                   [id number]   
[id number]  
.1955   Unknown                   [id number]   
[id number]  
.1955   Unknown                   [id number]   
[id number]  
[id number]  
   
                          1955   Unknown                   [id number]   
[id number]  
[id number]  
   
                          1955   Unknown                   [id number]   
[id number]  
[id number]  
   
                          1955   Unknown                   [id number]   
[id number]  
[id number]  
   
                          1955   Unknown                   [id number]   
[id number]  
[id number]  
   
                          1955   Unknown                   [id number]   
[id number]  
[id number]  
   
                                       Medicare                  [id number]  
   
                                       Private Health Insurance              [id number]  
[id number]  
  
  
  
Social History  
  
  
                          Date         Type         Detail       Facility  
   
                                                    Start: 2019  
End: 2023                         Tobacco smoking status   
NHIS                      Never smoker              [facility]  
   
                          Start: 1955 Sex Assigned At Birth Not on file  O  
[facility]  
   
                                                    Start: 2020  
End: 2023                         Tobacco use and   
exposure                  Never used                [facility]  
   
                                                    Start: 2020  
End: 2024           Alcohol intake            Current drinker of   
alcohol (finding)                       [facility]  
   
                                                    Start: 2022  
End: 2024                         Exposure to SARS-CoV-2   
(event)                   Not sure                  [facility]  
   
                                                    Start: 2020  
End: 2023                         Tobacco smoking status   
NHIS                      Former smoker             [facility]  
   
                                                    Start: 1973  
End: 2008     History of tobacco use Current smoker      [facility]  
   
                                                    Start: 2020  
End: 10-     Non-smoker          Non-smoker          [facility]   
[facility]  
Work Phone:   
[phone]  
   
                                                    Start: 1973  
End: 2008     History of tobacco use Cigarette Smoker    [facility]  
   
                                                    Start: 2023  
End: 10-     Tobacco use panel                       [facility]  
  
  
  
Functional Status  
  
  
                          Date         Assessment   Result       Facility  
   
                                                    NEGATED: Highlighted   
row                       Functional performance    Functional status   
health issues are not   
documented Disease                      [facility]  
Work Phone:   
[phone]  
  
  
  
Mental Status  
  
  
                          Date         Assessment   Result       Facility  
   
                                                    NEGATED: Highlighted   
row                                     Cognitive function   
[Interpretation]                        Cognitive status   
health issues are not   
documented Disease                      [facility]   
[facility]  
Work Phone:   
[phone]  
  
  
  
Clinical Notes 2014 to 2024  
Jason Bonilla MD - 2024 9:40 AM Sylvia Bonilla MD - 10/07/2024  
 10:40 AM Carolee Dominguez MA - 2024 11:01 AM Rj Mccoy MD - 2024 3:44 PM EDT  
  
                                Note Date & Type Note            Facility  
   
                                                    2024 History of   
Present illness Narrative               Formatting of this note is   
different from the original.  
Subjective  
Patient ID: Nick Cloud is a 69 y.o.   
male who presents for Follow-up (2   
months follow up stress test,   
anemia, DM, HTN, HL. Pedal edema,   
SOB).  
  
HPI  
Blood pressure stable on   
medication. Continue same doses.  
A1c is up to 7.4 but he is taking   
the metformin 500 mg short acting   
once a day. Not getting any   
dizziness.  
Increase metformin to twice a day  
Start Jardiance 10 mg daily. EF was   
approximately mid 40s on the PET CT   
scan.  
  
Is changing cardiology to somebody   
in [place] will see them in the   
next couple weeks.  
  
The anemia has corrected itself   
with the daily iron. Hemoglobin is   
up to 13.5. Continue daily iron for   
now. Discussed that he was likely   
just iron deficient and will not   
need to use iron lifelong. But he   
also does take the blood thinner   
and we will have to watch over   
time.  
  
Office visit only 1 month. He how   
he is doing with the higher dose of   
the metformin on the new Jardiance.  
  
Review of Systems  
Constitutional: Negative for   
fatigue.  
Respiratory: Positive for shortness   
of breath.  
Cardiovascular: Negative for chest   
pain and palpitations.  
Gastrointestinal: Negative for   
nausea.  
Skin: Negative for rash.  
  
Objective  
/74   Pulse 84   Ht 1.765 m   
(5' 9.5 )   Wt 118 kg (260 lb 6.4   
oz)   SpO2 95%   BMI 37.90 kg/m  
  
Physical Exam  
Constitutional:  
Appearance: Normal appearance.  
Skin:  
General: Skin is warm and dry.  
Neurological:  
General: No focal deficit present.  
Mental Status: He is alert and   
oriented to person, place, and   
time.  
Psychiatric:  
Mood and Affect: Mood normal.  
Behavior: Behavior normal.  
Judgment: Judgment normal.  
  
Assessment/Plan  
Problem List Items Addressed This   
Visit  
  
ICD-10-CM  
Controlled type 2 diabetes mellitus   
without complication, without   
long-term current use of insulin   
(Multi) - Primary E11.9  
Relevant Medications  
metFORMIN (Glucophage) 500 mg   
tablet  
empagliflozin (Jardiance) 10 mg  
Hypertension, essential I10  
Iron deficiency anemia due to   
chronic blood loss D50.0  
  
  
Electronically signed by Jason Bonilla MD at 2024 10:18 AM   
EST  
documented in this encounter            [facility]  
Work Phone:   
[phone]  
   
                                                    10- History of   
Present illness Narrative               Formatting of this note is   
different from the original.  
Subjective  
Patient ID: Nick Colud is a 69 y.o.   
male who presents for Follow-up (2   
month medication check; HTN, DM;   
Review labs).  
  
HPI  
Just had a stress test that was   
abnormal showed a severe defect but   
only 5% ischemic burden. Possible   
low EF. He has not heard from   
cardiology yet. If there is a large   
area do wonder if he is going to   
need a cath.  
Dizzy again when he restarted the   
metformin. Stopped it. Again about   
restarting it because of blood   
sugars up. I recommend he hold it   
at this time. Should if he is going   
to need a cath.  
After about pioglitazone and   
Jardiance. Computer is no help.  
No diabetic medicine for now. A1c   
in 2 months  
Blood pressure stable on   
medications  
Fill ED medicine  
  
Does have anemia. January CBC was   
14.6. First check in the hospital   
was 10.6 most recent check is 10.5.   
Anoscopy  diverticular disease.   
No melena or hematochezia. B12   
level was 711. Check ferritin and   
iron level in 2 months.  
  
Just unknown is what is going to   
happen with his heart.  
Lab and office visit 2 months  
  
Review of Systems  
Constitutional: Positive for   
fatigue.  
Respiratory: Positive for shortness   
of breath. Negative for cough.  
Cardiovascular: Positive for leg   
swelling. Negative for chest pain   
and palpitations.  
Gastrointestinal: Negative for   
abdominal pain, blood in stool,   
diarrhea and nausea.  
Genitourinary: Negative for   
hematuria.  
  
Objective  
/80   Pulse 77   Ht 1.765 m   
(5' 9.5 )   Wt 117 kg (258 lb 6.4   
oz)   SpO2 96%   BMI 37.61 kg/m  
  
Physical Exam  
Constitutional:  
Appearance: Normal appearance.  
HENT:  
Head: Normocephalic and atraumatic.  
Cardiovascular:  
Rate and Rhythm: Normal rate and   
regular rhythm.  
Heart sounds: Normal heart sounds.  
Pulmonary:  
Effort: Pulmonary effort is normal.  
Breath sounds: Normal breath   
sounds.  
Skin:  
General: Skin is warm and dry.  
Neurological:  
General: No focal deficit present.  
Mental Status: He is alert and   
oriented to person, place, and   
time.  
Psychiatric:  
Mood and Affect: Mood normal.  
Behavior: Behavior normal.  
Thought Content: Thought content   
normal.  
Judgment: Judgment normal.  
  
Assessment/Plan  
Problem List Items Addressed This   
Visit  
  
ICD-10-CM  
Controlled type 2 diabetes mellitus   
without complication, without   
long-term current use of insulin   
(Multi) - Primary E11.9  
Relevant Orders  
Basic Metabolic Panel  
Hemoglobin A1C  
ED (erectile dysfunction) of   
organic origin N52.9  
Relevant Medications  
sildenafil (Viagra) 50 mg tablet  
Hypertension, essential I10  
Iron deficiency anemia due to   
chronic blood loss D50.0  
Relevant Orders  
CBC  
Ferritin  
Iron and TIBC  
  
  
Electronically signed by Jason Bonilla MD at 10/07/2024 11:16 AM   
EDT  
documented in this encounter            [facility]  
Work Phone:   
[phone]  
   
                                                    2024 History of   
Present illness Narrative               Formatting of this note might be   
different from the original.  
Pt came in today for an EKG with   
complaints of SOB.  
Electronically signed by Carolee Jauregui MA at 2024 11:02 AM   
EDT  
documented in this encounter            [facility]  
   
                                        2024 Note     Education Pharmacy -  
   
Anticoagulation Entered On:   
2024 10:59 EDT  
Performed On: 2024 10:59 EDT   
by Сергей Lux RPh  
  
  
  
  
Education Pharmacy Anticoagulation  
Barriers to Learning : None evident  
TeachBack Methodology : TeachBack  
Anticoagulant on Discharge :   
Rivaroxaban  
Shayne Swan RPh -   
2024 8:30 EDT  
Teach Back Notes :  jules xarelto   
20mg home med continued here  
  
Сергей Lux RPh - 2024   
10:59 EDT                               [facility]  
   
                                        Comment on above:   Order Comment: Marce walker for pharmacy education by discern   
rule for patient ordered anticoagulant   
   
                                        2024 Note     Patient Education Yue howard  
Cardiovascular  
Fainting: Care Instructions  
Your Care Instructions  
When you faint, or pass out, you   
lose consciousness for a short   
time. A brief drop in blood flow to   
the brain often causes it. When you   
fall or lie down, more blood flows   
to your brain and you regain   
consciousness.  
Emotional stress, pain, or   
overheatingespecially if you have   
been standingcan make you faint. In   
these cases, fainting is usually   
not serious. But fainting can be a   
sign of a more serious problem.   
Your doctor may want you to have   
more tests to rule out other   
causes.  
The treatment you need depends on   
the reason why you fainted.  
The doctor has checked you   
carefully, but problems can develop   
later. If you notice any problems   
or new symptoms, get medical   
treatment right away.  
Follow-up care is a key part of   
your treatment and safety. Be sure   
to make and go to all appointments,   
and call your doctor if you are   
having problems. It's also a good   
idea to know your test results and   
keep a list of the medicines you   
take.  
How can you care for yourself at   
home?  
? Drink plenty of fluids to prevent   
dehydration. If you have kidney,   
heart, or liver disease and have to   
limit fluids, talk with your doctor   
before you increase your fluid   
intake.  
When should you call for help?  
Call 911 anytime you think you may   
need emergency care. For example,   
call if:  
? You have symptoms of a heart   
problem. These may include:  
? Chest pain or pressure.  
? Severe trouble breathing.  
? A fast or irregular heartbeat.  
? Lightheadedness or sudden   
weakness.  
? Coughing up pink, foamy mucus.  
? Passing out.  
After you call 911, the    
may tell you to chew 1   
adult-strength or 2 to 4 low-dose   
aspirin. Wait for an ambulance. Do   
not try to drive yourself.  
? You have symptoms of a stroke.   
These may include:  
? Sudden numbness, tingling,   
weakness, or loss of movement in   
your face, arm, or leg, especially   
on only one side of your body.  
? Sudden vision changes.  
? Sudden trouble speaking.  
? Sudden confusion or trouble   
understanding simple statements.  
? Sudden problems with walking or   
balance.  
? A sudden, severe headache that is   
different from past headaches.  
? You passed out (lost   
consciousness) again.  
Watch closely for changes in your   
health, and be sure to contact your   
doctor if:  
? You do not get better as   
expected.  
Where can you learn more?  
Go to  
https://www.[employer].net/patientE  
d  
Enter A848 in the search box to   
learn more about Fainting: Care   
Instructions.  
Current as of: 2022   
Content Version: 13.3  
? 8768-6861 [employer].  
Care instructions adapted under   
license by your healthcare   
professional. If you have questions   
about a medical condition or this   
instruction, always ask your   
healthcare professional.   
[employer] disclaims   
any warranty or liability for your   
use of this information. Low Blood   
Pressure: Care Instructions  
Your Care Instructions  
Blood pressure is a measurement of   
the force of the blood against the   
walls of the blood vessels during   
and after each beat of the heart.   
Low blood pressure is also called   
hypotension. It means that your   
blood pressure is much lower than   
normal. Some people, especially   
young, slim women, may have   
slightly low blood pressure without   
symptoms. But in many people, low   
blood pressure can cause symptoms   
such as feeling dizzy or   
lightheaded. When your blood   
pressure is too low, your heart,   
brain, and other organs do not get   
enough blood.  
Low blood pressure can be caused by   
many things, including heart   
problems and some medicines.   
Diabetes that is not under control   
can cause your blood pressure to   
drop. And so can a severe allergic   
reaction or infection. Another   
cause is dehydration, which is when   
your body loses too much fluid.  
Treatment for low blood pressure   
depends on the cause.  
Follow-up care is a key part of   
your treatment and safety. Be sure   
to make and go to all appointments,   
and call your doctor if you are   
having problems. It's also a good   
idea to know your test results and   
keep a list of the medicines you   
take.  
How can you care for yourself at   
home?  
? Be safe with medicines. Call your   
doctor if you think you are having   
a problem with your medicine. You   
will get more details on the   
specific medicines your doctor   
prescribes.  
? If you feel dizzy or lightheaded,   
sit down or lie down for a few   
minutes. Or you can sit down and   
put your head between your knees.   
This will help your blood pressure   
go back to normal and help your   
symptoms go away.  
? Follow your doctor's suggestions   
for ways to prevent symptoms like   
dizziness. These suggestions may   
include:  
? Get up slowly from bed or after   
sitting for a long time. If you are   
in bed, roll to your side and swing   
your legs over the edge of the bed   
and onto the floor. Push your body   
up to a sitting position. Wait for   
a while before you slowly stand up.  
? Add more salt to your diet, if   
your doctor recommends it.  
? Drink plenty of fluids. Choose w   
(more content not included)...          [facility]  
   
                                        2024 Note     Nursing Discharge Macdonald  
mmary Entered   
On: 2024 12:29 EDT  
Performed On: 2024 12:28 EDT   
by Chanel Truong RN, DC Information  
Discharged to : Home  
Reg VTE Warfarin at Discharge : No  
Chanel Truong RN - 2024   
12:28 EDT  
Education  
TeachBack Methodology : TeachBack,   
Explanation  
Barriers to Learning : None evident  
Chanel Truong RN - 2024   
12:28 EDT                               [facility]  
   
                                        2024 Note     NICK CLOUD  
:1955  
MRN:[id number]  
FIN:[id number]  
Registration Date:2024  
Admission Information  
Admit Date/Time:2024 20:03   
EDT  
  
Discharge Date  
2024 12:14  
Admitting Physician -  
DONNA SANDOVAL DO  
Attending Physician -  
DONNA SANDOVAL DO  
Primary Care Physician -  
JASON BONILLA All Diagnoses   
This Visit  
Near syncope  
Hypotension  
Acute kidney injury  
Syncope/Near syncope  
  
Discharge Medications  
Unchanged  
aspirin (aspirin 81 mg oral delayed   
release tablet)1 Tabs Oral DAILY.  
atorvastatin (Lipitor 40 mg oral   
tablet)1 Tabs Oral AT BEDTIME.  
dilTIAZem (dilTIAZem 120 mg oral   
tablet)1 Tabs Oral DAILY WITH   
LUNCH.  
esomeprazole (NexIUM 40 mg oral   
delayed release capsule)1 Capsules   
Oral DAILY.  
furosemide (Lasix 40 mg oral   
tablet)1 Tabs Oral DAILY.  
lisinopril (Zestril 20 mg oral   
tablet)1 Tabs Oral DAILY.  
metFORMIN = Glucophage (metFORMIN   
500 mg oral tablet)1 Tabs Oral   
TWICE A DAY WITH MEALS.  
metoprolol (Lopressor 50 mg oral   
tablet)1 Tabs Oral EVERY TWELVE   
HOURS.  
nitroglycerin (Nitrostat 0.4 mg   
sublingual tablet)1 Tabs Sublingual   
EVERY FIVE MINUTES as needed as   
needed for chest pain.  
potassium chloride (KCL) (Potassium   
Chloride (Eqv-Klor-Con M20) 20 mEq   
oral tablet, extended release)1   
Tabs Oral DAILY.  
rivaroxaban (Xarelto 20 mg oral   
tablet)1 Tabs Oral EVERY EVENING.  
  
Hospital Course  
This is a pleasant 69-year-old male   
with a past medical history of   
atrial fibrillation,   
hyperlipidemia, hypertension and   
obesity. Patient had presyncopal   
episode yesterday after being out   
in the hot sun and watching his   
daughter at a motorcycle race.   
Patient became dizzy and nauseated.  
On presentation to the ER, patient   
was mildly borderline hypotensive   
110/73, he was 94% on room air he   
was found to have serum creatinine   
of 1.3 indicating some dehydration.   
D-dimer was 552 but corrected for   
age. CT angiography was done and is   
negative for pulmonary embolism,   
but read as questionable pneumonia.   
Patient has no signs and symptoms   
of pneumonia, no fever, no   
leukocytosis and negative   
procalcitonin. Antibiotics were   
deferred at this time.  
Patient was given gentle IV fluids   
and symptoms have improved. Final   
reports are pending for venous   
duplex ultrasound of the lower   
extremities and carotid arteries,   
but preliminary reports indicate   
that the patient does not have s   
evidence of DVT bilaterally, and   
may have some mild bilateral   
carotid stenosis. Patient does   
really want to go home today as he   
has an appointment with PCP   
tomorrow, which I feel is   
reasonable despite the fact that   
full carotid ultrasound has not   
been read. Patient is currently on   
aspirin and statin which would be   
indicated for mild stenosis in any   
case, and patient can follow-up   
with PCP for the results of the   
final studies. Patient may also   
benefit from outpatient   
echocardiogram, but will defer to   
his home cardiology team for this   
as well.  
Orthostatics are negative, JOCELYNE has   
resolved and he is asymptomatic and   
ambulating well to the bathroom. He   
is medically optimized for   
discharge home.  
  
Significant Findings  
(2024 19:22 EDT CT ANGIO   
CHEST)  
  
* Final Report *  
  
Reason For Exam  
PAIN  
  
Report  
EXAM:  
  
CT chest angiogram with contrast.  
  
INDICATION:  
  
Chest pain.  
  
TECHNIQUE:  
  
Contiguous axial CT images of the   
chest.  
Intravenous contrast: Present.  
Protocol: Pulmonary embolus (PE)   
protocol angiogram.  
Reformats: MIPs and MPRs created   
and utilized.  
DLP 1148 mGy-cm.  
This exam was performed according   
to our departmental   
dose-optimization program, which   
includes automated exposure   
control, adjustment of the mA   
and/or kV according to patient size   
and/or use of iterative   
reconstruction technique.  
  
Note:  
LV=left ventricle.  
RV=right ventricle.  
  
COMPARISON:  
  
None.  
  
FINDINGS:  
  
Upper abdomen: Partially imaged.   
Cholelithiasis. Right renal cysts.  
  
Thoracic aorta: Mild   
atherosclerotic calcifications.  
  
Heart: No right atrial thrombus.   
Atherosclerotic calcifications of   
the coronary arteries.  
RV/LV ratio: Within normal limits.  
  
Pulmonary arteries:  
Technical: Adequate opacification   
to the level of the segmental   
vessels.  
Pulmonary embolus: No low-density   
filling defect to suggest acute PE.  
Overall embolic burden: None.  
  
Mediastinum: No pathologic sized   
middle mediastinal lymphadenopathy.  
  
Tracheobronchial tree:   
Unremarkable.  
  
Lungs:  
Lobar consolidation: Patchy   
airspace opacities in the left   
lower lobe.  
Pleural effusion: Negative.  
Pneumothorax: Negative.  
Other: Negative.  
  
Bones: Multilevel spondylosis.  
  
IMPRESSION:  
1. No CT evidence of acute PE.  
2. Left lower lobe pneumonia.  
3. Cholelithiasis.  
  
Electronically signed by: Sonu Segura MD 2024 07:34 PM EDT RP   
Workstation: [id number]  
  
Signature Line  
Technologist: DONA LEVINE  
Dictated By: SONU SEGURA MD  
Signed By: SONU SEGURA MD  
  
Signed Out: 24 19:34:12 [1]  
  
  
Physical Exam  
Vitals & Measurements  
T: 36.6 ?C (Oral) TMIN: 36.5 ?C   
(Oral) T (more content not   
included)...                            [facility]  
   
                                        2024 Note     NICK CLOUD  
:1955  
MRN:[id number]  
FIN:[id number]  
Registration Date:2024  
Chief Complaint  
I got very sick.  
  
History of Present Illness  
Mr. Cloud is a 69 year old   
gentleman who presents following an   
episode of dizziness, nausea,   
vomiting and diaphoresis. He was at   
a local bar where an event was   
being hosted. His daughter was a   
participant. He had been outside   
for about an hour. He got up and   
went inside. He sat on a bar stool.   
He felt nauseous. His spouse   
reports he had a little emesis. He   
then got very hot and dizzy. The   
thought he was going to pass out He   
started to lean to one side. People   
around were able to grab him and   
lowered him to the ground. He did   
not have true syncope. He was   
diaphoretic. He has a history of   
CAD. His spouse gave him SL NTG x   
1. Of note, he did not have any   
chest pain. When EMS arrived, his   
blood pressure was 123/77. HR was   
84. Blood sugar was 198. He was   
transported to [facility] emergency room. He   
vomited en route.  
On presentation to the emergency   
room, his blood pressure was   
110/73. HR was 70. RR was 20. Pulse   
ox was 94 % on room air. Cr was   
1.3. Alcohol was < 3. Of note, he   
had a couple beers earlier in the   
day. D-dimer was 552 but corrects   
for age (also on rivaroxaban for   
atrial fibrillation). Orthos were   
ordered but yet to be done. he was   
received NSB. He is being admitted   
for observation. CT done in the ER   
was read as PNA. He has no clinical   
signs of PNA.  
Past Medical History  
- Pre-diabetes  
- HLP  
- HTN  
- Atrial fibrillation  
- CAD  
- Obesity  
Past Surgical History  
- LHC (no stent)  
- Appendectomy  
- Tonsillectomy  
- [place] teeth extraction  
Social History  
- Quit smoking in . Social EtOH   
use.  
Family History  
- mother  at 91 from apparent   
old age. Father  from DAMIAN   
cirrhosis.  
  
  
Review of Systems  
General: no fever, chills or night   
sweats, weight stable.  
Skin: no rashes or lesions.  
Head: no headaches.  
Ears: hearing loss  
Eyes: no blurry vision  
Nose: no discharge.  
Throat: no dry mouth.  
Neck: no pain or swelling  
Respiratory: chronic non productive   
cough.  
Cardiovascular: no chest pain, no   
leg swelling  
GI: nausea and vomiting.  
: No frequency or urgency.  
Vascular: no calf pain with   
walking.  
Musculoskeletal: no joint pain or   
muscle pain  
Hematologic: no easy bleeding or   
bruising  
Endocrine: no heat or cold   
intolerance  
Neuro: dizziness  
  
  
Physical Exam  
Vitals & Measurements  
T: 36.5 ?C (Oral) TMIN: 36.5 ?C   
(Oral) TMAX: 37 ?C (Oral) HR: 84   
(Peripheral) RR: [Image Removed]23   
BP: 116/72 SpO2: 95% WT: 120.1 kg   
WT: 120.1 kg (Dosing) BMI: 39.1  
General: alert, no acute distress,   
lying in bed, obese.  
HENT: normocephalic, atraumatic,   
pupils equal round and reactive to   
light. Extraocular movements are   
intact.  
Neck: supple, non-tender, no   
carotid bruit.  
Heart: irregularly irregular. No   
murmurs, rubs or gallops, normal S1   
& S2.  
Lungs: clear to auscultation   
bilaterally, no wheezing or   
rhonchi.  
Abdomen: soft, obese, nontender,   
normal bowel sounds.  
Extremities: trace edema. No   
cyanosis or clubbing.  
Neuro: alert, oriented to time,   
person and place. No focal   
neurologic deficits.  
Psychiatry: appropriate mood and   
affect. Thought process is linear.   
Speech is not pressured.  
Skin: no rashes or lesions.  
  
  
Assessment/Plan  
Syncope and collapse: had been   
standing outside for an hour. Went   
indoors and sat down. Started to   
get sick. Was about to pass out but   
was caught by bystanders. Was   
diaphoretic. Had nausea and emesis.   
The increased vagal tone suggests   
vasovagal syncope. Differential   
also includes orthostatic   
hypotension (although less likely   
since he was actually sitting) and   
arrhythmia (h/o a-fib and   
non-occlusive CAD).  
- Admit for observation.  
- Monitor on telemetry.  
- TTE, carotid US, B12, folate,   
vitamin D, AM cortisol.  
Acute kidney failure, unspecified:   
suspect pre-renal etiology. Was   
also on furosemide, metformin and   
lisinopril prior to admission.  
- Assess response to IVF.  
- Avoid nephrotoxic agents.  
- Monitor intake and output.  
Unspecified atrial fibrillation:   
currently in rate controlled atrial   
fibrillation.  
- Continue metoprolol, diltiazem   
and rivaroxaban.  
- Monitor on telemetry.  
- Keep K > 4, Mg > 2.  
Abnormal coagulation profile:   
d-dimer 552 but corrects for age   
(also on rivaroxaban for atrial   
fibrillation). CT PE and   
preliminary LE US negative.  
- Follow up on final LE US report.  
Hyperlipidemia, unspecified:  
- Continue atorvastatin.  
Essential (primary) hypertension:   
Blood pressure is on the lower   
side.  
- Continue metoprolol.  
- Hold furosemide and lisinopril.  
- Gentle hydration as above.  
Other nonspecific abnormal finding   
of lung field: CT read as PNA.   
However, he has no chest pain or   
shortness of breath. His chronic   
cough is unchanged. No fever. WBC   
is normal. No clinical evidence of   
PNA. No indication to treat.  
Obesity, unspecified:  
- Weight loss encouraged.  
Code status: Full.  
Disposition: An (more content not   
included)...                            [facility]  
   
                                        2024 Note     Immunization Screeni  
ng Entered On:   
2024 21:32 EDT  
Performed On: 2024 21:31 EDT   
by Milagro Ortiz RN  
  
  
  
  
Immunization Screening  
Immunizations Current : Yes  
Last Tetanus : Greater than 5 years  
COVID-19 Fully Vaccinated : Yes  
COVID-19 Vaccine Received : Unknown  
COVID Booster Received : Yes  
Milagro Ortiz RN - 2024 21:31   
EDT                                     [facility]  
   
                                        Comment on above:   Order Comment: Order  
 entered secondary to inpatient   
admission.   
   
                                                            Result Comment:  
Electronically Signed by:  
Milagro Ortiz RN  
on 2024 21:31 EDT   
   
                                        2024 Note     ED Nursing Discharge  
 Summary   
Entered On: 2024 21:01 EDT  
Performed On: 2024 20:57 EDT   
by Thanh Pickering RN  
  
  
  
  
DC Information [id number]  
ED IV's : Continue upon transfer  
[facility]  
[facility] Vitals Completed : Yes  
ED Final Assessment Completed : Yes  
ED Progress Note Completed : Yes  
Complete all PRN/Pain response   
forms? : N/A  
ED Disassociate Patient from   
Monitor : N/A  
Updated Depart Time : Yes  
ED Belongings sent w patient [id number]   
: Yes  
Valuables Complete : No  
Thanh Pickering RN - 2024 21:00   
EDT  
Education  
Instructions given to : Patient  
TeachBack Methodology : Explanation  
Barriers to Learning : None evident  
Thanh Pickering RN - 2024 21:00   
EDT  
Post-Hospital Education Adult Grid  
Plan of Care : Verbalizes   
understanding  
Thanh Pickering RN - 2024 21:00   
EDT  
ED Assistance Summary  
Assistance Given? : No  
Thanh Pickering RN - 2024 21:00   
EDT  
Electronically Signed by:  
Thanh Pickering RN  
on 2024 21:00 EDT                 [facility]  
   
                                                    2024 History of   
Present illness Narrative               Associated Order(s): Epidural   
Injection  
Post-Procedure Diagnose(s): Lumbar   
degenerative disc disease  
Formatting of this note might be   
different from the original.  
Epidural Injection  
  
Performed by: Rj Raines MD  
Authorized by: Rj Raines MD  
Medications: 120 mg triamcinolone   
acetonide 40 mg/mL  
Anesthetic used: Lidocaine 1% PF  
  
  
Electronically signed by Rj Raines MD at 2024   
3:45 PM EDT  
Formatting of this note is   
different from the original.  
 [address]  
[facility]   
MEDICINE  
[address]  
[facility] [address] [phone]  
  
Nick Cloud is a 68 y.o. male   
being seen today, 24,  
No chief complaint on file.  
  
[chief complaint] low back pain  
  
HPI Dictation: This man's diagnosis   
lumbar degenerative disc disease   
spinal stenosis epidural injections   
relieve his pain for typically 3   
months or better last performed on   
2023  
[hpi]  
  
Physical Exam Dictation:  
[PE] increased pain with lumbar   
flexion paraspinal spasm no   
radicular send  
  
Assessment and Plan Dictation:  
[AP] epidural injection standard   
caudal approach return in 3 months   
or as needed  
  
I have reviewed all relevant   
histories, medications, allergies,   
and problem list items with Nick Cloud during this visit.  
  
Review of Systems  
Constitutional: Negative for chills   
and fever.  
HENT: Negative for congestion.  
Respiratory: Negative for shortness   
of breath.  
Cardiovascular: Negative for chest   
pain.  
Gastrointestinal: Negative for   
diarrhea, nausea and vomiting.  
Neurological: Negative for   
headaches.  
Psychiatric/Behavioral: Negative   
for behavioral problems.  
  
  
There were no vitals taken for this   
visit.  
  
Imaging:  
No results found.  
  
1. Lumbar degenerative disc disease  
  
  
Return in about 3 months (around   
2024), or if symptoms worsen or   
fail to improve.  
  
Rj Raines MD  
Electronically signed by Rj Raines MD at 2024   
3:45 PM EDT  
documented in this encounter            [facility]  
   
                                                    2024 History of   
Present illness Narrative               Formatting of this note is   
different from the original.  
  
General Cardiology Telephone Visit   
Follow-up  
Heart & Vascular  
[facility]  
2024 Mundo Cortez MD  
[address]  
[facility] [address] [phone]  
  
  
Patient: Nick Cloud  
YOB: 1955 (68 y.o.)  
Referring Provider: No ref.   
provider found  
PCP: Jason Bonilla MD  
Patient location: home  
  
Patient phone : [phone]  
This visit has been fully reviewed   
with the patient and verbal consent   
has been obtained.  
  
Virtual Visit Consent Statement: I   
discussed risks, benefits and   
alternatives of telemedicine   
consultation with the patient (and   
any accompanying persons) including   
the risks that the patient s   
personal health details and medical   
records will be discussed over   
interactive   
video/audio/telecommunication   
technology, may be recorded, and   
that there are inherent diagnostic   
limitations compared to   
face-to-face evaluations. They   
elected to proceed with the   
telemedicine consultation.  
  
Assessment & Plan  
  
CAD (coronary artery disease)  
No angina, Doing well, Medications   
reviewed and recommended to   
continue asa, atorvastatin  
Stable dyspnea on exertion, wants   
to try stopping lasix/potassium  
Followup 1 year  
  
Atrial fibrillation (HCC)  
no issues, continue xarelto,   
metoprolol, diltiazem  
Does get tired easy and sleepy,  
No snoring and sleeps well  
  
Follow-up: Return in about 1 year   
(around 2025).  
  
Pt seen for:  
Problem  
Atrial Fibrillation (Hcc)  
2016-CV at that time--typical  
Xarelto-stopped 1month after rfa  
2017 RFA-  
Chronic and xarelto restarted  
  
Cad (Coronary Artery Disease)  
 , occluded lad with   
collaterals.-EECP 40% ef  
2020-stress old infarct  
-45% apical infarct  
  
  
Chief Complaint:discussed multiple   
issues  
  
Objective  
  
ECG 12 lead  
Final Result by Mundo Cortez MD (2022 1030)  
  
  
Echocardiogram complete w contrast  
Final Result by Mundo Cortez MD (2022 1213)  
  
  
Review of Systems:  
ROS per MA list were   
reviewed--Pertinent positive and   
negative findings are also noted in   
the HPI  
Allergies:  
Patient has no known allergies.  
HOME Medications:  
Current Outpatient Medications on   
File Prior to Visit  
Medication Sig  
aspirin 81 MG EC tablet Take 1   
(one) tablet (81 mg total) by mouth   
daily .  
b complex vitamins tablet Take 1   
(one) tablet by mouth daily .  
cholecalciferol, vitamin D3, 10 mcg   
(400 unit) Chew Chew and Swallow .  
metFORMIN (GLUCOPHAGE) 500 MG   
tablet Take 1 Unspecified by mouth   
every 12 (twelve) hours .  
multivitamin per tablet Take 1   
(one) tablet by mouth daily .  
sildenafiL (VIAGRA) 50 MG tablet   
TAKE ONE TABLET BY MOUTH DAILY AS   
NEEDED FOR ERECTILE DYSFUNCTION  
urea 40 % Lotn Apply 1 Application   
topically 2 (two) times a day .  
[DISCONTINUED] atorvastatin   
(LIPITOR) 40 MG tablet Take 1 (one)   
tablet (40 mg total) by mouth daily   
.  
[DISCONTINUED] diltiazem (Cardizem   
CD) 180 MG 24 hr capsule Take 1   
(one) capsule (180 mg total) by   
mouth daily .  
[DISCONTINUED] furosemide (Lasix)   
40 MG tablet Take 1 (one) tablet   
(40 mg total) by mouth daily .  
[DISCONTINUED] lisinopriL   
(PRINIVIL,ZESTRIL) 20 MG tablet   
TAKE ONE TABLET BY MOUTH DAILY  
[DISCONTINUED] metoprolol tartrate   
(LOPRESSOR) 50 MG tablet Take 1   
(one) tablet (50 mg total) by mouth   
2 (two) times a day .  
[DISCONTINUED] nitroGLYCERIN   
(NITROSTAT) 0.4 MG SL tablet Place   
1 (one) tablet (0.4 mg total) under   
the tongue every 5 (five) minutes   
as needed for chest pain .  
[DISCONTINUED] potassium chloride   
SA (K-DUR,KLOR-CON) 20 MEQ tablet   
Take 1 (one) tablet (20 mEq total)   
by mouth daily .  
[DISCONTINUED] rivaroxaban   
(Xarelto) 20 mg Tab Take 1 (one)   
tablet (20 mg total) by mouth daily   
.  
  
No current facility-administered   
medications on file prior to visit.  
  
Provider location:  
[facility] [address] [facility]   
PHYSICIANS  
[address] [facility] [address]  
Patient location: [address] [place] [address]  
  
I have spent 11 min with the   
patient discussing current HPI.  
  
Lab Results  
Component Value Date  
CHOL 125 2023  
LDLCALC 35 2023  
TRIG 245 (H) 2023  
HDL 41 2023  
  
  
  
  
Electronically signed by Mundo Cortez MD at 2024 8:41 AM   
EST  
documented in this encounter            [facility]  
   
                                                    2024 Evaluation +   
Plan note                               Associated Problem(s): Atrial   
fibrillation (HCC)  
Formatting of this note might be   
different from the original.  
no issues, continue xarelto,   
metoprolol, diltiazem  
Does get tired easy and sleepy,  
No snoring and sleeps well  
Electronically signed by Mundo Cortez MD at 2024 8:38 AM   
EST  
                                        [facility]  
   
                                                    2024 Miscellaneous   
Notes                                   Associated Problem(s): Atrial   
fibrillation (HCC)  
Formatting of this note might be   
different from the original.  
no issues, continue xarelto,   
metoprolol, diltiazem  
Does get tired easy and sleepy,  
No snoring and sleeps well  
Electronically signed by Mundo Cortez MD at 2024 8:38 AM   
EST  
Associated Problem(s): CAD   
(coronary artery disease)  
Formatting of this note might be   
different from the original.  
No angina, Doing well, Medications   
reviewed and recommended to   
continue asa, atorvastatin  
Stable dyspnea on exertion, wants   
to try stopping lasix/potassium  
Followup 1 year  
  
Electronically signed by Mundo Cortez MD at 2024 8:39 AM   
EST  
documented in this encounter            [facility]  
   
                                                    2024 Evaluation +   
Plan note                               Associated Problem(s): CAD   
(coronary artery disease)  
Formatting of this note might be   
different from the original.  
No angina, Doing well, Medications   
reviewed and recommended to   
continue asa, atorvastatin  
Stable dyspnea on exertion, wants   
to try stopping lasix/potassium  
Followup 1 year  
  
Electronically signed by Mundo Cortez MD at 2024 8:39 AM   
EST  
                                        [facility]  
   
                                                    2023 History of   
Present illness Narrative               Formatting of this note might be   
different from the original.  
Left foot pain  
  
Patient is a pleasant 68-year-old   
male who comes in today following   
up on his back pain and new problem   
on his left foot. He states that he   
is getting smaller portions of   
callus under his foot that are   
quite sore and uncomfortable.   
States they have been there for   
about 5 weeks denies any trauma   
that he can remember. Does admit to   
some soreness other than that doing   
well.  
Regarding his back states that he   
was able to get a spine injection   
which did well he is happy he went.  
  
Physical  
Vascular: DP PT pulses are easily   
palpable 2 out of 4. CFT is fair no   
edema.  
Derm: No open wounds no ulcers no   
rashes no deep nodules.  
Neuro: Light touch is no blunted   
bilaterally.  
Musculoskeletal: Muscle strength is   
5 out of 5 with fair tone. Can   
easily wiggle toes.  
Derm: Small hyperkeratotic tissue   
to the heel lateral foot and first   
MPJ with interruption in skin lines   
but no pinpoint bleeding. No   
streaking no lymphangitis no   
fluctuance.  
  
Patient is a pleasant 68-year-old   
male with lower extremity   
radiculopathy consistent with his   
back and new onset left foot   
irretractable plantar keratosis.  
-This time continue to follow with   
Ortho for back pain.  
For the foot, went over options   
including treatment.  
Will start topical urea to try to   
get some lifting and pain   
reduction. This is called into   
[facility] 40% twice per day.   
If this is too expensive there is   
no medicine alternative other than   
to buy this online which is a good   
option. This was discussed.   
Follow-up in 3 months to   
reevaluate.  
Low medical complexity decision   
making as this will be chronic for   
more than 12 months.  
Electronically signed by Kenton Hinojosa Jr., DPM at 2023   
9:14 AM EST  
documented in this encounter            [facility]  
   
                                        2023 Instructions   
  
  
Kenton Hinojosa Jr., DPM -   
2023 9:11 AM ESTFormatting of   
this note might be different from   
the original.  
Topical Urea 40% cream.  
Apply twice per day to the sore   
spots.  
Electronically signed by Kenton Hinojosa Jr., DPM at 2023   
9:11 AM EST  
  
documented in this encounter            [facility]  
   
                                                    2023 Telephone   
encounter Note                          Formatting of this note might be   
different from the original.  
Received refill request from fax   
for diltiazem er 180 mg . Patient's   
last ov 23 follow up on 1 year   
recall  
Electronically signed by   
Hilary Cuellar MA at 2023   
9:34 AM EST  
                                        [facility]  
   
                                                    2023 Miscellaneous   
Notes                                   Formatting of this note might be   
different from the original.  
Received refill request from fax   
for diltiazem er 180 mg . Patient's   
last ov 23 follow up on 1 year   
recall  
Electronically signed by   
Hilary Cuellar MA at 2023   
9:34 AM EST  
documented in this encounter            [facility]  
   
                                                    2023 History of   
Present illness Narrative               Formatting of this note is   
different from the original.  
Subjective  
Patient ID: Nick Cloud is a 68   
y.o. male who presents for Rectal   
Bleeding and Hoarseness (xWK).  
  
HPI  
He might see occasional pink stuff   
toilet related to his bowel   
movements. Colonoscopy last year   
was okay except for the   
diverticular disease and to take   
Xarelto. He is not happening more   
often or more frequently or more   
severely. No further work-up needed   
at this time  
  
No obvious medications causing   
voice changes, no obvious allergies   
no obvious illness respiratory.  
He does have heartburn that is well   
controlled if he takes the   
omeprazole 20 mg twice a day.  
  
Exam is benign  
  
Prednisone for 5 days. If it does   
not help, give it another 4 to 6   
weeks unless it gets worse. If not   
better in 4 to 6 weeks, call and we   
will refer to [facility].  
Review of Systems  
Constitutional: Negative for   
chills, fatigue and fever.  
HENT: Positive for congestion and   
voice change. Negative for hearing   
loss, postnasal drip, rhinorrhea,   
sinus pressure, sinus pain,   
sneezing and sore throat.  
Respiratory: Negative for cough,   
chest tightness and shortness of   
breath.  
Cardiovascular: Negative for chest   
pain and palpitations.  
Gastrointestinal: Negative for   
abdominal pain, constipation,   
diarrhea and nausea.  
Genitourinary: Negative for   
difficulty urinating and hematuria.  
Neurological: Negative for   
dizziness and headaches.  
  
Objective  
/62   Pulse 81   Ht 1.803 m   
(5' 11 )   Wt 115 kg (252 lb 8 oz)   
  SpO2 96%   BMI 35.22 kg/m  
  
Physical Exam  
Constitutional:  
Appearance: Normal appearance.  
HENT:  
Head: Normocephalic and atraumatic.  
Mouth/Throat:  
Mouth: Mucous membranes are moist.   
No oral lesions.  
Tongue: No lesions.  
Pharynx: Oropharynx is clear. Uvula   
midline. No posterior oropharyngeal   
erythema.  
Tonsils: No tonsillar exudate.  
Neck:  
Thyroid: No thyromegaly.  
Cardiovascular:  
Rate and Rhythm: Normal rate and   
regular rhythm.  
Heart sounds: Normal heart sounds.  
Pulmonary:  
Effort: Pulmonary effort is normal.  
Breath sounds: Normal breath   
sounds.  
Musculoskeletal:  
Cervical back: Normal range of   
motion and neck supple.  
Lymphadenopathy:  
Cervical: No cervical adenopathy.  
Skin:  
General: Skin is warm and dry.  
Neurological:  
General: No focal deficit present.  
Mental Status: He is alert and   
oriented to person, place, and   
time.  
Psychiatric:  
Mood and Affect: Mood normal.  
Behavior: Behavior normal.  
Thought Content: Thought content   
normal.  
Judgment: Judgment normal.  
  
Assessment/Plan  
Problem List Items Addressed This   
Visit  
  
Change in voice - Primary  
Relevant Medications  
predniSONE (Deltasone) 20 mg tablet  
  
  
Electronically signed by Jason Bonilla MD at 2023 4:17 PM   
EDT  
documented in this encounter            [facility]  
Work Phone:   
[phone]  
   
                                                    2023 History of   
Present illness Narrative               Associated Order(s): Epidural   
Injection  
Post-Procedure Diagnose(s): Spinal   
stenosis of lumbar region with   
neurogenic claudication; Lumbar   
degenerative disc disease  
Formatting of this note is   
different from the original.  
PROCEDURE NOTE  
  
Name: Nick Cloud  
Date of Procedure: [unfilled]  
  
Surgeon:  
Rj Raines MD  
  
Procedure Performed: Caudal   
Epidural Steroid Injection  
Procedure Diagnosis:  
1. Lumbar degenerative disc disease  
  
2. Spinal stenosis of lumbar region   
with neurogenic claudication  
  
  
  
  
  
  
This procedure is being done for   
therapeutic purposes.  
  
INDICATIONS FOR PROCEDURE: The   
patient agrees to the procedure   
after risks and benefits were   
discussed and questions were   
answered. The patient has failed   
other treatment modalities prior to   
the procedure. These modalities   
include, but are not limited to,   
medication trials, physical   
therapy, and other modalities of   
pain management. Subsequent   
procedures are only performed if   
the patient continues to have pain   
or symptomology that warrants the   
procedure.  
  
DESCRIPTION OF PROCEDURE: The   
patient was taken to the   
examination room and informed   
consent was obtained. The patient   
was placed on the table in prone   
position. The patient's skin was   
prepped and draped in standard   
surgical fashion with Betadine.   
Skin was anesthetized using 1%   
preservative free lidocaine. An 22   
gauge spinal needle was inserted in   
caudal space lumbar spine . After   
placement confirmed , a total of   
120 MG of Kenalog was injected   
without any complications. The   
needle was withdrawn intact. The   
patient tolerated the procedure   
well and was subsequently   
discharged in stable condition   
without sequela.  
  
Procedure/Injection verified with   
 Time Out  Yes  
  
Epidural Injection  
  
Performed by: Rj Raines MD  
Authorized by: Rj Raines MD  
Medications: 120 mg triamcinolone   
acetonide 40 mg/mL  
Anesthetic used: Lidocaine 1% PF  
  
  
Electronically signed by Rj Raines MD at 2023   
9:38 AM EDT  
Formatting of this note is   
different from the original.  
 [address]  
[facility]   
MEDICINE  
[address]  
[facility] [address] [phone]  
  
Nick Cloud is a 68 y.o. male   
being seen today, 23,  
No chief complaint on file.  
  
[chief complaint] low back pain  
  
HPI Dictation: Patient seen today   
for his first epidural steroid   
injection with diagnosis of   
degenerative disease spinal   
stenosis  
[hpi]  
  
Physical Exam Dictation:  
[PE] increased pain with lumbar   
range of motion paraspinal spasm  
  
Assessment and Plan Dictation:  
[AP] negative perform a caudal   
epidural steroid injection in   
standard fashion we will see him   
back in 2 weeks if not improved   
otherwise next flareup  
  
I have reviewed all relevant   
histories, medications, allergies,   
and problem list items with Nick Cloud during this visit.  
  
Review of Systems  
  
There were no vitals taken for this   
visit.  
  
Imaging:  
No results found.  
  
1. Lumbar degenerative disc disease  
  
2. Spinal stenosis of lumbar region   
with neurogenic claudication  
  
  
Return in about 2 weeks (around   
2023), or if symptoms worsen   
or fail to improve.  
  
Rj Raines MD  
Electronically signed by Rj Raines MD at 2023   
9:38 AM EDT  
documented in this encounter            [facility]  
   
                                                    2023 History of   
Present illness Narrative               Formatting of this note is   
different from the original.  
 [address]  
[facility]   
MEDICINE  
[address]  
[facility] [id number] [phone]  
  
Nick Cloud is a 68 y.o. male   
being seen today, 23,  
Chief Complaint  
Patient presents with  
Lower Back - Pain  
  
[chief complaint] low back pain   
with pain numbness tingling left   
foot  
  
HPI Dictation: This patient returns   
after EMG which was negative   
regards to the left lower extremity   
with MRI again showing degenerative   
disc disease and mild-moderate   
stenosis L3-4 through L5-S1  
[hpi]  
  
Physical Exam Dictation:  
[PE] he has negative straight leg   
raising no gross motor or sensory   
reflex asymmetry I cannot palpate   
peripheral pulses where he has good   
capillary refill  
  
Assessment and Plan Dictation:  
[AP] plan recommend an epidural   
steroid injection as next treatment   
option  
  
I have reviewed all relevant   
histories, medications, allergies,   
and problem list items with Nick LR   
Cloud during this visit.  
  
Review of Systems  
Constitutional: Negative for chills   
and fever.  
HENT: Negative for congestion.  
Respiratory: Negative for shortness   
of breath.  
Cardiovascular: Negative for chest   
pain.  
Gastrointestinal: Negative for   
diarrhea, nausea and vomiting.  
Neurological: Negative for   
headaches.  
Psychiatric/Behavioral: Negative   
for behavioral problems.  
  
  
Ht 5' 9    BMI 39.43 kg/m  
  
Imaging:  
No results found.  
  
1. Left-sided back pain,   
unspecified back location,   
unspecified chronicity  
  
2. Lumbar degenerative disc disease  
  
3. Spinal stenosis of lumbar region   
with neurogenic claudication  
  
  
Return for postop evaluation after   
scheduled surgery.  
  
Rj Raines MD  
Electronically signed by jR Raines MD at 2023   
2:59 PM EDT  
documented in this encounter            [facility]  
   
                                                    2023 History of   
Present illness Narrative               Formatting of this note is   
different from the original.  
Images from the original note were   
not included.  
  
[facility] -   
Neurology  
[address], [address]nd floor  
[place] [address]  
[phone]  
  
Nerve Conduction & EMG Report  
  
  
Patient: Nick Cloud  
Patient ID: [id number]  
Sex: Male  
YOB: 1955  
  
Visit Date: 2023 8:37 AM  
Age: 68 Years  
Examining MD: Russ Mir MD  
Referred by: Dr. Raines  
Temperature: 31.7  
Current Height: 5 feet 9 inch  
Referred for: Int numbness L foot.   
+ LBP. PreDM.  
  
Plan: The study was design to   
evaluate for entrapment neuropathy,   
polyneuropathy, radiculopathy, or   
plexopathy. Procedure indication,   
risk, complications, side effects   
and alternatives were explained.   
Patient agreed to proceed with   
verbal consent. Patient was   
instructed to clean the puncture   
site with soap and water and put   
some ice pack for bruising.  
  
Impression: There is NO clear   
electrodiagnostic evidence of a   
left lumbosacral radiculopathy,   
plexopathy, or diffuse sensorimotor   
polyneuropathy at this time.  
  
EMG Summary: The left peroneal and   
tibial motor nerve conduction   
studies were normal. The left sural   
and superficial peroneal sensory   
nerve conduction studies were also   
normal. The left H reflex was   
normal.  
  
Needle EMG of the muscle tested   
showed no abnormal spontaneous   
activity. Normal motor unit action   
potentials and recruitment patterns   
were seen.  
  
Russ Mir MD  
Diplomate, ABPN, NBPAS  
Clinical Neurophysiology,   
Neurology, Vascular Neurology and   
Sleep Medicine  
[facility] [place], [place], [place]  
[phone]  
  
  
  
Motor NCS  
  
Nerve / Sites Muscle Latency   
Amplitude Distance Velocity  
ms mV cm m/s  
L Deep peroneal (Fibular) - EDB  
Ankle EDB 4.33 2.9 8.5  
Fib Head EDB 12.04 2.6 35 45.4  
Knee EDB 13.65 2.5 7 43.6  
L Tibial - AH  
Ankle AH 4.94 4.3 8  
Knee AH 14.40 3.3 40 42.3  
  
Sensory NCS  
  
Nerve / Sites Peak Amp Amp.2-3   
Distance Velocity  
ms V V cm m/s  
L Sural - Lat Mall  
Calf 4.42 13.7 1.1 14 46  
L Superficial peroneal - Ankle  
Lat leg 4.31 11.6 5.6 14 42  
  
H Reflex  
  
Nerve H Lat  
ms  
L Tibial - Soleus 32.97  
  
EMG Summary Table  
Spontaneous Activity Amplitude   
Duration Recruitment Polyphasia   
Comment  
Muscle Ins Act Fib PSW Fasc - - - -   
-  
L. Vastus lateralis Normal 0 0 0   
Normal Normal Normal Normal Normal  
L. Semitendinosus Normal 0 0 0   
Normal Normal Normal Normal Normal  
L. Tibialis anterior Normal 0 0 0   
Normal Normal Normal Normal Normal  
L. Gastrocnemius (Medial head)   
Normal 0 0 0 Normal Normal Normal   
Normal Normal  
L. Abductor hallucis Normal 0 0 0   
Normal Normal Normal Normal Normal  
L. Lumbar paraspinals Normal 0 0 0   
Normal Normal Normal Normal Normal  
  
  
Electronically signed by Russ Mir MD at 2023 8:38   
AM EDT  
documented in this encounter            [facility]  
   
                                                    2023 History of   
Present illness Narrative               Formatting of this note is   
different from the original.  
Subjective  
Patient ID: Nick Cloud is a 68   
y.o. male who presents for Annual   
Exam ([facility]).  
  
HPI  
Hypertension - Takes medication   
without side effects. No   
CP/SOB/Palpitations. Follows a no   
added salt diet. Counseled diet,   
activity and weight loss.  
Diabetes Type II - Takes and   
tolerates medications. No   
hypoglycemia. Counseled on diet   
with low carbohydrate intake,   
weight loss and increased activity   
levels/exercise.  
Hyperlipidemia - Takes and   
tolerates medications without   
fatigue and myalgias.  
GERD - Takes PPI daily with no   
breakthrough symptoms. Reviewed   
dietary, caffeine, tobacco,   
alcohol, and NSAID avoidance.  
Afib stable  
  
Prevnar 20 today  
Rec shingles hot  
Rec year;ty flu and covid shots  
Review of Systems  
Constitutional: Negative for   
fatigue.  
Eyes: Negative for visual   
disturbance.  
Respiratory: Negative for cough,   
chest tightness and shortness of   
breath.  
Cardiovascular: Negative for chest   
pain and palpitations.  
Gastrointestinal: Negative for   
abdominal pain, constipation and   
diarrhea.  
Skin: Negative for rash.  
Neurological: Negative for   
headaches.  
  
Objective  
/78   Pulse 64   Ht 1.803 m   
(5' 11 )   Wt 119 kg (262 lb 1.6   
oz)   SpO2 96%   BMI 36.56 kg/m  
  
Physical Exam  
Constitutional:  
Appearance: Normal appearance.  
HENT:  
Head: Normocephalic and atraumatic.  
Cardiovascular:  
Rate and Rhythm: Normal rate and   
regular rhythm.  
Heart sounds: Normal heart sounds.  
Pulmonary:  
Effort: Pulmonary effort is normal.  
Breath sounds: Normal breath   
sounds.  
Skin:  
General: Skin is warm and dry.  
Neurological:  
General: No focal deficit present.  
Mental Status: He is alert and   
oriented to person, place, and   
time.  
Gait: Gait normal.  
Psychiatric:  
Mood and Affect: Mood normal.  
Behavior: Behavior normal.  
Thought Content: Thought content   
normal.  
Judgment: Judgment normal.  
  
Assessment/Plan  
Problem List Items Addressed This   
Visit  
  
Controlled type 2 diabetes mellitus   
without complication, without   
long-term current use of insulin   
(CMS/[facility]) - Primary  
Relevant Orders  
CBC  
Comprehensive Metabolic Panel  
Hemoglobin A1C  
Hypertension, essential  
Relevant Orders  
CBC  
Comprehensive Metabolic Panel  
  
  
Electronically signed by Jason Bonilla MD at 2023 4:43 PM   
EDT  
documented in this encounter            [facility]  
Work Phone:   
[phone]  
   
                                                    2023 Telephone   
encounter Note                          Formatting of this note might be   
different from the original.  
Received request for atorvastatin   
(LIPITOR) 40 MG tablet. Last appt   
was 23. Follow up is due in 1   
year.Thanks!  
.  
  
Electronically signed by Lakesha Burt MA at 2023 9:56 AM EDT  
                                        [facility]  
   
                                                    2023 History of   
Present illness Narrative               Formatting of this note is   
different from the original.  
 [address]  
[facility]   
MEDICINE  
[address]  
[facility] [id number] [phone]  
  
Nick Cloud is a 68 y.o. male   
being seen today, 23,  
Chief Complaint  
Patient presents with  
Lower Back - Pain, Follow-up  
MRI RESULTS  
  
[chief complaint] pain numbness   
tingling distal aspect of the left   
leg and foot  
  
HPI Dictation: Returns after lumbar   
MRI which does show evidence of   
disc degeneration and mild/moderate   
central stenosis at 3445 mild to   
moderate deforming the thecal sac   
and mild encroachment on nerve   
roots mild central stenosis at   
L5-S1 causing mild encroachment   
bilateral nerve roots nothing   
really explains his current   
symptoms which is really numbness   
tingling and pain with prolonged   
standing from the distal area of   
his left leg into the foot and   
ankle  
[hpi]  
  
Physical Exam Dictation:  
[PE] there is no point tenderness   
over the forementioned areas  
  
Assessment and Plan Dictation:  
[AP] plan I am recommending EMG to   
exclude the possibility of   
neuropathy with recommendations to   
follow  
  
I have reviewed all relevant   
histories, medications, allergies,   
and problem list items with Nick Cloud during this visit.  
  
Review of Systems  
Constitutional: Negative for chills   
and fever.  
HENT: Negative for congestion.  
Respiratory: Negative for shortness   
of breath.  
Cardiovascular: Negative for chest   
pain.  
Gastrointestinal: Negative for   
diarrhea, nausea and vomiting.  
Neurological: Negative for   
headaches.  
Psychiatric/Behavioral: Negative   
for behavioral problems.  
  
  
Ht 5' 9    Wt 121.1 kg (267 lb)     
BMI 39.43 kg/m  
  
Imaging:  
No results found.  
  
1. Lumbar degenerative disc disease  
  
  
Return in about 4 weeks (around   
2023).  
  
Rj Raines MD  
Electronically signed by Rj Raines MD at 2023   
3:49 PM EDT  
documented in this encounter            [facility]  
   
                                                    2023 History of   
Present illness Narrative               Formatting of this note might be   
different from the original.  
Back pain  
  
Patient is a pleasant 68-year-old   
male following up today on his back   
x-rays for continued back pain and   
left-sided radicular pain. Still   
states that he is getting numbness   
after standing for a couple minutes   
covered by sitting.  
  
Radiographs reviewed: The official   
read is still pending.  
-Does have anterior joint space   
loss and enthesophytes present   
through his lumbar. No obvious   
fracturing seen by me.  
  
Assessment and plan:  
Patient is a pleasant 68-year-old   
male with likely left lower   
extremity radicular back pain and   
radiculopathy.  
-Did add a consult with orthopedic   
surgery including Dr. Raines to   
assist with work-up and treatment.  
Can follow-up with me on an   
as-needed basis.  
Electronically signed by Kenton Hinojosa Jr., DPM at 2023   
4:29 PM EDT  
documented in this encounter            [facility]  
   
                                                    2023 History of   
Present illness Narrative               Formatting of this note is   
different from the original.  
HPI  
Chief Complaint  
Patient presents with  
Follow-up  
Left ankle foot pain when he stands   
for about 10 seconds it feels like   
its going to sleep  
  
Patient is a pleasant 68-year-old   
male comes in today with left foot   
numbness and pain. Patient states   
that overall his foot is really not   
that painful however after standing   
up for about 10 minutes he gets   
full foot numbness after sitting   
down for 2 minutes usually if the   
numbness is gone improved that he   
is back to normal for 10 more   
minutes. Comes in today to have   
this evaluated. No trauma.  
  
Past Medical History:  
Diagnosis Date  
Atrial flutter (HCC)  
Benign essential hypertension  
CAD (coronary artery disease)  
Diabetes mellitus (HCC)  
GERD (gastroesophageal reflux   
disease)  
Hyperlipidemia  
Myocardial infarction (HCC)   
2014  
acute anteroseptal  
  
Past Surgical History:  
Procedure Laterality Date  
APPENDECTOMY  
CARDIAC CATHETERIZATION Left   
2014  
EF 40% with abnormal wall motion  
CARDIOVERSION 2016  
Successful cardioversion to normal   
sinus rhythm.  
EP STUDY N/A 3/7/2017  
Procedure: EP Study Possible Basic   
Ablation, 2 hours , for typical   
atrial flutter, with cardiac   
anesthesia, do not need ELVIN, needs   
with ROBERTO and may cancel Roberto if pt   
presents in sinus rhythm the day of   
procedure; Surgeon: Sharif Velázquez MD; Location: [facility] [place]; Service:  
TONSILLECTOMY AND ADENOIDECTOMY  
  
Social History  
  
Socioeconomic History  
Marital status:   
Tobacco Use  
Smoking status: Former  
Packs/day: 1.00  
Years: 35.00  
Pack years: 35.00  
Types: Cigarettes  
Quit date: 2008  
Years since quittin.4  
Smokeless tobacco: Never  
Vaping Use  
Vaping Use: Never used  
Substance and Sexual Activity  
Alcohol use: Yes  
Alcohol/week: 7.0 standard drinks   
of alcohol  
Types: 7 Glasses of wine per week  
Drug use: No  
  
Review of Systems  
  
Physical Exam  
Patient is AOx3. Linear and   
appropriate humor and thought   
process.  
Vascular: DP PT pulses are easily   
palpable 2 out of 4. CFT is normal   
minimal edema.  
Derm: No open wounds no ulcers no   
rashes no deep nodules.  
Neuro: Light touch while sitting is   
within normal limits generally some   
hyper ticklish sensation.  
Musculoskeletal: Mild bunion   
deformity present otherwise ankle   
subtalar midtarsal are full and   
pain-free.  
Impression/Plan  
Problem List Items Addressed This   
Visit  
None  
Visit Diagnoses  
  
Left foot pain - Primary  
Relevant Orders  
XR Foot Left 3+ Views (Standard)  
Left-sided back pain, unspecified   
back location, unspecified   
chronicity  
Relevant Orders  
XR Lumbar Spine 2-3 Views   
(Standard)  
  
  
  
Patient is a pleasant 68-year-old   
male with concern for radiculopathy   
left lower extremity lumbosacral in   
nature.  
-Today did order back radiographs   
to make pain back x-rays   
potentially we will get consult   
with Dr. Raines to assist.  
Follow-up on imaging.  
  
Electronically signed by Kenton Hinojosa Jr., DPM at 2023 4:57   
PM EDT  
documented in this encounter            [facility]  
   
                                                    2023 Telephone   
encounter Note                          Formatting of this note might be   
different from the original.  
Surescript request  
  
Sildenafil 50 mg one tablet daily   
as needed for erectile dysfuntion   
Dispense #10 with 11 refills  
  
[facility]  
  
Recall on file for 24.  
Electronically signed by Elizabeth Li MA at 2023 2:11   
PM EDT  
                                        [facility]  
   
                                                    2023 Miscellaneous   
Notes                                   Formatting of this note might be   
different from the original.  
Surescript request  
  
Sildenafil 50 mg one tablet daily   
as needed for erectile dysfuntion   
Dispense #10 with 11 refills  
  
[facility]  
  
Recall on file for 24.  
Electronically signed by Elizabeth Li MA at 2023 2:11   
PM EDT  
documented in this encounter            [facility]  
   
                                                    2023 Miscellaneous   
Notes                                   Formatting of this note might be   
different from the original.  
Received request for atorvastatin   
(LIPITOR) 40 MG tablet. Last appt   
was 23. Follow up is due in 1   
year.Thanks!  
.  
  
Electronically signed by Lakesha Burt MA at 2023 9:56 AM EDT  
documented in this encounter            [facility]  
   
                                                    2023 Telephone   
encounter Note                          Formatting of this note might be   
different from the original.  
Surescript request  
  
Xarelto 20 mg one tablet daily  
Dispense #90 with 3 refills  
  
[facility]  
  
Recall on file for 24.  
Electronically signed by Elizabeth Li MA at 2023 1:50   
PM EDT  
                                        [facility]  
   
                                                    2023 Miscellaneous   
Notes                                   Formatting of this note might be   
different from the original.  
Surescript request  
  
Xarelto 20 mg one tablet daily  
Dispense #90 with 3 refills  
  
[facility]  
  
Recall on file for 24.  
Electronically signed by Elizabeth Li MA at 2023 1:50   
PM EDT  
documented in this encounter            [facility]  
   
                                                    2023 Telephone   
encounter Note                          Formatting of this note might be   
different from the original.  
Received refill request for   
lisinopril 20 mg every day 90/3.   
Pt's last OV was 23. Follow up   
is due yearly thanks.  
  
Electronically signed by Alvin Moeller MA at 2023   
11:18 AM EST  
                                        [facility]  
   
                                                    2023 Miscellaneous   
Notes                                   Formatting of this note might be   
different from the original.  
Received refill request for   
lisinopril 20 mg every day 90/3.   
Pt's last OV was 23. Follow up   
is due yearly thanks.  
  
Electronically signed by Alvin Moeller MA at 2023   
11:18 AM EST  
documented in this encounter            [facility]  
   
                                                    2023 History of   
Present illness Narrative               Formatting of this note is   
different from the original.  
  
Patient Name: Nick Cloud   
[facility]   
Physicians  
MR #: [id number]  
Acct #: [id number]  
  
Interventional Cardiology  
Mundo Cortez MD, [facility]   
Physicians  
  
23  
  
Dear Jason Bonilla MD,  
  
Nick Cloud was seen in follow up   
for :  
  
Problem  
Anaya (Dyspnea On Exertion)  
Atrial Fibrillation (Hcc)  
2016-CV at that time--typical  
Xarelto-stopped 1month after rfa  
2017 RFA-  
Chronic and xarelto restarted  
  
Cad (Coronary Artery Disease)  
 , occluded lad with   
collaterals.-EECP 40% ef  
2020-stress old infarct  
  
  
Assessment and Plan  
Atrial fibrillation (HCC)  
Doing well, Medications reviewed   
and recommended to continue   
xarelto,toprol  
Followup 1 year  
  
CAD (coronary artery disease)  
No angina, continue asa,   
atorvastatin  
  
ANAYA (dyspnea on exertion)  
Discussed in detail and may be   
deconditioning, or decrease ef or   
afib combo,  
He is on a litlle lasix /K and will   
check labs  
  
Thank you or allowing me to   
participate in the care of your   
patients.  
  
Orders Placed This Encounter  
Procedures  
Basic metabolic panel  
Lipid panel  
  
Return in about 1 year (around   
2024).  
  
EKG:not done  
  
Subjective:  
Nick Cloud is a 67 y.o. y/o male :   
discussed dyspnea on exertion,Afib  
Denies chest discomfort, sob,   
palpitations, pnd, orthopnea,edema   
or syncope.  
  
Past History and Exam  
  
PMH:  
Past Medical History:  
Diagnosis Date  
Atrial flutter (HCC)  
Benign essential hypertension  
CAD (coronary artery disease)  
Diabetes mellitus (HCC)  
GERD (gastroesophageal reflux   
disease)  
Hyperlipidemia  
Myocardial infarction (HCC)   
2014  
acute anteroseptal  
  
Physical exam:  
/76   Pulse 78   Ht 5' 9      
Wt 121.1 kg (267 lb)   BMI 39.43   
kg/m  
  
General: No acute distress, alert,   
and oriented x3.  
Cardiovascular: regular rate and   
rhythm. no murmurs, .No JVD, No   
Carotid Bruits, no LE edema  
:Respiratory: Clear to auscultation   
bilaterally without wheezes,   
rhonchi, or rales  
Abdominal: soft, nontender,   
nondistended,no gross HSM or masses   
noted.  
  
Home Medications:  
  
Patient's Medications  
New Prescriptions  
No medications on file  
Previous Medications  
ASPIRIN 81 MG EC TABLET Take 1   
(one) tablet (81 mg total) by mouth   
daily .  
ATORVASTATIN (LIPITOR) 40 MG TABLET   
TAKE ONE TABLET BY MOUTH DAILY  
CHOLECALCIFEROL, VITAMIN D3, 10 MCG   
(400 UNIT) CHEW Chew and Swallow .  
DILTIAZEM (CARDIZEM CD) 180 MG 24   
HR CAPSULE Take 1 (one) capsule   
(180 mg total) by mouth daily .  
FUROSEMIDE (LASIX) 40 MG TABLET   
Take 1 (one) tablet (40 mg total)   
by mouth daily .  
LISINOPRIL (PRINIVIL,ZESTRIL) 20 MG   
TABLET TAKE ONE TABLET BY MOUTH   
DAILY  
METFORMIN (GLUCOPHAGE) 500 MG   
TABLET Take 1 Unspecified by mouth   
every 12 (twelve) hours .  
METOPROLOL TARTRATE (LOPRESSOR) 50   
MG TABLET TAKE ONE TABLET BY MOUTH   
TWICE A DAY  
MULTIVITAMIN PER TABLET Take 1   
(one) tablet by mouth daily .  
POTASSIUM CHLORIDE SA   
(K-DUR,KLOR-CON) 20 MEQ TABLET Take   
1 (one) tablet (20 mEq total) by   
mouth daily .  
XARELTO 20 MG TAB TAKE 1 TABLET   
DAILY  
Modified Medications  
Modified Medication Previous   
Medication  
NITROGLYCERIN (NITROSTAT) 0.4 MG SL   
TABLET nitroGLYCERIN (NITROSTAT)   
0.4 MG SL tablet  
Place 1 (one) tablet (0.4 mg total)   
under the tongue every 5 (five)   
minutes as needed for chest pain .   
Place 1 (one) tablet (0.4 mg total)   
under the tongue every 5 (five)   
minutes as needed for chest pain .  
SILDENAFIL (VIAGRA) 50 MG TABLET   
sildenafiL (VIAGRA) 50 MG tablet  
Take 1 (one) tablet (50 mg total)   
by mouth daily as needed for   
erectile dysfunction . Take 1   
Unspecified by mouth .  
Discontinued Medications  
No medications on file  
  
Electronically signed by Mundo Cortez MD at 2023 3:08 PM   
EST  
documented in this encounter            [facility]  
   
                                                    2023 Evaluation +   
Plan note                               Associated Problem(s): ANAYA (dyspnea   
on exertion)  
Formatting of this note might be   
different from the original.  
Discussed in detail and may be   
deconditioning, or decrease ef or   
afib combo,  
He is on a litlle lasix /K and will   
check labs  
Electronically signed by Mundo Cortez MD at 2023 2:48 PM   
EST  
                                        [facility]  
   
                                                    2023 Miscellaneous   
Notes                                   Associated Problem(s): ANAYA (dyspnea   
on exertion)  
Formatting of this note might be   
different from the original.  
Discussed in detail and may be   
deconditioning, or decrease ef or   
afib combo,  
He is on a litlle lasix /K and will   
check labs  
Electronically signed by Mundo Cortez MD at 2023 2:48 PM   
EST  
Associated Problem(s): CAD   
(coronary artery disease)  
Formatting of this note might be   
different from the original.  
No angina, continue asa,   
atorvastatin  
Electronically signed by Mundo Cortez MD at 2023 2:42 PM   
EST  
Associated Problem(s): Atrial   
fibrillation (HCC)  
Formatting of this note might be   
different from the original.  
Doing well, Medications reviewed   
and recommended to continue   
xarelto,toprol  
Followup 1 year  
  
Electronically signed by Mundo Cortez MD at 2023 2:39 PM   
EST  
documented in this encounter            [facility]  
   
                                                    2023 Evaluation +   
Plan note                               Associated Problem(s): CAD   
(coronary artery disease)  
Formatting of this note might be   
different from the original.  
No angina, continue asa,   
atorvastatin  
Electronically signed by Mundo Cortez MD at 2023 2:42 PM   
EST  
                                        [facility]  
   
                                                    2023 Evaluation +   
Plan note                               Associated Problem(s): Atrial   
fibrillation (HCC)  
Formatting of this note might be   
different from the original.  
Doing well, Medications reviewed   
and recommended to continue   
xarelto,toprol  
Followup 1 year  
  
Electronically signed by Mundo Cortez MD at 2023 2:39 PM   
EST  
                                        [facility]  
   
                                                    2022 Telephone   
encounter Note                          Formatting of this note might be   
different from the original.  
Received refill request for   
metoprolol tart 50 mg bid 180/0.   
Pt's last OV was 21. Follow up   
is due yearly thanks.  
  
Electronically signed by Alvin Moeller MA at 2022   
1:36 PM EST  
                                        [facility]  
   
                                                    2022 Miscellaneous   
Notes                                   Formatting of this note might be   
different from the original.  
Received refill request for   
metoprolol tart 50 mg bid 180/0.   
Pt's last OV was 21. Follow up   
is due yearly thanks.  
  
Electronically signed by Alvin Moeller MA at 2022   
1:36 PM EST  
documented in this encounter            [facility]  
   
                                                    2022 Telephone   
encounter Note                          Formatting of this note might be   
different from the original.  
Duplicate filled on 22  
Electronically signed by Elizabeth Li MA at 2022 8:04   
AM EST  
                                        [facility]  
   
                                                    2022 Miscellaneous   
Notes                                   Formatting of this note might be   
different from the original.  
Duplicate filled on 22  
Electronically signed by Elizabeth Li MA at 2022 8:04   
AM EST  
documented in this encounter            [facility]  
   
                                                    2022 History of   
Present illness Narrative               Formatting of this note might be   
different from the original.  
EKG Performed. Please review  
Electronically signed by Lyn Condon MA at 2022 10:11   
AM EDT  
documented in this encounter            [facility]  
   
                                                    2022 Telephone   
encounter Note                          Formatting of this note might be   
different from the original.  
Patient agreeable to all testing,   
placing orders.  
  
Beata GUTIERREZ he is on vacation 6/10 to   
. See below, I placed a note in   
with the EKG that he needs BP done   
at same time. Please try to   
coordinate EKG and echo together.  
  
Sending prescriptions in to [facility].  
Electronically signed by Faviola Conner RN at 2022 12:43 PM   
EDT  
                                        [facility]  
   
                                                    2022 Miscellaneous   
Notes                                   Formatting of this note might be   
different from the original.  
Patient agreeable to all testing,   
placing orders.  
  
Beata GUTIERREZ he is on vacation 6/10 to   
. See below, I placed a note in   
with the EKG that he needs BP done   
at same time. Please try to   
coordinate EKG and echo together.  
  
Sending prescriptions in to [facility]   
[facility].  
Electronically signed by Faviola Conner RN at 2022 12:43 PM   
EDT  
Formatting of this note might be   
different from the original.  
No, he lives in [place]  
  
2 D cardiac echocardiogram (have   
them also get Electrocardiogram and   
BP/ at that time  
  
Lasix 40 mg po daily with 20 KCL   
daily a  
  
Chem 7 in 1 week  
Will reassess and if needs visit   
should be with me  
Electronically signed by Mundo Cortez MD at 2022 11:23   
AM EDT  
Formatting of this note might be   
different from the original.  
Patient was directed to see   
cardiologist by PCP due to   
symptoms. Patient reports swelling   
in feet and ankles, increased   
shortness of breath. NP visit?  
Electronically signed by Faviola Conner RN at 2022 10:53 AM   
EDT  
documented in this encounter            [facility]  
   
                                                    2022 Telephone   
encounter Note                          Formatting of this note might be   
different from the original.  
No, he lives in [place]  
  
2 D cardiac echocardiogram (have   
them also get Electrocardiogram and   
BP/ at that time  
  
Lasix 40 mg po daily with 20 KCL   
daily a  
  
Chem 7 in 1 week  
Will reassess and if needs visit   
should be with me  
Electronically signed by Mundo Cortez MD at 2022 11:23   
AM EDT  
                                        [facility]  
   
                                                    2022 Telephone   
encounter Note                          Formatting of this note might be   
different from the original.  
Patient was directed to see   
cardiologist by PCP due to   
symptoms. Patient reports swelling   
in feet and ankles, increased   
shortness of breath. NP visit?  
Electronically signed by Faviola Conner RN at 2022 10:53 AM   
EDT  
                                        [facility]  
   
                                                    2022 Telephone   
encounter Note                          Formatting of this note might be   
different from the original.  
Received refill request for xarelto   
20 mg every day /3. Pt's last OV   
was 21. Follow up is due   
*yearly thanks**.  
  
Electronically signed by Alvin Moeller MA at 2022   
3:28 PM EDT  
                                        [facility]  
   
                                                    2022 Miscellaneous   
Notes                                   Formatting of this note might be   
different from the original.  
Received refill request for xarelto   
20 mg every day 90/3. Pt's last OV   
was 21. Follow up is due   
*yearly thanks**.  
  
Electronically signed by Alvin Moeller MA at 2022   
3:28 PM EDT  
documented in this encounter            [facility]  
   
                                                    2021 Miscellaneous   
Notes                                     
  
  
Formatting of this note might be   
different from the original.  
Received refill request for   
metoprolol tart 50 mg bid 180/3.   
Pt's last OV was 21. Follow up   
is due yearly thanks.  
  
  
  
Electronically signed by Alvin Moeller MA at 2021   
9:52 AM ESTdocumented in this   
encounter                               [facility]  
   
                                                    2021 History of   
Present illness Narrative                 
  
  
Formatting of this note is   
different from the original.  
  
Patient Name: Nick Cloud  
MR #: [id number]  
Acct #: [id number]  
  
Interventional Cardiology  
Mundo Cortez MD, [facility]   
[facility]  
  
21  
  
Dear Jason Bonilla MD,  
  
Nick Cloud was seen in follow up   
for :  
  
Problem  
Anaya (Dyspnea On Exertion)  
Atrial Fibrillation (Hcc)  
2016-CV at that time--typical  
Xarelto-stopped 1month after rfa  
2017 RFA-  
  
Cad (Coronary Artery Disease)  
 , occluded lad with   
collaterals.-EECP 40% ef  
2020-stress old infarct  
  
  
  
Assessment and Plan  
CAD (coronary artery disease)  
No angina, Doing well, Medications   
reviewed and recommended to   
continue asa  
Followup 1 year  
  
ANAYA (dyspnea on exertion)  
Notices more dyspnea on exertion   
with long walks/grade when talking   
to him I thought it could either be   
his heart rate not being   
controlled, deconditioning and last   
but not least possibly ischemia.  
I did review his stress test from   
last December his heart rate was   
154. He says he thinks his heart   
rate goes like that with a lot less   
activity.  
  
Atrial fibrillation (HCC)  
In chronic Atrial Fibrillation and   
dyspnea on exertion may be causal,   
will check HR response to activity  
Will add diltiazem cd 180 for hr   
control and BP  
  
Thank you or allowing me to   
participate in the care of your   
patients.  
No orders of the defined types were   
placed in this encounter.  
  
Return in about 1 year (around   
2022).  
  
EKG:Atrial Fibrillation with   
controlled rate.  
  
Subjective:  
Nick Cloud is a 66 y.o. y/o male :   
Discussed dyspnea on exertion,   
afib,BP  
Denies chest discomfort,   
palpitations, pnd, orthopnea,edema   
or syncope.  
Past History and Exam  
PMH:  
  
Past Medical History:  
Diagnosis Date  
Atrial flutter (HCC)  
Benign essential hypertension  
CAD (coronary artery disease)  
Diabetes mellitus (HCC)  
GERD (gastroesophageal reflux   
disease)  
Hyperlipidemia  
Myocardial infarction (HCC)   
2014  
acute anteroseptal  
  
Physical exam:  
BP (!) 169/107   Pulse 90   Ht 5'   
9    Wt 117.9 kg (260 lb)   SpO2   
98%   BMI 38.40 kg/m  
  
General: No acute distress, alert,   
and oriented x3.  
HEENT: Normocephalic,  
Neck: Supple, no gross   
thyromegaly,no palpable neck   
adenopathy  
Cardiovascular: irregular rate and   
rhythm. no murmurs, .No JVD, No   
Carotid Bruits, no LE edema  
:Respiratory: Clear to auscultation   
bilaterally without wheezes,   
rhonchi, or rales  
Abdominal: soft, nontender,   
nondistended,no gross HSM or masses   
noted.  
  
Home Medications:  
  
Patient's Medications  
New Prescriptions  
DILTIAZEM (CARDIZEM CD) 180 MG 24   
HR CAPSULE Take 1 (one) capsule   
(180 mg total) by mouth daily .  
Previous Medications  
ASPIRIN 81 MG EC TABLET Take 81 mg   
by mouth daily.  
ATORVASTATIN (LIPITOR) 40 MG TABLET   
TAKE ONE TABLET BY MOUTH DAILY  
CHOLECALCIFEROL, VITAMIN D3,   
(VITAMIN D3) 10 MCG (400 UNIT) CHEW   
Chew and Swallow .  
LISINOPRIL (PRINIVIL,ZESTRIL) 20 MG   
TABLET Take 1 (one) tablet (20 mg   
total) by mouth daily .  
METOPROLOL TARTRATE (LOPRESSOR) 50   
MG TABLET Take 1 (one) tablet (50   
mg total) by mouth 2 (two) times a   
day .  
NITROGLYCERIN (NITROSTAT) 0.4 MG SL   
TABLET Place 1 (one) tablet (0.4 mg   
total) under the tongue every 5   
(five) minutes as needed for chest   
pain .  
RIVAROXABAN (XARELTO) 20 MG TAB   
Take 1 (one) tablet (20 mg total)   
by mouth daily .  
SILDENAFIL (VIAGRA) 50 MG TABLET   
Take 1 (one) tablet (50 mg total)   
by mouth as needed for erectile   
dysfunction .  
Modified Medications  
No medications on file  
Discontinued Medications  
No medications on file  
  
  
  
Electronically signed by Mundo Cortez MD at 2021 3:01 PM   
ESTdocumented in this encounter         [facility]  
   
                                                    2021 Miscellaneous   
Notes                                     
  
  
Associated Problem(s): Atrial   
fibrillation (HCC)  
  
  
Formatting of this note might be   
different from the original.  
In chronic Atrial Fibrillation and   
dyspnea on exertion may be causal,   
will check HR response to activity  
Will add diltiazem cd 180 for hr   
control and BP  
  
  
Electronically signed by Mundo Cortez MD at 2021 2:35 PM   
EST  
  
  
Associated Problem(s): ANAYA (dyspnea   
on exertion)  
  
  
Formatting of this note might be   
different from the original.  
Notices more dyspnea on exertion   
with long walks/grade when talking   
to him I thought it could either be   
his heart rate not being   
controlled, deconditioning and last   
but not least possibly ischemia.  
I did review his stress test from   
last December his heart rate was   
154. He says he thinks his heart   
rate goes like that with a lot less   
activity.  
  
  
Electronically signed by Mundo Cortez MD at 2021 2:57 PM   
EST  
  
  
Associated Problem(s): CAD   
(coronary artery disease)  
  
  
Formatting of this note might be   
different from the original.  
No angina, Doing well, Medications   
reviewed and recommended to   
continue asa  
Followup 1 year  
  
  
  
Electronically signed by Mundo Cortez MD at 2021 2:29 PM   
ESTdocumented in this encounter         [facility]  
   
                                                    2021 Miscellaneous   
Notes                                     
  
  
Formatting of this note might be   
different from the original.  
Pt needs a refill of atorvastatin   
(LIPITOR) 40 MG tablet His last   
appt was 2020 and his next   
appt is 2021 Different   
pharmacy  
  
  
Electronically signed by Salina Garcia, TECHNOLOGIST at   
2021 10:05 AM EDTdocumented   
in this encounter                       [facility]  
   
                                                    10- Miscellaneous   
Notes                                     
  
  
Formatting of this note might be   
different from the original.  
Incoming fax requesting a refill.   
Last OV was 2020. Mesg sent   
to  for his yearly due in   
December.  
  
  
Electronically signed by Maira Berumen MA at 10/25/2021   
8:28 AM EDTdocumented in this   
encounter                               [facility]  
   
                                                    2014 History of   
Present illness Narrative               Patient presents to the office to   
with Gross Hematuria. Patient   
states this has been going on for 6   
days. PSA. 0.9 (3/19)...Prior PSA   
was 1.00 on 3/2014....No fhx of   
prostate ca....Never had a bx of   
the prostate...BPH sx are mild and   
stable...Some urgency and   
frequency....Denies   
dysuria......Nocturia x1-2...Pt.   
does not take any medication for   
the prostate..Pt. has chronic hx of   
kidney stones....Patient states he   
has past 3 kidney stones since Aug   
2020...Denies flank pain...Denies   
N/V and F/C....ED is mild..             [id number]  
Work Phone:   
[phone]  
  
  
  
                                                    Evaluation note   
  
  
  
                                                    Diagnosis  
   
                                                      
  
  
Chronic atrial fibrillation (HCC)  
  
  
Atrial fibrillation  
  
documented in this encounter  
[facility]   
  
                                                    Diagnosis  
   
                                                      
  
  
Typical atrial flutter (HCC)- Primary  
  
documented in this encounter  
[facility]   
  
                                                    Diagnosis  
   
                                                      
  
  
Typical atrial flutter (HCC)  
   
                                                      
  
  
Coronary artery disease involving native coronary artery of native heart without
   
angina pectoris  
   
                                                      
  
  
ANAYA (dyspnea on exertion)  
  
  
Other dyspnea and respiratory abnormality  
   
                                                      
  
  
Permanent atrial fibrillation (HCC)  
  
  
Atrial fibrillation  
  
documented in this encounter  
[facility]   
  
                                                    Diagnosis  
   
                                                      
  
  
Chronic atrial fibrillation (HCC)  
  
  
Atrial fibrillation  
  
documented in this encounter  
[facility]   
  
                                                    Diagnosis  
   
                                                      
  
  
ANAYA (dyspnea on exertion)- Primary  
  
  
Other dyspnea and respiratory abnormality  
  
documented in this encounter  
[facility]   
  
                                                    Diagnosis  
   
                                                      
  
  
ANAYA (dyspnea on exertion)  
  
  
Other dyspnea and respiratory abnormality  
  
documented in this encounter  
[facility]   
  
                                                    Diagnosis  
   
                                                      
  
  
Permanent atrial fibrillation (HCC)  
  
  
Atrial fibrillation  
   
                                                      
  
  
Coronary artery disease involving native coronary artery of native heart without
   
angina pectoris  
   
                                                      
  
  
ANAYA (dyspnea on exertion)  
  
  
Other dyspnea and respiratory abnormality  
  
documented in this encounter  
[facility]   
  
                                                    Diagnosis  
   
                                                      
  
  
Typical atrial flutter (HCC)  
  
documented in this encounter  
[facility]   
  
                                                    Diagnosis  
   
                                                      
  
  
Benign essential hypertension- Primary  
  
  
Essential hypertension, benign  
  
documented in this encounter  
[facility]   
  
                                                    Diagnosis  
   
                                                      
  
  
Chronic atrial fibrillation (HCC)  
  
  
Atrial fibrillation  
  
[facility]  
[facility]   
  
                                                    [facility]  
   
                                                      
  
  
Left foot pain- Primary  
  
  
Pain in soft tissues of limb  
   
                                                      
  
  
Left-sided back pain, unspecified back location, unspecified chronicity  
  
[facility] this encounter  
[facility]   
  
                                                    Diagnosis  
   
                                                      
  
  
Left-sided back pain, unspecified back location, unspecified chronicity- Primary  
  
documented in this encounter  
[facility]   
  
                                                    Diagnosis  
   
                                                      
  
  
Lumbar degenerative disc disease- Primary  
   
                                                      
  
  
Left-sided back pain, unspecified back location, unspecified chronicity  
  
[facility] this encounter  
[facility]   
  
                                                    Diagnosis  
   
                                                      
  
  
Lumbar degenerative disc disease- Primary  
  
documented in this encounter  
[facility]   
  
                                                    Diagnosis  
   
                                                      
  
  
Chronic atrial fibrillation (HCC)  
  
  
Atrial fibrillation  
  
[facility] in this encounter  
[facility]   
  
                                                    Diagnosis  
   
                                                      
  
  
Controlled type 2 diabetes mellitus without complication, without long-term 
current   
use of insulin (CMS/HCC)- Primary  
   
                                                      
  
  
Hypertension, essential  
  
  
Unspecified essential hypertension  
   
                                                      
  
  
Paroxysmal atrial fibrillation (CMS/HCC)  
  
  
Atrial fibrillation  
   
                                                      
  
  
Mixed hyperlipidemia  
   
                                                      
  
  
ED (erectile dysfunction) of organic origin  
  
  
Impotence of organic origin  
  
documented in this encounter  
[facility]  
Work Phone: [phone] note*   
  
                                                    Diagnosis  
   
                                                      
  
  
Left-sided back pain, unspecified back location, unspecified chronicity  
  
documented in this encounter  
[facility]   
  
                                                    Diagnosis  
   
                                                      
  
  
Left-sided back pain, unspecified back location, unspecified chronicity- Primary  
   
                                                      
  
  
Lumbar degenerative disc disease  
   
                                                      
  
  
Spinal stenosis of lumbar region with neurogenic claudication  
  
documented in this encounter  
[facility]   
  
                                                    Diagnosis  
   
                                                      
  
  
Spinal stenosis of lumbar region with neurogenic claudication- Primary  
   
                                                      
  
  
Left-sided back pain, unspecified back location, unspecified chronicity  
   
                                                      
  
  
Lumbar degenerative disc disease  
  
documented in this encounter  
[facility]   
  
                                                    Diagnosis  
   
                                                      
  
  
Lumbar degenerative disc disease- Primary  
   
                                                      
  
  
Spinal stenosis of lumbar region with neurogenic claudication  
  
documented in this encounter  
[facility]   
  
                                                    Diagnosis  
   
                                                      
  
  
Change in voice- Primary  
  
  
Other voice and resonance disorders  
  
documented in this encounter  
[facility]  
Work Phone: [phone] note*   
  
                                                    Diagnosis  
   
                                                      
  
  
Typical atrial flutter (HCC)  
  
documented in this encounter  
[facility]   
  
                                                    Diagnosis  
   
                                                      
  
  
Plantar keratosis, acquired- Primary  
  
documented in this encounter  
[facility]   
  
                                                    Diagnosis  
   
                                                      
  
  
Benign essential hypertension- Primary  
  
  
Essential hypertension, benign  
   
                                                      
  
  
Permanent atrial fibrillation (HCC)  
  
  
Atrial fibrillation  
   
                                                      
  
  
Coronary artery disease involving native coronary artery of native heart without
   
angina pectoris  
  
documented in this encounter  
[facility]   
  
                                                    Diagnosis  
   
                                                      
  
  
Coronary artery disease involving native coronary artery of native heart without
   
angina pectoris- Primary  
   
                                                      
  
  
Typical atrial flutter (HCC)  
   
                                                      
  
  
Chronic atrial fibrillation (HCC)  
  
  
Atrial fibrillation  
  
documented in this encounter  
[facility]   
  
                                                    Diagnosis  
   
                                                      
  
  
Lumbar degenerative disc disease- Primary  
  
documented in this encounter  
[facility]   
  
                                                    Diagnosis  
   
                                                      
  
  
Chronic atrial fibrillation (HCC)  
  
  
Atrial fibrillation  
   
                                                      
  
  
ANAYA (dyspnea on exertion)  
  
  
Other dyspnea and respiratory abnormality  
   
                                                      
  
  
Coronary artery disease involving native coronary artery of native heart without
   
angina pectoris  
  
documented in this encounter  
[facility]   
  
                                                    Diagnosis  
   
                                                      
  
  
Chronic atrial fibrillation (HCC)- Primary  
  
  
Atrial fibrillation  
   
                                                      
  
  
ANAYA (dyspnea on exertion)  
  
  
Other dyspnea and respiratory abnormality  
   
                                                      
  
  
Coronary artery disease involving native coronary artery of native heart without
   
angina pectoris  
   
                                                      
  
  
Permanent atrial fibrillation (HCC)  
  
  
Atrial fibrillation  
   
                                                      
  
  
Shortness of breath  
  
documented in this encounter  
[facility]   
  
                                                    Diagnosis  
   
                                                      
  
  
Controlled type 2 diabetes mellitus without complication, without long-term 
current   
use of insulin (Multi)- Primary  
   
                                                      
  
  
ED (erectile dysfunction) of organic origin  
  
  
Impotence of organic origin  
   
                                                      
  
  
Hypertension, essential  
  
  
Unspecified essential hypertension  
   
                                                      
  
  
Iron deficiency anemia due to chronic blood loss  
  
  
Iron deficiency anemia secondary to blood loss (chronic)  
  
documented in this encounter  
[facility]  
Work Phone: [phone] note*   
  
                                                    Diagnosis  
   
                                                      
  
  
Controlled type 2 diabetes mellitus without complication, without long-term 
current   
use of insulin (Multi)- Primary  
   
                                                      
  
  
Hypertension, essential  
  
  
Unspecified essential hypertension  
   
                                                      
  
  
Iron deficiency anemia due to chronic blood loss  
  
  
Iron deficiency anemia secondary to blood loss (chronic)  
  
documented in this encounter  
[facility]  
Work Phone: [phone] of Present illness Narrative* COVID shot done.  
* colon due .  
* Sounds like all the kidney problems are stabilized and he is doing much better
   now.  
* He decided to stay on the Metformin, and with eating better eating less carbs 
  losing weight and staying on the Metformin A1c is now down to 5.7. At this 
  point I actually recommend he stay on the Metformin and keep working on his 
  good diet exercise changes.  
* Hypertension - Takes medication without side effects. No CP/SOB/Palpitations. 
  Follows a no added salt diet. Counseled diet, activity and weight loss.  
[facility]  
Work Phone: [phone] of Present illness Narrative* Diabetes Type II
   - Takes and tolerates medications. No hypoglycemia. Counseled on diet with 
  low carbohydrate intake, weight loss and increased activity levels/exercise.  
* Hypertension - Takes medication without side effects. No CP/SOB/Palpitations. 
  Follows a no added salt diet. Counseled diet, activity and weight loss.  
* Afib - Takes and tolerates medications.  
* Hyperlipidemia - Takes and tolerates medications without fatigue and myalgias.  
* A1c good 6.1.  
* wt up some.  
* Cardio added Dilt for fast heart rate. stress test last year.  
* 30 pack year smoking - stopped about 13 years ago. occ ANAYA.  
* talked about low CT scan for lung cancer screening.  
* Colon due this year, will set up at next office visit  
[facility]  
Work Phone: [phone] of Present illness Narrative* Diabetes Type II
   - Takes and tolerates medications. No hypoglycemia. Counseled on diet with 
  low carbohydrate intake, weight loss and increased activity levels/exercise.  
* Hypertension - Takes medication without side effects. No CP/SOB/Palpitations. 
  Follows a no added salt diet. Counseled diet, activity and weight loss.  
* Afib - Takes and tolerates medications.  
* Hyperlipidemia - Takes and tolerates medications without fatigue and myalgias.  
* A1c good 6.1.  
* wt up some.  
* Cardio added Dilt for fast heart rate. stress test last year.  
* 30 pack year smoking - stopped about 13 years ago. occ ANAYA.  
* talked about low CT scan for lung cancer screening.  
* Colon due this year, will set up at next office visit  
[facility]  
Work Phone: [phone] of Present illness Narrative* The patient is 
  being seen for the subsequent annual wellness visit.  
* Past Medical, Surgical and Family History: reviewed and updated in chart.  
* Medications and Supplements: Review of all medications by a prescribing 
  practitioner or clinical pharmacist (such as prescriptions, OTCs, herbal 
  therapies and supplements) documented in the medical record.  
* No, the patient is not using opioids.  
* Patient Self Assessment of Health Status: good.  
* Tobacco use: Non-User  
* Alcohol use: User  
* Illicit drug use: Non-User  
* Current diet: Diabetic Diet, does not consume adequate fluids and does consume
   caffeine.  
* Exercise Frequency: infrequently.  
* Depression/Suicide Screening: .  
* During the past 2 weeks, the patient has not felt down, depressed or hopeless.  
* During the past 2 weeks, the patient has not felt little interest or pleasure 
  in doing things.  
* Hearing Impairment: Patient has significant hearing impairment, bilaterally, 
  He uses a hearing aid.  
* Cognitive Impairment: No cognitive impairment observed.  
* Bathing: performs independently.  
* Dressing: performs independently.  
* Walking: performs independently.  
* Managing Finances: performs independently.  
* Shopping: performs independently.  
* Managing Medications: performs independently.  
* Housework / Basic Home Maintenance: performs independently.  
* Falls Risk Screening:. NICK has not fallen in the last 6 months.  
* Home safety risk factors: none.  
* Advance directives:. Advance Care Planning discussed and documented in the 
  medical record, patient did not wish or was not able to name a surrogate 
  decision maker or provide an advance care plan. Patient has living will. 
  Patient has no healthcare POA.  
* Had another large flank tattoo on the lower left leg. Has a wart below it. But
   developed multiple raised keratotic/verrucous lesions throughout the tattoo. 
  Has been since March.  
* Also he has likely seborrheic keratosis on the left arm multiple seborrheic 
  keratosis on the scalp.  
* Referral to dermatology [facility] [address]. Nitin Gilmore.  
* Hypertension - Takes medication without side effects. No CP/SOB/Palpitations. 
  Follows a no added salt diet. Counseled diet, activity and weight loss.  
* Hyperlipidemia - Takes and tolerates medications without fatigue and myalgias.  
* Diabetes Type II - Takes and tolerates medications. No hypoglycemia. Counseled
   on diet with low carbohydrate intake, weight loss and increased activity 
  levels/exercise.  
* afib sees cardio  
* When he stands he does not get any increased back pain hip pain or knee pain, 
  but he gets numbness in his left foot. When he sits down the numbness goes 
  away. Has occasional low back pain in the pastbut that is not bothering him 
  more now.  
* So in the past he would occasionally get some numbness in the upper part of 
  the left leg. That is not worse or bothering him at this time.  
* Question source of the numbness,? EMG and x-ray,? Referral orthopedics. No 
  work-up at this time. Ifthings get worse he will call sooner. Add B12 to next 
  lab.  
* Recommend flu shot and COVID booster in the fall, recommend pneumonia shots 
  shingles shot  
* Wendi over the call to schedule him for follow-up colonoscopy this year.  
[facility]  
Work Phone: [phone] of Present illness Narrative* Diabetes Type II
   - Takes and tolerates medications. No hypoglycemia. Counseled on diet with 
  low carbohydrate intake, weight loss and increased activity levels/exercise.  
* Hypertension - Takes medication without side effects. No CP/SOB/Palpitations. 
  Follows a no added salt diet. Counseled diet, activity and weight loss.  
* Hyperlipidemia - Takes and tolerates medications without fatigue and myalgias.  
* afib - Takes and tolerates medications.  
* Recommend COVID booster  
* Flu shot done  
* Colonoscopy ,? Follow-up in 3 or 5 years.  
[facility]  
Work Phone: [phone] for referral (narrative)* Consultation 
  (Routine) - Authorized  
  
                          Specialty    Diagnoses / Procedures Referred By Contac  
t Referred To Contact  
   
                                        Primary Care          
  
  
Procedures  
  
  
Follow Up In Primary Care -   
Established                               
  
  
Jason Bonilla MD  
  
  
[address] [place] [address]  
  
  
Phone: [phone]  
  
  
Fax: [phone]                         
  
  
  
  
  
                    Referral ID Status    Reason    Start Date Expiration Date V  
isits   
Requested                               Visits   
Authorized  
   
                [id number]  Authorized         8/3/2023 2024 1       1  
  
  
  
  
Electronically signed by Jason Bonilla MD at 2023 4:16 PM [facility]  
Work Phone: [phone] for referral (narrative)* Consultation 
  (Routine) - Authorized  
  
                          Specialty    Diagnoses / Procedures Referred By Contac  
t Referred To Contact  
   
                                        Primary Care          
  
  
Procedures  
  
  
Follow Up In Primary Care -   
Established                               
  
  
Jason Bonilla MD  
  
  
[address] [place] [address]  
  
  
Phone: [phone]  
  
  
Fax: [phone]                         
  
  
  
  
  
                    Referral ID Status    Reason    Start Date Expiration Date V  
isits   
Requested                               Visits   
Authorized  
   
                [id number] Authorized         10/7/2024 10/7/2025 1       1  
  
  
  
  
Electronically signed by Jason Bonilla MD at 10/07/2024 11:10 AM [facility]  
Work Phone: [phone] for referral (narrative)* Consultation 
  (Routine) - Authorized  
  
                          Specialty    Diagnoses / Procedures Referred By Contac  
t Referred To Contact  
   
                                        Primary Care          
  
  
Procedures  
  
  
Follow Up In Primary Care -   
Established                               
  
  
Jason Bonilla MD  
  
  
[address] [place] [address]  
  
  
Phone:   
tel:[phone]  
  
  
fax:[phone]                       
  
  
  
  
  
                    Referral ID Status    Reason    Start Date Expiration Date V  
isits   
Requested                               Visits   
Authorized  
   
                [id number] Authorized         2024 1       1  
  
  
  
  
Electronically signed by Jason Bonilla MD at 2024 10:13 AM EST  
  
  
[facility]  
Work Phone: [phone]  
  
History of Present Illness  
* Mundo Cortez MD - 2019 1:40 PM EST  
  
Formatting of this note may be different from the original.  
  
Patient Name: Nick Cloud  
MR #: [id number]  
Acct #: [id number]  
  
19  
  
Dear Jason Bonilla MD,  
  
Nick Cloud was seen in follow up for :  
  
Problem  
Atrial Flutter (Hcc)  
2016-CV at that time--typical  
Xarelto-stopped 1month after rfa  
2017 RFA-  
  
Cad (Coronary Artery Disease)  
 , occluded lad with collaterals.-EECP 40% ef  
  
  
  
Assessment and Plan  
  
CAD (coronary artery disease)  
No angina , sob  
  
Doing well, Medications reviewed and will continue current meds  
  
Followup 1 year  
  
Atrial flutter (HCC)  
No issues with rythm  
  
Thank you or allowing me to participate in the care of your patients.  
  
No orders of the defined types were placed in this encounter.  
  
EKG:not done  
  
Subjective:  
  
No complaints  
  
Denies chest discomfort, sob, palpitations, pnd, orthopnea,edema or syncope.  
  
Past History and Exam  
  
PMH:  
Past Medical History:  
Diagnosis Date  
Atrial flutter (HCC)  
Benign essential hypertension  
CAD (coronary artery disease)  
GERD (gastroesophageal reflux disease)  
Myocardial infarction (HCC)  
acute anteroseptal  
  
  
  
Physical exam:  
BP (!) 147/93   Pulse 69   Ht 5' 9    Wt 121.4 kg (267 lb 9.6 oz)   SpO2 93%   
BMI 39.52 kg/m  
  
General: No acute distress, alert, and oriented x3.  
HEENT: Normocephalic,  
Neck: Supple,  
Cardiovascular: Regular rate and rhythm. No , murmurs, No JVD, No Carotid Bruits  
Respiratory: Clear to auscultation bilaterally  
Abdominal: Soft, nontender, nondistended,obese.  
Skin: Normal turgor, well-hydrated,  
Extremities : No clubbing cyanosis or edema  
Neurological: Cranial nerves 2 through 12 intact grossly. No focal neurological 
deficits noted.  
Psych: Normal mood and affect.  
  
ROS/MA were reviewed  
  
Home Medications:  
  
Patient's Medications  
New Prescriptions  
No medications on file  
Previous Medications  
ASPIRIN 81 MG EC TABLET Take 81 mg by mouth daily.  
Modified Medications  
Modified Medication Previous Medication  
ATORVASTATIN (LIPITOR) 40 MG TABLET atorvastatin (LIPITOR) 40 MG tablet  
Take 1 (one) tablet (40 mg total) by mouth daily . Take 1 (one) tablet (40 mg 
total) by mouth daily.  
LISINOPRIL (PRINIVIL,ZESTRIL) 20 MG TABLET lisinopril (PRINIVIL,ZESTRIL) 20 MG 
tablet  
Take 1 (one) tablet (20 mg total) by mouth daily . Take 20 mg by mouth daily .  
METOPROLOL TARTRATE (LOPRESSOR) 50 MG TABLET metoprolol tartrate (LOPRESSOR) 50 
MG tablet  
Take 1 (one) tablet (50 mg total) by mouth 2 (two) times a day . Take 50 mg by 
mouth 2 (two) times a day.  
NITROGLYCERIN (NITROSTAT) 0.4 MG SL TABLET nitroGLYCERIN (NITROSTAT) 0.4 MG SL 
tablet  
Place 1 (one) tablet (0.4 mg total) under the tongue every 5 (five) minutes as 
needed for chest pain . Place 1 tablet (0.4 mg total) under the tongue every 5 
(five) minutes as needed for chest pain.  
Discontinued Medications  
ESOMEPRAZOLE (NEXIUM) 40 MG CAPSULE Take 40 mg by mouth daily .  
LISINOPRIL (PRINIVIL,ZESTRIL) 20 MG TABLET TAKE 1 TABLET DAILY (NEED TO SCHEDULE
 APPOINTMENT FOR FURTHER REFILLS)  
METOPROLOL TARTRATE (LOPRESSOR) 50 MG TABLET TAKE 1 TABLET TWICE A DAY (NEED TO 
SCHEDULE APPOINTMENT FOR FURTHER REFILLS)  
METOPROLOL TARTRATE (LOPRESSOR) 50 MG TABLET TAKE 1 TABLET TWICE A DAY (NEED TO 
SCHEDULE APPOINTMENT FOR FURTHER REFILLS)  
  
  
  
* Loren Frye MA - 2019 1:21 PM EST  
  
Review of Systems  
Constitution: Negative for diaphoresis, malaise/fatigue, weight gain and weight 
loss.  
HENT: Positive for tinnitus. Negative for hearing loss and nosebleeds.  
Eyes: Negative for blurred vision and visual disturbance.  
Cardiovascular: Negative for chest pain, claudication, cyanosis, dyspnea on 
exertion, irregular heartbeat, leg swelling, near-syncope, orthopnea, 
palpitations, paroxysmal nocturnal dyspnea and syncope.  
Respiratory: Negative for hemoptysis, shortness of breath and snoring.  
Endocrine: Negative for cold intolerance and heat intolerance.  
Hematologic/Lymphatic: Does not bruise/bleed easily.  
Skin: Negative for flushing, poor wound healing and rash.  
Musculoskeletal: Negative for back pain, muscle weakness and myalgias.  
Gastrointestinal: Negative for abdominal pain, change in bowel habit, melena, 
nausea and vomiting.  
Genitourinary: Negative for decreased libido and hematuria.  
Neurological: Negative for loss of balance and numbness.  
Psychiatric/Behavioral: Negative for memory loss. The patient is not 
nervous/anxious.  
  
  
  
  
in this encounter* Mundo Cortez MD - 2020 8:57 AM EST  
  
Formatting of this note might be different from the original.  
  
Patient Name: Nick Cloud  
MR #: [id number]  
Acct #: [id number]  
  
Interventional Cardiology  
Mundo Cortez MD, [facility]  
  
20  
  
Dear Jason Bonilla MD,  
  
Nick Cloud was seen in follow up for :  
  
Problem  
Covid-2020-November-- got from spouse who is a   
  
Atrial Fibrillation (Hcc)  
Ed (Erectile Dysfunction)  
Cad (Coronary Artery Disease)  
 , occluded lad with collaterals.-EECP 40% ef  
  
  
  
Assessment and Plan  
Atrial fibrillation (HCC)  
2020 Asx  
Xarelto started  
Repeat 2 D cardiac echocardiogram and stress-- 24 hr holter  
  
CAD (coronary artery disease)  
No angina  
Doing well, Medications reviewed and will continue current meds  
Followup 1 year  
Will need stress for Atrial Fibrillation  
  
ED (erectile dysfunction)  
Has used Viagra before which works  
  
Thank you or allowing me to participate in the care of your patients.  
Orders Placed This Encounter  
Procedures  
NM Myocardial Perfusion Multiple SPECT  
Holter monitor - 24 hour  
Echocardiogram complete  
  
Return in about 1 year (around 2021).  
  
EKG:Atrial Fibrillation with controlled rate.  
  
Subjective:  
Nick Cloud is a 65 y.o. y/o male : No complaints had COVID last month  
  
Denies chest discomfort, sob, palpitations, pnd, orthopnea,edema or syncope.  
Past History and Exam  
PMH:  
  
Past Medical History:  
Diagnosis Date  
Atrial flutter (HCC)  
Benign essential hypertension  
CAD (coronary artery disease)  
GERD (gastroesophageal reflux disease)  
Myocardial infarction (HCC)  
acute anteroseptal  
  
Physical exam:  
/89 (BP Location: Right arm, Patient Position: Sitting)   Pulse 90   Ht 5'
 9    Wt 113.4 kg (250 lb)   SpO2 94%   BMI 36.92 kg/m  
  
General: No acute distress, alert, and oriented x3.  
HEENT: Normocephalic, nl conjunctiva  
Neck: Supple, no gross thyromegaly,no palpable neck adenopathy  
Cardiovascular: Irregular rate and rhythm. no murmurs, .No JVD, No Carotid 
Bruits, no LE edema  
:Respiratory: Clear to auscultation bilaterally without wheezes, rhonchi, or 
rales  
Abdominal: soft, nontender, nondistended,no gross HSM or masses noted.  
Skin: Normal turgor,no major peripheral rashes noted.  
Extremities : No clubbing, cyanosis  
Neurological: Cranial nerves 2 through 12 intact grossly. No focal neurological 
deficits noted.  
Psych: Normal mood and affect.  
  
ROS/MA were reviewed  
  
Home Medications:  
  
Patient's Medications  
New Prescriptions  
RIVAROXABAN (XARELTO) 20 MG TAB Take 1 (one) tablet (20 mg total) by mouth daily
 .  
SILDENAFIL (VIAGRA) 50 MG TABLET Take 1 (one) tablet (50 mg total) by mouth as 
needed for erectile dysfunction .  
Previous Medications  
ASPIRIN 81 MG EC TABLET Take 81 mg by mouth daily.  
NITROGLYCERIN (NITROSTAT) 0.4 MG SL TABLET Place 1 (one) tablet (0.4 mg total) 
under the tongue every 5 (five) minutes as needed for chest pain .  
Modified Medications  
Modified Medication Previous Medication  
ATORVASTATIN (LIPITOR) 40 MG TABLET atorvastatin (LIPITOR) 40 MG tablet  
Take 1 (one) tablet (40 mg total) by mouth daily . TAKE 1 TABLET DAILY  
LISINOPRIL (PRINIVIL,ZESTRIL) 20 MG TABLET lisinopriL (PRINIVIL,ZESTRIL) 20 MG 
tablet  
Take 1 (one) tablet (20 mg total) by mouth daily . TAKE 1 TABLET DAILY  
METOPROLOL TARTRATE (LOPRESSOR) 50 MG TABLET metoprolol tartrate (LOPRESSOR) 50 
MG tablet  
Take 1 (one) tablet (50 mg total) by mouth 2 (two) times a day . TAKE 1 TABLET 
TWICE A DAY  
Discontinued Medications  
XARELTO 20 MG TAB Take 1 tablet (20 mg total) by mouth daily Take for 30 days.  
  
  
  
  
Electronically signed by Mundo Cortez MD at 2020 8:59 AM EST  
  
  
* Loren Frye MA - 2020 8:40 AM EST  
  
Review of Systems  
Constitution: Negative for diaphoresis, malaise/fatigue, weight gain and weight 
loss.  
HENT: Negative for hearing loss, nosebleeds and tinnitus.  
Eyes: Negative for blurred vision and visual disturbance.  
Cardiovascular: Negative for chest pain, claudication, cyanosis, dyspnea on 
exertion, irregular heartbeat, leg swelling, near-syncope, orthopnea, 
palpitations, paroxysmal nocturnal dyspnea and syncope.  
Respiratory: Negative for hemoptysis, shortness of breath and snoring.  
Endocrine: Negative for cold intolerance and heat intolerance.  
Hematologic/Lymphatic: Does not bruise/bleed easily.  
Skin: Negative for flushing, poor wound healing and rash.  
Musculoskeletal: Negative for back pain, muscle weakness and myalgias.  
Gastrointestinal: Negative for abdominal pain, change in bowel habit, melena, 
nausea and vomiting.  
Genitourinary: Negative for decreased libido and hematuria.  
Neurological: Negative for loss of balance and numbness.  
Psychiatric/Behavioral: Negative for memory loss. The patient is not 
nervous/anxious.  
  
  
  
  
  
Electronically signed by Loren Frye MA at 2020 8:59 AM EST  
  
  
documented in this encounter  
  
Assessments  
  
  
                                                    Diagnosis  
   
                                                      
  
  
Coronary artery disease involving native coronary artery of native heart without
   
angina pectoris  
   
                                                      
  
  
Typical atrial flutter (HCC)  
  
  
  
                                                    Diagnosis  
   
                                                      
  
  
Coronary artery disease involving native coronary artery of native heart without
   
angina pectoris  
   
                                                      
  
  
COVID-19  
   
                                                      
  
  
Chronic atrial fibrillation  
  
  
Atrial fibrillation  
   
                                                      
  
  
Erectile dysfunction due to arterial insufficiency  
  
  
  
                                                    Diagnosis  
   
                                                      
  
  
Chronic atrial fibrillation  
  
  
Atrial fibrillation  
  
  
  
                                                    Diagnosis  
   
                                                      
  
  
Chronic atrial fibrillation  
  
  
Atrial fibrillation  
  
  
  
Summary Purpose  
  
  
                                                      
  
  
  
Family History  
No Family History Records Found      Mother  
  
                                Name            Dates           Details  
   
                                                    No pertinent family history(  
V49.89, Z78.9)  
                                                    Status:Active  
  
                                     Father  
  
                                Name            Dates           Details  
   
                                                    No pertinent family history(  
V49.89, Z78.9)  
                                                    Status:Active  
  
                                     Mother  
  
                                Name            Dates           Details  
   
                                                    No pertinent family history(  
V49.89, Z78.9)  
                                                    Status:Active  
  
                                     Father  
  
                                Name            Dates           Details  
   
                                                    No pertinent family history(  
V49.89, Z78.9)  
                                                    Status:Active  
  
                              Unknown Family Member  
  
                                Name            Dates           Details  
   
                                                    No pertinent family history:  
 Mother, Father(V49.89, Z78.9)  
                                                    Status:Active  
  
                              Unknown Family Member  
  
                                Name            Dates           Details  
   
                                                    No pertinent family history:  
 Mother, Father(V49.89, Z78.9)  
                                                    Status:Active  
  
                              Unknown Family Member  
  
                                Name            Dates           Details  
   
                                                    No pertinent family history:  
 Mother, Father(V49.89, Z78.9)  
                                                    Status:Active  
  
                              Unknown Family Member  
  
                                Name            Dates           Details  
   
                                                    No pertinent family history:  
 Mother, Father(V49.89, Z78.9)  
                                                    Status:Active  
  
                              Unknown Family Member  
  
                                Name            Dates           Details  
   
                                                    No pertinent family history:  
 Mother, Father(V49.89, Z78.9)  
                                                    Status:Active  
  
                              Unknown Family Member  
  
                                Name            Dates           Details  
   
                                                    No pertinent family history:  
 Mother, Father(V49.89, Z78.9)  
                                                    Status:Active  
  
                              Unknown Family Member  
  
                                Name            Dates           Details  
   
                                                    No pertinent family history:  
 Mother, Father(V49.89, Z78.9)  
                                                    Status:Active  
  
                              Unknown Family Member  
  
                                Name            Dates           Details  
   
                                                    No pertinent family history:  
 Mother, Father(V49.89, Z78.9)  
                                                    Status:Active  
  
                              Unknown Family Member  
  
                                Name            Dates           Details  
   
                                                    No pertinent family history:  
 Mother, Father(V49.89, Z78.9)  
                                                    Status:Active  
  
  
  
Advance Directives  
No Advanced Directives Records FoundDocuments on File  
  
                          Type         Date Recorded Patient Representative Expl  
anation  
   
                          Advance Directives and Living Will                      
         
  
                           Latest Code Status on File  
  
                          Code Status  Date Activated Date Inactivated Comments  
   
                          Full Code    3/7/2017 6:15 PM 3/8/2017 11:42 AM   
  
  
  
                                                                   
   
                          Full Code    3/6/2017 5:22 PM 3/6/2017 7:22 PM   
  
                                Documents on File  
  
                          Type         Date Recorded Patient Representative Expl  
anation  
   
                                                    Advance Directives and Livin  
g   
Will                2020 1:22 PM                        
  
                                Documents on File  
  
                          Type         Date Recorded Patient Representative Expl  
anation  
   
                                                    Advance Directives and Livin  
g   
Will                2020 1:22 PM                        
  
                           Latest Code Status on File  
  
                          Code Status  Date Activated Date Inactivated Comments  
   
                          Full Code    3/7/2017 6:15 PM 3/8/2017 11:42 AM   
  
  
  
                                                                   
   
                          Full Code    3/6/2017 5:22 PM 3/6/2017 7:22 PM   
  
                           Latest Code Status on File  
  
                                Date Activated  Date Inactivated Comments  
   
                                3/7/2017 6:15 PM 3/8/2017 11:42 AM   
  
                                    Full Code  
  
                                Date Activated  Date Inactivated Comments  
   
                                3/6/2017 5:22 PM 3/6/2017 7:22 PM   
  
                           Latest Code Status on File  
  
                          Code Status  Date Activated Date Inactivated Comments  
   
                          Full Code    3/7/2017 6:15 PM 3/8/2017 11:42 AM   
  
                               Code Status History  
  
                          Code Status  Date Activated Date Inactivated Comments  
   
                          Full Code    3/6/2017 5:22 PM 3/6/2017 7:22 PM   
  
                           Latest Code Status on File  
  
                          Code Status  Date Activated Date Inactivated Comments  
   
                          Full Code    3/7/2017 6:15 PM 3/8/2017 11:42 AM   
  
                               Code Status History  
  
                          Code Status  Date Activated Date Inactivated Comments  
   
                          Full Code    3/6/2017 5:22 PM 3/6/2017 7:22 PM   
  
                                Documents on File  
  
                          Type         Date Recorded Patient Representative Expl  
anation  
   
                          Living Will  2016                  
  
                                Documents on File  
  
                          Type         Date Recorded Patient Representative Expl  
anation  
   
                          Living Will  2016                  
  
  
  
                                Date Activated  Date Inactivated Comments  
   
                                3/7/2017 6:15 PM 3/8/2017 11:42 AM   
  
  
  
                                Date Activated  Date Inactivated Comments  
   
                                3/6/2017 5:22 PM 3/6/2017 7:22 PM   
  
  
  
                                Date Activated  Date Inactivated Comments  
   
                                3/7/2017 6:15 PM 3/8/2017 11:42 AM   
  
  
  
                                Date Activated  Date Inactivated Comments  
   
                                3/6/2017 5:22 PM 3/6/2017 7:22 PM   
  
  
  
Reason for Referral  
  
  
                          Status       Reason       Specialty    Diagnoses /   
Procedures                Referred By Contact       Referred To   
Contact  
   
                          New Request               Radiology      
  
  
Diagnoses  
  
  
Chronic atrial   
fibrillation  
  
  
  
Procedures  
  
  
NM Myocardial   
Perfusion Multiple   
SPECT                                     
  
  
Mundo Cortez MD [address] [place] [address]  
  
  
Phone: [phone]  
  
  
Fax: [phone]                         
  
  
  
  
  
                          Status       Reason       Specialty    Diagnoses /   
Procedures                              Referred By   
Contact                                 Referred To   
Contact  
   
                          New Request               Cardiology     
  
  
Diagnoses  
  
  
Chronic atrial   
fibrillation  
  
  
  
Procedures  
  
  
Echocardiogram   
complete                                  
  
  
Mundo Cortez MD [address] [place], [place] [address] [address]  
  
  
Phone:   
[phone]  
  
  
Fax: [phone]                         
  
  
  
  
  
                          Status       Reason       Specialty    Diagnoses /   
Procedures                Referred By Contact       Referred To   
Contact  
   
                          Authorized                Cardiology     
  
  
Diagnoses  
  
  
Chronic atrial   
fibrillation  
  
  
  
Procedures  
  
  
Holter monitor - 24   
hour                                      
  
  
Mundo Cortez MD [address] [place] [address]  
  
  
Phone: [phone]  
  
  
Fax: [phone]                         
  
  
  
  
  
                    Status    Reason    Specialty Diagnoses / Procedures Referre  
d By Contact Referred To   
Contact  
   
                          Closed                    Radiology      
  
  
Diagnoses  
  
  
Chronic atrial   
fibrillation  
  
  
  
Procedures  
  
  
NM Myocardial Perfusion   
Multiple SPECT                            
  
  
Mundo Cortez MD [address]  
  
  
[place] [address]  
  
  
Phone: [phone]  
  
  
Fax: [phone]                         
  
  
  
  
  
                    Status    Reason    Specialty Diagnoses / Procedures Referre  
d By Contact Referred To   
Contact  
   
                          Closed                    Cardiology     
  
  
Diagnoses  
  
  
Chronic atrial   
fibrillation  
  
  
  
Procedures  
  
  
Holter monitor - 24   
hour                                      
  
  
Issa, Mundo L.,   
MD  
  
  
[address] [place] [address]  
  
  
Phone: [phone]  
  
  
Fax: [phone]                         
  
  
  
  
  
                          Specialty    Diagnoses / Procedures Referred By Contac  
t Referred To Contact  
   
                                        Cardiology            
  
  
Diagnoses  
  
  
Typical atrial flutter   
(HCC)  
  
  
  
Procedures  
  
  
ECG 12 Lead                               
  
  
Mundo Cortez MD  
  
  
[address] [place] [address]  
  
  
Phone: [phone]  
  
  
Fax: [phone]                         
  
  
  
  
  
                    Referral ID Status    Reason    Start Date Expiration Date V  
isits   
Requested                               Visits   
Authorized  
   
                [id number] Authorized         2021 2       2  
  
  
  
                          Specialty    Diagnoses / Procedures Referred By Contac  
t Referred To Contact  
   
                                        Cardiology            
  
  
Diagnoses  
  
  
ANAYA (dyspnea on exertion)  
  
  
  
Procedures  
  
  
ECG 12 lead                               
  
  
Mundo Cortez MD  
  
  
[address] [place] [address]  
  
  
Phone: [phone]  
  
  
Fax: [phone]                         
  
  
  
  
  
                    Referral ID Status    Reason    Start Date Expiration Date V  
isits   
Requested                               Visits   
Authorized  
   
                [id number] Authorized         2022 1       1  
  
  
  
                          Specialty    Diagnoses / Procedures Referred By Contac  
t Referred To Contact  
   
                                        Cardiology            
  
  
Diagnoses  
  
  
ANAYA (dyspnea on exertion)  
  
  
  
Procedures  
  
  
Echocardiogram complete                   
  
  
Mundo Cortez MD  
  
  
[address] [place] [address]  
  
  
Phone: [phone]  
  
  
Fax: [phone]                         
  
  
  
  
  
                    Referral ID Status    Reason    Start Date Expiration Date V  
isits   
Requested                               Visits   
Authorized  
   
                [id number] New Request         2022 1       1  
  
  
  
                          Specialty    Diagnoses / Procedures Referred By Contac  
t Referred To Contact  
   
                                        Cardiology            
  
  
Diagnoses  
  
  
Benign essential   
hypertension  
  
  
  
Procedures  
  
  
ECG 12 Lead                               
  
  
Mundo Cortez MD  
  
  
[address] [place] [address]  
  
  
Phone: [phone]  
  
  
Fax: [phone]                         
  
  
  
  
  
                    Referral ID Status    Reason    Start Date Expiration Date V  
isits   
Requested                               Visits   
Authorized  
   
                [id number] Authorized         2023 3       3  
  
  
  
                          Specialty    Diagnoses / Procedures Referred By Contac  
t Referred To Contact  
   
                                        Orthopedic Surgery    
  
  
Diagnoses  
  
  
Left-sided back pain,   
unspecified back   
location, unspecified   
chronicity  
                                          
  
  
Kenton Hinojosa Jr.,   
DPM  
  
  
[address] [place] [address]  
  
  
Phone: [phone]  
  
  
Fax: [phone]                         
  
  
Rj Raines MD  
  
  
[address] [place] [address]  
  
  
Phone: [phone]  
  
  
Fax: [phone]  
  
  
  
                    Referral ID Status    Reason    Start Date Expiration Date V  
isits   
Requested                               Visits   
Authorized  
   
                [id number] Authorized         2023 1       1  
  
  
  
                          Specialty    Diagnoses / Procedures Referred By Contac  
t Referred To Contact  
   
                                        Radiology             
  
  
Diagnoses  
  
  
Left-sided back pain,   
unspecified back location,   
unspecified chronicity  
  
  
Lumbar degenerative disc   
disease  
  
  
  
Procedures  
  
  
MR Lumbar Spine Without   
Contrast                                  
  
  
Rj Raines MD  
  
  
[address] [place] [address]  
  
  
Phone: [phone]  
  
  
Fax: [phone]                         
  
  
  
  
  
                    Referral ID Status    Reason    Start Date Expiration Date V  
isits   
Requested                               Visits   
Authorized  
   
                [id number] New Request         2023 1       1  
  
  
  
                          Specialty    Diagnoses / Procedures Referred By Contac  
t Referred To Contact  
   
                                        Cardiology            
  
  
Diagnoses  
  
  
Benign essential   
hypertension  
  
  
Permanent atrial   
fibrillation (HCC)  
  
  
Coronary artery disease   
involving native coronary   
artery of native heart   
without angina pectoris  
  
  
  
Procedures  
  
  
ECG 12 lead                               
  
  
Mundo Cortez MD  
  
  
[address] [address]  
  
  
Phone: [phone]  
  
  
Fax: [phone]                         
  
  
  
  
  
                    Referral ID Status    Reason    Start Date Expiration Date V  
isits   
Requested                               Visits   
Authorized  
   
                [id number] Authorized         2024 10      10  
  
  
  
                          Specialty    Diagnoses / Procedures Referred By Contac  
t Referred To Contact  
   
                                        Radiology             
  
  
Diagnoses  
  
  
Chronic atrial fibrillation   
(HCC)  
  
  
ANAYA (dyspnea on exertion)  
  
  
Coronary artery disease   
involving native coronary   
artery of native heart   
without angina pectoris  
  
  
Permanent atrial fibrillation   
(HCC)  
  
  
Shortness of breath  
  
  
  
Procedures  
  
  
NM Myocardial Perfusion   
Multiple SPECT                            
  
  
Mundo Cortez MD  
  
  
[address] [place] [address]  
  
  
Phone: [phone]  
  
  
Fax: [phone]                         
  
  
  
  
  
                    Referral ID Status    Reason    Start Date Expiration Date V  
isits   
Requested                               Visits   
Authorized  
   
                [id number] New Request         2024 4       4  
  
  
  
Instructions  
* Patient Instructions*   
  
Loren Frye MA - 2020 8:45 AM EST  
  
  
  
How to contact your Care Team:  
Provider:  
Dr. Cortez  
  
Nurse:  
Simeon Rosales RN  
  
Phone:  
[phone]  
  
Fax:  
[phone]  
  
In case of an emergency please call 911.  
  
REFILLS:  
When in need for refills please call your care team or the office at 
[phone].  
Please include medication name, pharmacy name, and specify 30-day or 90-day 
supply.  
Please check with your pharmacy within 24 hours of request for your refill.  
You must follow up as directed to continue current refills.  
Thank you!  
  
  
  
  
Electronically signed by Loren Frye MA at 2020 8:39 AM EST  
  
  
  
  
documented in this encounter  
  
Chief Complaint  
6MO CONT. DMII, AFIB CHKHematuria6 MO DMII HTN CK REV LABS6 MO DMII HTN CK REV 
[id number] FU REV LABS(DONE)6 MO F/U. Rev Labs.  
  
Additional Source Comments  
  
     Assessment & Plan Note - Mundo Cortez MD - 2019 1:37 PM   
ESTAssessment & Plan Note - Mundo Cortez MD - 2019 1:29 PM EST  
  
                                                    Miscellaneous Notes (unrecog  
nized section and content)  
   
                                                      
  
  
Associated Problem(s): Atrial flutter (HCC)  
  
  
No issues with rythm  
  
  
Associated Problem(s): CAD (coronary artery disease)  
  
  
No angina , sob  
  
Doing well, Medications reviewed and will continue current meds  
  
Followup 1 year  
  
in this encounter  
  
  
Associated Problem(s): ED (erectile dysfunction)  
  
  
Has used Viagra before which works  
  
  
Electronically signed by Mundo Cortez MD at 2020 8:53 AM EST  
  
  
Associated Problem(s): CAD (coronary artery disease)  
  
  
No angina  
Doing well, Medications reviewed and will continue current meds  
Followup 1 year  
Will need stress for Atrial Fibrillation  
  
  
Electronically signed by Mundo Cortez MD at 2020 8:52 AM EST  
  
  
Associated Problem(s): Atrial fibrillation (HCC)  
  
  
2020 Asx  
Xarelto started  
Repeat 2 D cardiac echocardiogram and stress-- 24 hr holter  
  
  
Electronically signed by Mundo Cortez MD at 2020 8:56 AM 
ESTdocumented   
in this encounter  
  
  
  
  
                                                    PREGNANCY (unrecognized sect  
ion and content)  
   
                                                    No Pregnancy Status Records   
FoundNo Pregnancy Status Records FoundNo Pregnancy   
Status   
Records FoundNo Pregnancy Status Records FoundNo Pregnancy Status Records 
FoundNo   
Pregnancy Status Records FoundNo Pregnancy Status Records FoundNo Pregnancy 
Status   
Records FoundNo Pregnancy Status Records FoundNo Pregnancy Status Records 
FoundNo   
Pregnancy Status Records Found  
  
  
  
  
                                                    INFORMATION SOURCE (unrecogn  
ized section and content)  
   
                                          
  
  
  
                                        DATE CREATED        AUTHOR  
   
                                2019                      [facility]  
  
  
  
                                DATE CREATED    AUTHOR          AUTHOR'S ORGANIZ  
ATION  
   
                                2023                      [facility]  
  
  
  
                                DATE CREATED    AUTHOR          AUTHOR'S ORGANIZ  
ATION  
   
                                2023                       [employer]  
  
  
  
                                DATE CREATED    AUTHOR          AUTHOR'S ORGANIZ  
ATION  
   
                                2023                      [facility]  
  
  
  
                                DATE CREATED    AUTHOR          AUTHOR'S ORGANIZ  
ATION  
   
                                10/28/2023                      [facility]  
  
  
  
                                DATE CREATED    AUTHOR          AUTHOR'S ORGANIZ  
ATION  
   
                                2024                      [facility]  
  
  
  
                                DATE CREATED    AUTHOR          AUTHOR'S ORGANIZ  
ATION  
   
                                09/15/2024                      [facility]  
  
  
  
                                DATE CREATED    AUTHOR          AUTHOR'S ORGANIZ  
ATION  
   
                                10/15/2024                      [facility]  
  
  
  
                                DATE CREATED    AUTHOR          AUTHOR'S ORGANIZ  
ATION  
   
                                10/18/2024                      [facility]  
s  
  
  
  
                                DATE CREATED    AUTHOR          AUTHOR'S ORGANIZ  
ATION  
   
                                2024                      [facility]  
  
  
  
                                DATE CREATED    AUTHOR          AUTHOR'S ORGANIZ  
ATION  
   
                                2024                      [facility] [place] [facility]  
  
  
  
  
  
                                                    Reason for Visit (unrecogniz  
ed section and content)  
   
                                          
  
  
  
                                        Reason              Comments  
   
                                        Follow-up           2 months follow up s  
tress test, anemia, DM, HTN, HL. Pedal edema, SOB  
  
  
  
                          Specialty    Diagnoses / Procedures Referred By Contac  
t Referred To Contact  
   
                                        Primary Care          
  
  
Procedures  
  
  
Follow Up In Primary Care -   
Established                               
  
  
Jason Bonilla MD  
  
  
[address] [place] [address]  
  
  
Phone:   
tel:[phone]  
  
  
fax:[phone]                       
  
  
  
  
  
                    Referral ID Status    Reason    Start Date Expiration Date V  
isits   
Requested                               Visits   
Authorized  
   
                [id number] Authorized         10/7/2024 10/7/2025 1       1  
  
  
  
                                        Reason              Comments  
   
                                        Follow-up           2 month medication c  
heck; HTN, DM; Review labs  
  
  
  
                          Specialty    Diagnoses / Procedures Referred By Contac  
t Referred To Contact  
   
                                        Primary Care          
  
  
Procedures  
  
  
Follow Up In Primary Care -   
Established                               
  
  
Jason Bonilla MD  
  
  
[address] [place] [address]  
  
  
Phone: [phone]  
  
  
Fax: [phone]                         
  
  
  
  
  
                    Referral ID Status    Reason    Start Date Expiration Date V  
isits   
Requested                               Visits   
Authorized  
   
                [id number] Authorized         2024 2025 1       1  
  
  
  
                          Specialty    Diagnoses / Procedures Referred By Contac  
t Referred To Contact  
   
                                        Cardiology            
  
  
Diagnoses  
  
  
Chronic atrial fibrillation   
(HCC)  
  
  
ANAYA (dyspnea on exertion)  
  
  
Coronary artery disease   
involving native coronary   
artery of native heart   
without angina pectoris  
  
  
  
Procedures  
  
  
ECG 12 lead                               
  
  
Mundo Cortez MD  
  
  
[address] [place] [id number]  
  
  
Phone: [phone]  
  
  
Fax: [phone]                         
  
  
  
  
  
                    Referral ID Status    Reason    Start Date Expiration Date V  
isits   
Requested                               Visits   
Authorized  
   
                                        [id number]            Pending   
Review                    8/15/2024    8/15/2025    1            1  
  
  
  
                                        Reason              Comments  
   
                                        Foot Problem        L foot callus/warts   
x 5 wks - pt states that the spots are on the   
great   
toe , heel and bottom of the foot on the lateral side  
  
  
  
                                Reason          Onset Date      Comments  
   
                                Medication Refill 2023        
  
  
  
                                        Reason              Comments  
   
                                        Rectal Bleeding       
   
                                        Hoarseness          xWK  
  
  
  
                          Specialty    Diagnoses / Procedures Referred By Contac  
t Referred To Contact  
   
                                        Neurology             
  
  
Diagnoses  
  
  
Left-sided back pain,   
unspecified back location,   
unspecified chronicity  
                                          
  
  
Rj Raines MD  
  
  
[address] [place] [address]  
  
  
Phone: [phone]  
  
  
Fax: [phone]                         
  
  
Russ Mir MD  
  
  
[address] [place] [address]  
  
  
Phone: [phone]  
  
  
Fax: [phone]  
  
  
  
                    Referral ID Status    Reason    Start Date Expiration Date V  
isits   
Requested                               Visits   
Authorized  
   
                                [id number]        Closed            
  
  
Specialty   
Services   
Required/Asha  
[facility]   
[facility]        2023       7/10/2024       1               1  
  
  
  
                                        Reason              Comments  
   
                                        Annual Exam         [facility]  
  
  
  
                                Reason          Onset Date      Comments  
   
                                Medication Refill 2023        
  
  
  
                                        Reason              Comments  
   
                                        Pain                MRI RESULTS  
   
                                        Follow-up           MRI RESULTS  
  
  
  
                                        Reason              Comments  
   
                                        Follow-up           Go over back xrays -  
  
  
  
                                        Reason              Comments  
   
                                        Follow-up           Left ankle foot pain  
 when he stands for about 10 seconds it feels like  
 its   
going to sleep  
  
  
  
                          Specialty    Diagnoses / Procedures Referred By Contac  
t Referred To Contact  
   
                                        Cardiology            
  
  
Diagnoses  
  
  
ANAYA (dyspnea on exertion)  
  
  
  
Procedures  
  
  
ECG 12 lead                               
  
  
Mundo Cortez MD  
  
  
[address] [place] [id number]  
  
  
Phone: [phone]  
  
  
Fax: [phone]                         
  
  
  
  
  
                Referral ID Status  Reason  Start Date Expiration Date Visits Re  
quested Visits   
Authorized  
   
                [id number] Closed          2022 1       1  
  
  
  
                                Reason          Onset Date      Comments  
   
                                Patient Concern 2022        
  
  
  
                                        Reason              Comments  
   
                                        Follow-up             
  
  
  
                                        Reason              Comments  
   
                                        Medication Refill   atorvastatin (LIPITO  
R) 40 MG tablet  
  
  
  
                                Reason          Onset Date      Comments  
   
                                Medication Refill 10/25/2021        
  
  
  
                    Status    Reason    Specialty Diagnoses / Procedures Referre  
d By Contact Referred To   
Contact  
   
                          Closed                    Cardiology     
  
  
Diagnoses  
  
  
Chronic atrial   
fibrillation  
  
  
  
Procedures  
  
  
Echocardiogram   
complete w contrast  
  
  
Echocardiogram   
complete                                  
  
  
Mundo Cortez MD  
  
  
[address] [place] [address]  
  
  
Phone: [phone]  
  
  
Fax: [phone]                         
  
  
  
  
  
                    Status    Reason    Specialty Diagnoses / Procedures Referre  
d By Contact Referred To   
Contact  
   
                          Closed                    Radiology      
  
  
Diagnoses  
  
  
Chronic atrial   
fibrillation  
  
  
  
Procedures  
  
  
NM Myocardial Perfusion   
Multiple SPECT                            
  
  
Mundo Cortez MD  
  
  
[address] [place] [address]  
  
  
Phone: [phone]  
  
  
Fax: [phone]                         
  
  
  
  
  
                                        Reason              Comments  
   
                                        Annual Exam         no complaints  
  
  
  
  
  
                                                    Care Teams (unrecognized sec  
tion and content)  
   
                                          
  
  
  
                      Team Member Relationship Specialty  Start Date End Date  
   
                                                      
  
  
Jason Bonilla MD  
  
  
 [place] [address]  
  
  
[phone] (Work)  
  
  
[phone] (Fax) PCP - General                   14           
  
  
  
                      Team Member Relationship Specialty  Start Date End Date  
   
                                                      
  
  
Jason Bonilla MD  
  
  
 [place] [address]  
  
  
[phone] (Work)  
  
  
[phone] (Fax) PCP - General                   14           
  
  
  
                      Team Member Relationship Specialty  Start Date End Date  
   
                                                      
  
  
Jason Bonilla MD  
  
  
 [place] [address]  
  
  
[phone] (Work)  
  
  
[phone] (Fax) PCP - General                   14           
  
  
  
                      Team Member Relationship Specialty  Start Date End Date  
   
                                                      
  
  
Jason Bonilla MD  
  
  
 [place] [address]  
  
  
[phone] (Work)  
  
  
[phone] (Fax) PCP - General                   14           
  
  
  
                      Team Member Relationship Specialty  Start Date End Date  
   
                                                      
  
  
Jason Bonilla MD  
  
  
 [place] [address]  
  
  
[phone] (Work)  
  
  
[phone] (Fax) PCP - General                   14           
  
  
  
                      Team Member Relationship Specialty  Start Date End Date  
   
                                                      
  
  
Jason Bonilla MD  
  
  
 [address]  
  
  
[place] [address]  
  
  
[phone] (Work)  
  
  
[phone] (Fax) PCP - General                   14           
  
  
  
                      Team Member Relationship Specialty  Start Date End Date  
   
                                                      
  
  
Furness, Jason Lujan MD  
  
  
 [address]  
  
  
[place] [address]  
  
  
[phone] (Work)  
  
  
[phone] (Fax) PCP - General                   14           
  
  
  
                      Team Member Relationship Specialty  Start Date End Date  
   
                                                      
  
  
Furness, Jason Lujan MD  
  
  
 [facility]  
  
  
[place] [address]  
  
  
[phone] (Work)  
  
  
[phone] (Fax) PCP - General                   14           
  
  
  
                      Team Member Relationship Specialty  Start Date End Date  
   
                                                      
  
  
Furness, Jason Lujan MD  
  
  
 [facility]  
  
  
[place] [address]  
  
  
[phone] (Work)  
  
  
[phone] (Fax) PCP - General                   14           
  
  
  
                      Team Member Relationship Specialty  Start Date End Date  
   
                                                      
  
  
Furness, Jason Lujan MD  
  
  
 [facility]  
  
  
[place] [address]  
  
  
[phone] (Work)  
  
  
[phone] (Fax) PCP - General                   14           
  
  
  
                      Team Member Relationship Specialty  Start Date End Date  
   
                                                      
  
  
Furness, Jason Lujan MD  
  
  
[address] [place] [address]  
  
  
[phone] (Work)  
  
  
[phone] (Fax) PCP - General                   14           
  
  
  
                      Team Member Relationship Specialty  Start Date End Date  
   
                                                      
  
  
Furness, Jason Lujan MD  
  
  
 [facility]  
  
  
[place] [address]  
  
  
[phone] (Work)  
  
  
[phone] (Fax) PCP - General                   14           
  
  
  
                      Team Member Relationship Specialty  Start Date End Date  
   
                                                      
  
  
FurnessJason MD  
  
  
 [place] [address]  
  
  
[phone] (Work)  
  
  
[phone] (Fax) PCP - General                   14           
  
  
  
                      Team Member Relationship Specialty  Start Date End Date  
   
                                                      
  
  
Furness, Jason Lujan MD  
  
  
 [place] [address]  
  
  
[phone] (Work)  
  
  
[phone] (Fax) PCP - General                   14           
  
  
  
                      Team Member Relationship Specialty  Start Date End Date  
   
                                                      
  
  
Furness, Jason Jozef, MD  
  
  
NPI: [id number]  
  
  
 [place] [address]  
  
  
[phone] (Work)  
  
  
[phone] (Fax) PCP - General                   14           
  
  
  
                      Team Member Relationship Specialty  Start Date End Date  
   
                                                      
  
  
Jason Bonilla MD  
  
  
NPI: [id number]  
  
  
 [place] [address]  
  
  
[phone] (Work)  
  
  
[phone] (Fax) PCP - General                   14           
  
  
  
                      Team Member Relationship Specialty  Start Date End Date  
   
                                                      
  
  
Jason Bonilla MD  
  
  
NPI: [id number]  
  
  
 [place] [address]  
  
  
[phone] (Work)  
  
  
[phone] (Fax) PCP - General                   14           
  
  
  
                      Team Member Relationship Specialty  Start Date End Date  
   
                                                      
  
  
Jason Bonilla MD  
  
  
NPI: [id number]  
  
  
 [place] [address]  
  
  
[phone] (Work)  
  
  
[phone] (Fax) PCP - General                   14           
  
  
  
                      Team Member Relationship Specialty  Start Date End Date  
   
                                                      
  
  
Jason Bonilla MD  
  
  
NPI: [id number]  
  
  
 [place] [address]  
  
  
[phone] (Work)  
  
  
[phone] (Fax) PCP - General                   14           
  
  
  
                      Team Member Relationship Specialty  Start Date End Date  
   
                                                      
  
  
Jason Bonilla MD  
  
  
NPI: [id number]  
  
  
 [place] [address]  
  
  
[phone] (Work)  
  
  
[phone] (Fax) PCP - General                   14           
  
  
  
                      Team Member Relationship Specialty  Start Date End Date  
   
                                                      
  
  
Jason Bonilla MD  
  
  
NPI: [id number]  
  
  
 [place] [address]  
  
  
[phone] (Work)  
  
  
[phone] (Fax) PCP - General                   19           
   
                                                      
  
  
Jason Bonilla MD  
  
  
NPI: [id number]  
  
  
 [place] [address]  
  
  
[phone] (Work)  
  
  
[phone] (Fax) [facility]                 22            
  
  
  
                      Team Member Relationship Specialty  Start Date End Date  
   
                                                      
  
  
Jason Bonilla MD  
  
  
NPI: [id number]  
  
  
 [place] [address]  
  
  
[phone] (Work)  
  
  
[phone] (Fax) PCP - General                   14           
  
  
  
                      Team Member Relationship Specialty  Start Date End Date  
   
                                                      
  
  
Jason Bonilla MD  
  
  
NPI: [id number]  
  
  
 [place] [address]  
  
  
[phone] (Work)  
  
  
[phone] (Fax) PCP - General                   14           
  
  
  
                      Team Member Relationship Specialty  Start Date End Date  
   
                                                      
  
  
Jason Bonilla MD  
  
  
NPI: [id number]  
  
  
 [place]  
  
  
[place] [address]  
  
  
[phone] (Work)  
  
  
[phone] (Fax) PCP - General                   14           
  
  
  
                      Team Member Relationship Specialty  Start Date End Date  
   
                                                      
  
  
Jason Bonilla MD  
  
  
NPI: [id number]  
  
  
 [address]  
  
  
[place] [address]  
  
  
[phone] (Work)  
  
  
[phone] (Fax) PCP - General                   19           
   
                                                      
  
  
Jason Bonilla MD  
  
  
NPI: [id number]  
  
  
 [address]  
  
  
[place] [address]  
  
  
[phone] (Work)  
  
  
[phone] (Fax) [facility]                 22            
  
  
  
                      Team Member Relationship Specialty  Start Date End Date  
   
                                                      
  
  
Jason Bonilla MD  
  
  
NPI: [id number]  
  
  
 [address]  
  
  
[place] [address]  
  
  
[phone] (Work)  
  
  
[phone] (Fax) PCP - General                   14           
  
  
  
                      Team Member Relationship Specialty  Start Date End Date  
   
                                                      
  
  
Jason Bonilla MD  
  
  
NPI: [id number]  
  
  
 [address]  
  
  
[place] [address]  
  
  
[phone] (Work)  
  
  
[phone] (Fax) PCP - General                   14           
  
  
  
                      Team Member Relationship Specialty  Start Date End Date  
   
                                                      
  
  
Jason Bonilla MD  
  
  
NPI: [id number]  
  
  
 [place]  
  
  
[place] [address]  
  
  
[phone] (Work)  
  
  
[phone] (Fax) PCP - General                   14           
  
  
  
                      Team Member Relationship Specialty  Start Date End Date  
   
                                                      
  
  
Jason Bonilla MD  
  
  
NPI: [id number]  
  
  
 [place] [id number]  
  
  
[phone] (Work)  
  
  
[phone] (Fax) [facility]                   14           
  
  
  
                      Team Member Relationship Specialty  Start Date End Date  
   
                                                      
  
  
Jason Bonilla MD  
  
  
NPI: [id number]  
  
  
 [place] [address]  
  
  
[phone] (Work)  
  
  
[phone] (Fax) [facility]                   14           
  
  
  
                      Team Member Relationship Specialty  Start Date End Date  
   
                                                      
  
  
Jason Bonilla MD  
  
  
NPI: [id number]  
  
  
 [place] [address]  
  
  
[phone] (Work)  
  
  
[phone] (Fax) [facility]                   14           
  
  
  
                      Team Member Relationship Specialty  Start Date End Date  
   
                                                      
  
  
Jason Bonilla MD  
  
  
NPI: [id number]  
  
  
 [place] [address]  
  
  
[phone] (Work)  
  
  
[phone] (Fax) [facility]                   14           
  
  
  
                      Team Member Relationship Specialty  Start Date End Date  
   
                                                      
  
  
Jason Bonilla MD  
  
  
NPI: [id number]  
  
  
[phone] (Work)  
  
  
[phone] (Fax) [facility]                   19           
   
                                                      
  
  
Vanessa Fortune, RN Care Manager    Case Management 24            
  
  
  
                      Team Member Relationship Specialty  Start Date End Date  
   
                                                      
  
  
Jason Bonilla MD  
  
  
NPI: [id number]  
  
  
[address] [place] [address]  
  
  
[phone] (Work)  
  
  
[phone] (Fax) [facility]   Family Medicine 10/8/24           
  
  
FOR RECORDS PERTAINING TO PATIENTS WHO ARE OR HAVE BEEN ENROLLED IN A CHEMICAL 
DEPENDENCY/SUBSTANCEABUSE PROGRAM, SOME INFORMATION MAY BE OMITTED. This 
clinical summary was aggregated from multiple sources. Caution should be 
exercised in using it in the provision of clinical care. This summary normalizes
 information from multiple sources, and as a consequence, information in this 
document may materially change the coding, format and clinical context of 
patient data. In addition, data may be omitted in some cases. CLINICAL DECISIONS
 SHOULD BE BASED ON THE PRIMARY CLINICAL RECORDS. [facility] [employer] [facility]. provides
 no warranty or guarantee of the accuracy or completeness of information in this
 document.

## 2024-12-19 DIAGNOSIS — E11.9 CONTROLLED TYPE 2 DIABETES MELLITUS WITHOUT COMPLICATION, WITHOUT LONG-TERM CURRENT USE OF INSULIN (MULTI): ICD-10-CM

## 2024-12-19 RX ORDER — METFORMIN HYDROCHLORIDE 500 MG/1
500 TABLET ORAL
Qty: 180 TABLET | Refills: 3 | Status: SHIPPED | OUTPATIENT
Start: 2024-12-19 | End: 2025-12-19

## 2025-01-02 ENCOUNTER — APPOINTMENT (OUTPATIENT)
Age: 70
End: 2025-01-02
Payer: COMMERCIAL

## 2025-01-28 ENCOUNTER — HOSPITAL ENCOUNTER (OUTPATIENT)
Dept: HOSPITAL 100 - LAB | Age: 70
Discharge: HOME | End: 2025-01-28
Payer: COMMERCIAL

## 2025-01-28 DIAGNOSIS — E11.9: ICD-10-CM

## 2025-01-28 DIAGNOSIS — I10: Primary | ICD-10-CM

## 2025-01-28 LAB
ANION GAP: 5 (ref 5–15)
BUN SERPL-MCNC: 20 MG/DL (ref 7–18)
BUN/CREAT RATIO: 14.7 RATIO (ref 10–20)
CALCIUM SERPL-MCNC: 9.9 MG/DL (ref 8.5–10.1)
CARBON DIOXIDE: 30 MMOL/L (ref 21–32)
CHLORIDE: 104 MMOL/L (ref 98–107)
EST GLOM FILT RATE - AFR AMER: 67 ML/MIN (ref 60–?)
GLUCOSE: 131 MG/DL (ref 74–106)
POTASSIUM: 3.8 MMOL/L (ref 3.5–5.1)

## 2025-01-28 PROCEDURE — 80048 BASIC METABOLIC PNL TOTAL CA: CPT

## 2025-01-28 PROCEDURE — 36415 COLL VENOUS BLD VENIPUNCTURE: CPT

## 2025-04-08 ENCOUNTER — HOSPITAL ENCOUNTER (OUTPATIENT)
Dept: HOSPITAL 100 - LAB | Age: 70
Discharge: HOME | End: 2025-04-08
Payer: COMMERCIAL

## 2025-04-08 DIAGNOSIS — E78.00: Primary | ICD-10-CM

## 2025-04-08 LAB
ALBUMIN SERPL-MCNC: 4.2 G/DL (ref 3.4–4.8)
ALP SERPL-CCNC: 78 U/L (ref 40–129)
ALT SERPL-CCNC: 29 U/L (ref ?–46)
AST(SGOT): 28 U/L (ref ?–37)
BILIRUB DIRECT SERPL-MCNC: 0.63 MG/DL (ref 0–0.3)
CHOLEST SERPL-MCNC: 118 MG/DL (ref ?–200)
CHOLESTEROL:HDL RATIO SCREEN: 2.58
GLOBULIN: 2.9 G/DL (ref 2.2–4.2)
LOW DENSITY LIPOPROTEIN CALC.: 46 MG/DL
TRIGLYCERIDES: 133 MG/DL
VLDLC SERPL-MCNC: 27 MG/DL (ref 5–40)

## 2025-04-08 PROCEDURE — 80061 LIPID PANEL: CPT

## 2025-04-08 PROCEDURE — 36415 COLL VENOUS BLD VENIPUNCTURE: CPT

## 2025-04-08 PROCEDURE — 80076 HEPATIC FUNCTION PANEL: CPT

## 2025-06-05 ENCOUNTER — APPOINTMENT (OUTPATIENT)
Age: 70
End: 2025-06-05
Payer: COMMERCIAL

## 2025-06-05 VITALS
SYSTOLIC BLOOD PRESSURE: 112 MMHG | HEART RATE: 80 BPM | WEIGHT: 240.8 LBS | OXYGEN SATURATION: 95 % | HEIGHT: 70 IN | DIASTOLIC BLOOD PRESSURE: 66 MMHG | BODY MASS INDEX: 34.47 KG/M2

## 2025-06-05 DIAGNOSIS — E66.812 CLASS 2 SEVERE OBESITY DUE TO EXCESS CALORIES WITH SERIOUS COMORBIDITY AND BODY MASS INDEX (BMI) OF 35.0 TO 35.9 IN ADULT: ICD-10-CM

## 2025-06-05 DIAGNOSIS — I48.0 PAROXYSMAL ATRIAL FIBRILLATION (MULTI): ICD-10-CM

## 2025-06-05 DIAGNOSIS — E11.9 CONTROLLED TYPE 2 DIABETES MELLITUS WITHOUT COMPLICATION, WITHOUT LONG-TERM CURRENT USE OF INSULIN: Primary | ICD-10-CM

## 2025-06-05 DIAGNOSIS — I10 HYPERTENSION, ESSENTIAL: ICD-10-CM

## 2025-06-05 DIAGNOSIS — M25.512 ACUTE PAIN OF LEFT SHOULDER: ICD-10-CM

## 2025-06-05 DIAGNOSIS — R21 RASH: ICD-10-CM

## 2025-06-05 DIAGNOSIS — E66.01 CLASS 2 SEVERE OBESITY DUE TO EXCESS CALORIES WITH SERIOUS COMORBIDITY AND BODY MASS INDEX (BMI) OF 35.0 TO 35.9 IN ADULT: ICD-10-CM

## 2025-06-05 PROCEDURE — 99214 OFFICE O/P EST MOD 30 MIN: CPT | Performed by: FAMILY MEDICINE

## 2025-06-05 PROCEDURE — 1157F ADVNC CARE PLAN IN RCRD: CPT | Performed by: FAMILY MEDICINE

## 2025-06-05 PROCEDURE — 3078F DIAST BP <80 MM HG: CPT | Performed by: FAMILY MEDICINE

## 2025-06-05 PROCEDURE — 3074F SYST BP LT 130 MM HG: CPT | Performed by: FAMILY MEDICINE

## 2025-06-05 PROCEDURE — 3008F BODY MASS INDEX DOCD: CPT | Performed by: FAMILY MEDICINE

## 2025-06-05 PROCEDURE — 1160F RVW MEDS BY RX/DR IN RCRD: CPT | Performed by: FAMILY MEDICINE

## 2025-06-05 PROCEDURE — 1036F TOBACCO NON-USER: CPT | Performed by: FAMILY MEDICINE

## 2025-06-05 PROCEDURE — 1159F MED LIST DOCD IN RCRD: CPT | Performed by: FAMILY MEDICINE

## 2025-06-05 RX ORDER — PREDNISONE 10 MG/1
TABLET ORAL
Qty: 30 TABLET | Refills: 1 | Status: SHIPPED | OUTPATIENT
Start: 2025-06-05 | End: 2025-06-17

## 2025-06-05 RX ORDER — TRIAMCINOLONE ACETONIDE 1 MG/G
CREAM TOPICAL 2 TIMES DAILY
Qty: 80 G | Refills: 1 | Status: SHIPPED | OUTPATIENT
Start: 2025-06-05

## 2025-06-05 ASSESSMENT — ENCOUNTER SYMPTOMS
CONSTIPATION: 0
DIARRHEA: 0
PALPITATIONS: 0
FATIGUE: 0
HEADACHES: 0
COUGH: 0
ABDOMINAL PAIN: 0
SHORTNESS OF BREATH: 0

## 2025-06-05 ASSESSMENT — PATIENT HEALTH QUESTIONNAIRE - PHQ9
1. LITTLE INTEREST OR PLEASURE IN DOING THINGS: NOT AT ALL
2. FEELING DOWN, DEPRESSED OR HOPELESS: NOT AT ALL
SUM OF ALL RESPONSES TO PHQ9 QUESTIONS 1 & 2: 0

## 2025-06-05 NOTE — PROGRESS NOTES
"Subjective   Patient ID: Sunday Barriga is a 70 y.o. male who presents for Follow-up (DM, GERD, HTN, HL, AFIB, CAD, anemia; S/P cardiologist visit; evaluate rash bilateral arms).    HPI   Blood pressure is a little bit on the low side but no symptoms continue same medicines for now.  A-fib stable on medications.  Will have him get an A1c and other labs, but his weight is down 20 pounds with the Jardiance.  Obviously has changed his diet in a good way.  Continue the same medications he is having a little bit of thirstiness with the medicine so maybe not increase the dose but we will see where the A1c is first.  Office visit set for 6 months.  Will need lab orders.    Review of Systems   Constitutional:  Negative for fatigue.   Respiratory:  Negative for cough and shortness of breath.    Cardiovascular:  Negative for chest pain and palpitations.   Gastrointestinal:  Negative for abdominal pain, constipation and diarrhea.   Skin:  Negative for rash.   Neurological:  Negative for headaches.       Objective   /66   Pulse 80 Comment: IRREGULAR  Ht 1.765 m (5' 9.5\")   Wt 109 kg (240 lb 12.8 oz)   SpO2 95%   BMI 35.05 kg/m²     Physical Exam  Constitutional:       Appearance: Normal appearance.   HENT:      Head: Normocephalic and atraumatic.   Cardiovascular:      Rate and Rhythm: Normal rate and regular rhythm.      Heart sounds: Normal heart sounds.   Pulmonary:      Effort: Pulmonary effort is normal.      Breath sounds: Normal breath sounds.   Skin:     General: Skin is warm and dry.   Neurological:      General: No focal deficit present.      Mental Status: He is alert and oriented to person, place, and time.   Psychiatric:         Mood and Affect: Mood normal.         Behavior: Behavior normal.         Thought Content: Thought content normal.         Judgment: Judgment normal.         Assessment/Plan   Problem List Items Addressed This Visit           ICD-10-CM    Controlled type 2 diabetes mellitus without " complication, without long-term current use of insulin - Primary E11.9    Relevant Orders    Comprehensive Metabolic Panel    CBC    Hemoglobin A1C    Thyroid Stimulating Hormone    Hypertension, essential I10    Relevant Orders    Comprehensive Metabolic Panel    CBC    Thyroid Stimulating Hormone    Paroxysmal atrial fibrillation (Multi) I48.0    Class 2 severe obesity due to excess calories with serious comorbidity and body mass index (BMI) of 35.0 to 35.9 in adult E66.812, E66.01, Z68.35     Other Visit Diagnoses         Codes      Rash     R21    Relevant Medications    triamcinolone (Kenalog) 0.1 % cream      Acute pain of left shoulder     M25.512    Relevant Medications    predniSONE (Deltasone) 10 mg tablet

## 2025-06-06 DIAGNOSIS — E11.9 CONTROLLED TYPE 2 DIABETES MELLITUS WITHOUT COMPLICATION, WITHOUT LONG-TERM CURRENT USE OF INSULIN: Primary | ICD-10-CM

## 2025-06-06 LAB
ALBUMIN SERPL-MCNC: 4.4 G/DL (ref 3.6–5.1)
ALP SERPL-CCNC: 68 U/L (ref 35–144)
ALT SERPL-CCNC: 23 U/L (ref 9–46)
ANION GAP SERPL CALCULATED.4IONS-SCNC: 10 MMOL/L (CALC) (ref 7–17)
AST SERPL-CCNC: 21 U/L (ref 10–35)
BILIRUB SERPL-MCNC: 1.2 MG/DL (ref 0.2–1.2)
BUN SERPL-MCNC: 15 MG/DL (ref 7–25)
CALCIUM SERPL-MCNC: 9.7 MG/DL (ref 8.6–10.3)
CHLORIDE SERPL-SCNC: 102 MMOL/L (ref 98–110)
CO2 SERPL-SCNC: 28 MMOL/L (ref 20–32)
CREAT SERPL-MCNC: 1.25 MG/DL (ref 0.7–1.28)
EGFRCR SERPLBLD CKD-EPI 2021: 62 ML/MIN/1.73M2
ERYTHROCYTE [DISTWIDTH] IN BLOOD BY AUTOMATED COUNT: 13.8 % (ref 11–15)
EST. AVERAGE GLUCOSE BLD GHB EST-MCNC: 140 MG/DL
EST. AVERAGE GLUCOSE BLD GHB EST-SCNC: 7.7 MMOL/L
GLUCOSE SERPL-MCNC: 187 MG/DL (ref 65–99)
HBA1C MFR BLD: 6.5 %
HCT VFR BLD AUTO: 46.1 % (ref 38.5–50)
HGB BLD-MCNC: 15.6 G/DL (ref 13.2–17.1)
MCH RBC QN AUTO: 34.9 PG (ref 27–33)
MCHC RBC AUTO-ENTMCNC: 33.8 G/DL (ref 32–36)
MCV RBC AUTO: 103.1 FL (ref 80–100)
PLATELET # BLD AUTO: 176 THOUSAND/UL (ref 140–400)
PMV BLD REES-ECKER: 9.5 FL (ref 7.5–12.5)
POTASSIUM SERPL-SCNC: 3.6 MMOL/L (ref 3.5–5.3)
PROT SERPL-MCNC: 6.8 G/DL (ref 6.1–8.1)
RBC # BLD AUTO: 4.47 MILLION/UL (ref 4.2–5.8)
SODIUM SERPL-SCNC: 140 MMOL/L (ref 135–146)
TSH SERPL-ACNC: 2.59 MIU/L (ref 0.4–4.5)
WBC # BLD AUTO: 6.6 THOUSAND/UL (ref 3.8–10.8)

## 2025-09-05 DIAGNOSIS — I10 HYPERTENSION, ESSENTIAL: ICD-10-CM

## 2025-09-05 RX ORDER — METOPROLOL TARTRATE 50 MG/1
50 TABLET ORAL EVERY 12 HOURS
Qty: 180 TABLET | Refills: 3 | Status: SHIPPED | OUTPATIENT
Start: 2025-09-05 | End: 2026-09-05

## 2025-12-04 ENCOUNTER — APPOINTMENT (OUTPATIENT)
Age: 70
End: 2025-12-04
Payer: COMMERCIAL